# Patient Record
Sex: MALE | Race: ASIAN | NOT HISPANIC OR LATINO | Employment: UNEMPLOYED | URBAN - METROPOLITAN AREA
[De-identification: names, ages, dates, MRNs, and addresses within clinical notes are randomized per-mention and may not be internally consistent; named-entity substitution may affect disease eponyms.]

---

## 2017-10-10 ENCOUNTER — OFFICE VISIT (OUTPATIENT)
Dept: RETAIL CLINIC | Facility: CLINIC | Age: 27
End: 2017-10-10

## 2017-10-10 DIAGNOSIS — Z02.83 ENCOUNTER FOR DRUG SCREENING: Primary | ICD-10-CM

## 2017-10-10 NOTE — PROGRESS NOTES
Reason for Appointment  1. escreen    History of Present Illness  HPI:   See scanned document. See custody control form.    Assessments  1. Encounter for screening, unspecified - Z13.9    This document has been electronically signed by GINA Albert October 10, 2017 12:21 PM

## 2019-04-20 ENCOUNTER — OFFICE VISIT (OUTPATIENT)
Dept: FAMILY MEDICINE CLINIC | Facility: CLINIC | Age: 29
End: 2019-04-20

## 2019-04-20 VITALS
DIASTOLIC BLOOD PRESSURE: 86 MMHG | BODY MASS INDEX: 29.18 KG/M2 | RESPIRATION RATE: 18 BRPM | OXYGEN SATURATION: 96 % | WEIGHT: 197 LBS | HEART RATE: 102 BPM | SYSTOLIC BLOOD PRESSURE: 124 MMHG | HEIGHT: 69 IN

## 2019-04-20 DIAGNOSIS — F90.9 ATTENTION DEFICIT HYPERACTIVITY DISORDER (ADHD), UNSPECIFIED ADHD TYPE: Primary | ICD-10-CM

## 2019-04-20 PROCEDURE — 99203 OFFICE O/P NEW LOW 30 MIN: CPT | Performed by: FAMILY MEDICINE

## 2019-04-20 NOTE — PROGRESS NOTES
Subjective   Mayo Landers is a 28 y.o. male.     Chief Complaint   Patient presents with   • ADD       History of Present Illness     Previous primary care: None    Chronic health conditions:  None    Other physicians currently involved in patient's care:  None    Acute complaints:  Mayo Landers is a 28 y.o. male who presents today to establish care. 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 He states he has always had issues with concentration, but has never been diagnosed or medicated. He is trying to take his boards in a couple weeks.    The following portions of the patient's history were reviewed and updated as appropriate: allergies, current medications, past family history, past medical history, past social history, past surgical history and problem list.    Active Ambulatory Problems     Diagnosis Date Noted   • No Active Ambulatory Problems     Resolved Ambulatory Problems     Diagnosis Date Noted   • No Resolved Ambulatory Problems     Past Medical History:   Diagnosis Date   • Anxiety    • Chronic bronchitis (CMS/HCC)    • Depression    • Fatty liver    • Fracture      Past Surgical History:   Procedure Laterality Date   • EAR TUBES       Family History   Problem Relation Age of Onset   • Breast cancer Mother    • Hyperlipidemia Father    • Hypertension Father    • No Known Problems Sister    • Obesity Brother    • Klinefelter's syndrome Brother    • Breast cancer Maternal Grandmother      Social History     Socioeconomic History   • Marital status: Single     Spouse name: Not on file   • Number of children: Not on file   • Years of education: Not on file   • Highest education level: Not on file   Tobacco Use   • Smoking status: Current Every Day Smoker     Types: Electronic Cigarette   • Smokeless tobacco: Never Used   Substance and Sexual Activity   • Alcohol use: Yes     Comment:  "Ocassionally   • Drug use: No         Review of Systems   Constitutional: Negative.    Respiratory: Negative.    Cardiovascular: Negative.    Gastrointestinal: Negative.    Psychiatric/Behavioral: Positive for decreased concentration.       Objective   Blood pressure 124/86, pulse 102, resp. rate 18, height 175.3 cm (69\"), weight 89.4 kg (197 lb), SpO2 96 %.  Nursing note reviewed  Physical Exam  Const: NAD, A&Ox4, Pleasant, Cooperative  Eyes: EOMI, no conjunctivitis  ENT: No nasal discharge present, neck supple  Cardiac: Regular rate and rhythm, no cyanosis  Resp: Respiratory rate within normal limits, no increased work of breathing, no audible wheezing or retractions noted  GI: No distention or ascites  MSK: Motor and sensation grossly intact in bilateral upper extremities  Neurologic: CN II-XII grossly intact  Psych: Very fidgety, pressured speech  Skin: Pink, warm, dry  Procedures  Assessment/Plan   Mayo was seen today for add.    Diagnoses and all orders for this visit:    Attention deficit hyperactivity disorder (ADHD), unspecified ADHD type      Acute concerns:  #1 ADHD  Referral to neuropsych testing made  Follow up after this for medication    There are no Patient Instructions on file for this visit.    No Follow-up on file.    Ambulatory progress note signed and attested to by Francisco Javier Miller D.O. on 4/20/2019 at 11:33.           "

## 2019-04-22 ENCOUNTER — TELEPHONE (OUTPATIENT)
Dept: FAMILY MEDICINE CLINIC | Facility: CLINIC | Age: 29
End: 2019-04-22

## 2019-04-25 ENCOUNTER — OFFICE VISIT (OUTPATIENT)
Dept: FAMILY MEDICINE CLINIC | Facility: CLINIC | Age: 29
End: 2019-04-25

## 2019-04-25 VITALS
WEIGHT: 197.8 LBS | OXYGEN SATURATION: 97 % | BODY MASS INDEX: 29.3 KG/M2 | DIASTOLIC BLOOD PRESSURE: 72 MMHG | HEIGHT: 69 IN | HEART RATE: 70 BPM | SYSTOLIC BLOOD PRESSURE: 112 MMHG

## 2019-04-25 DIAGNOSIS — F90.1 ATTENTION DEFICIT HYPERACTIVITY DISORDER (ADHD), PREDOMINANTLY HYPERACTIVE TYPE: Primary | ICD-10-CM

## 2019-04-25 PROCEDURE — 99213 OFFICE O/P EST LOW 20 MIN: CPT | Performed by: FAMILY MEDICINE

## 2019-04-25 RX ORDER — DEXTROAMPHETAMINE SACCHARATE, AMPHETAMINE ASPARTATE, DEXTROAMPHETAMINE SULFATE AND AMPHETAMINE SULFATE 2.5; 2.5; 2.5; 2.5 MG/1; MG/1; MG/1; MG/1
10 TABLET ORAL DAILY
Qty: 60 TABLET | Refills: 0 | Status: SHIPPED | OUTPATIENT
Start: 2019-04-25 | End: 2019-05-28 | Stop reason: SDUPTHER

## 2019-04-26 ENCOUNTER — TELEPHONE (OUTPATIENT)
Dept: FAMILY MEDICINE CLINIC | Facility: CLINIC | Age: 29
End: 2019-04-26

## 2019-04-26 NOTE — TELEPHONE ENCOUNTER
PATIENT'S INSURANCE WILL NOT PAY FOR THE 60 TABS IN ONE RX. PATIENT WILL NEED THE OTHER 30 DAYS CALLED IN EITHER NOW OR IN MAY AROUND THE 25TH.

## 2019-05-01 NOTE — PROGRESS NOTES
Subjective   Mayo Landers is a 28 y.o. male.     Chief Complaint   Patient presents with   • ADD     Follow up, place that referral was made wouldn't accept his insurance, needs to know what to do now.       History of Present Illness     Mayo Landers presents today for follow-up.  He has a history of adult ADHD that has gone untreated.  We had attempted to get him into neuropsych testing, however he will be unable to get in for this for a few months.  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    The following portions of the patient's history were reviewed and updated as appropriate: allergies, current medications, past family history, past medical history, past social history, past surgical history and problem list.    Active Ambulatory Problems     Diagnosis Date Noted   • Attention deficit hyperactivity disorder (ADHD), predominantly hyperactive type 04/25/2019     Resolved Ambulatory Problems     Diagnosis Date Noted   • No Resolved Ambulatory Problems     Past Medical History:   Diagnosis Date   • Anxiety    • Chronic bronchitis (CMS/HCC)    • Depression    • Fatty liver    • Fracture      Past Surgical History:   Procedure Laterality Date   • EAR TUBES       Family History   Problem Relation Age of Onset   • Breast cancer Mother    • Hyperlipidemia Father    • Hypertension Father    • No Known Problems Sister    • Obesity Brother    • Klinefelter's syndrome Brother    • Breast cancer Maternal Grandmother      Social History     Socioeconomic History   • Marital status: Single     Spouse name: Not on file   • Number of children: Not on file   • Years of education: Not on file   • Highest education level: Not on file   Tobacco Use   • Smoking status: Current Every Day Smoker     Types: Electronic Cigarette   • Smokeless tobacco: Never Used   Substance and Sexual Activity   • Alcohol use: Yes     Comment: Ocassionally   • Drug use: No         Review of Systems   Constitutional: Negative.   "  Psychiatric/Behavioral: Positive for decreased concentration.       Objective   Blood pressure 112/72, pulse 70, height 175.3 cm (69\"), weight 89.7 kg (197 lb 12.8 oz), SpO2 97 %.  Nursing note reviewed  Physical Exam  Const: NAD, A&Ox4, restless and fidgety  Eyes: EOMI, no conjunctivitis  ENT: No nasal discharge present, neck supple  Cardiac: Regular rate and rhythm, no cyanosis  Resp: Respiratory rate within normal limits, no increased work of breathing, no audible wheezing or retractions noted  GI: No distention or ascites  MSK: Motor and sensation grossly intact in bilateral upper extremities  Neurologic: CN II-XII grossly intact  Psych: Appropriate mood and behavior.  Speech is rapid  Skin: Pink, warm, dry  Procedures  Assessment/Plan   Mayo was seen today for add.    Diagnoses and all orders for this visit:    Attention deficit hyperactivity disorder (ADHD), predominantly hyperactive type  -     amphetamine-dextroamphetamine (ADDERALL) 10 MG tablet; Take 1 tablet by mouth Daily.        #1  ADHD  Symptom score completed today by patient, basically threes across the board  UDS today  Short-term prescription of Adderall provided today while we await neuropsych testing  Myke reviewed and appropriate    There are no Patient Instructions on file for this visit.    Return in about 1 month (around 5/25/2019).    Ambulatory progress note signed and attested to by Francisco Javier Miller D.O.             "

## 2019-05-28 DIAGNOSIS — F90.1 ATTENTION DEFICIT HYPERACTIVITY DISORDER (ADHD), PREDOMINANTLY HYPERACTIVE TYPE: ICD-10-CM

## 2019-05-28 RX ORDER — DEXTROAMPHETAMINE SACCHARATE, AMPHETAMINE ASPARTATE, DEXTROAMPHETAMINE SULFATE AND AMPHETAMINE SULFATE 2.5; 2.5; 2.5; 2.5 MG/1; MG/1; MG/1; MG/1
10 TABLET ORAL DAILY
Qty: 30 TABLET | Refills: 0 | Status: SHIPPED | OUTPATIENT
Start: 2019-05-28 | End: 2019-07-02 | Stop reason: SDUPTHER

## 2019-06-10 ENCOUNTER — OFFICE VISIT (OUTPATIENT)
Dept: FAMILY MEDICINE CLINIC | Facility: CLINIC | Age: 29
End: 2019-06-10

## 2019-06-10 ENCOUNTER — LAB (OUTPATIENT)
Dept: LAB | Facility: HOSPITAL | Age: 29
End: 2019-06-10

## 2019-06-10 VITALS
HEIGHT: 69 IN | WEIGHT: 190 LBS | DIASTOLIC BLOOD PRESSURE: 60 MMHG | BODY MASS INDEX: 28.14 KG/M2 | HEART RATE: 80 BPM | TEMPERATURE: 98.1 F | SYSTOLIC BLOOD PRESSURE: 110 MMHG

## 2019-06-10 DIAGNOSIS — Z13.0 SCREENING FOR DEFICIENCY ANEMIA: ICD-10-CM

## 2019-06-10 DIAGNOSIS — F90.1 ATTENTION DEFICIT HYPERACTIVITY DISORDER (ADHD), PREDOMINANTLY HYPERACTIVE TYPE: Primary | ICD-10-CM

## 2019-06-10 DIAGNOSIS — Z13.29 SCREENING FOR THYROID DISORDER: ICD-10-CM

## 2019-06-10 DIAGNOSIS — Z13.220 SCREENING FOR LIPID DISORDERS: ICD-10-CM

## 2019-06-10 DIAGNOSIS — Z13.1 SCREENING FOR DIABETES MELLITUS: ICD-10-CM

## 2019-06-10 DIAGNOSIS — Z11.3 ROUTINE SCREENING FOR STI (SEXUALLY TRANSMITTED INFECTION): ICD-10-CM

## 2019-06-10 LAB
ALBUMIN SERPL-MCNC: 4.7 G/DL (ref 3.5–5.2)
ALBUMIN/GLOB SERPL: 1.3 G/DL
ALP SERPL-CCNC: 62 U/L (ref 39–117)
ALT SERPL W P-5'-P-CCNC: 37 U/L (ref 1–41)
ANION GAP SERPL CALCULATED.3IONS-SCNC: 14.8 MMOL/L
AST SERPL-CCNC: 44 U/L (ref 1–40)
BASOPHILS # BLD AUTO: 0.02 10*3/MM3 (ref 0–0.2)
BASOPHILS NFR BLD AUTO: 0.3 % (ref 0–1.5)
BILIRUB SERPL-MCNC: 0.6 MG/DL (ref 0.2–1.2)
BUN BLD-MCNC: 8 MG/DL (ref 6–20)
BUN/CREAT SERPL: 8.1 (ref 7–25)
CALCIUM SPEC-SCNC: 9.8 MG/DL (ref 8.6–10.5)
CHLORIDE SERPL-SCNC: 97 MMOL/L (ref 98–107)
CHOLEST SERPL-MCNC: 176 MG/DL (ref 0–200)
CO2 SERPL-SCNC: 26.2 MMOL/L (ref 22–29)
CREAT BLD-MCNC: 0.99 MG/DL (ref 0.76–1.27)
DEPRECATED RDW RBC AUTO: 40.2 FL (ref 37–54)
EOSINOPHIL # BLD AUTO: 0.09 10*3/MM3 (ref 0–0.4)
EOSINOPHIL NFR BLD AUTO: 1.5 % (ref 0.3–6.2)
ERYTHROCYTE [DISTWIDTH] IN BLOOD BY AUTOMATED COUNT: 13 % (ref 12.3–15.4)
GFR SERPL CREATININE-BSD FRML MDRD: 110 ML/MIN/1.73
GFR SERPL CREATININE-BSD FRML MDRD: 91 ML/MIN/1.73
GLOBULIN UR ELPH-MCNC: 3.5 GM/DL
GLUCOSE BLD-MCNC: 90 MG/DL (ref 65–99)
HCT VFR BLD AUTO: 47.6 % (ref 37.5–51)
HDLC SERPL-MCNC: 43 MG/DL (ref 40–60)
HGB BLD-MCNC: 15.8 G/DL (ref 13–17.7)
HIV1+2 AB SER QL: NORMAL
IMM GRANULOCYTES # BLD AUTO: 0.01 10*3/MM3 (ref 0–0.05)
IMM GRANULOCYTES NFR BLD AUTO: 0.2 % (ref 0–0.5)
LDLC SERPL CALC-MCNC: 115 MG/DL (ref 0–100)
LDLC/HDLC SERPL: 2.67 {RATIO}
LYMPHOCYTES # BLD AUTO: 2.27 10*3/MM3 (ref 0.7–3.1)
LYMPHOCYTES NFR BLD AUTO: 37.9 % (ref 19.6–45.3)
MCH RBC QN AUTO: 28.9 PG (ref 26.6–33)
MCHC RBC AUTO-ENTMCNC: 33.2 G/DL (ref 31.5–35.7)
MCV RBC AUTO: 87.2 FL (ref 79–97)
MONOCYTES # BLD AUTO: 0.49 10*3/MM3 (ref 0.1–0.9)
MONOCYTES NFR BLD AUTO: 8.2 % (ref 5–12)
NEUTROPHILS # BLD AUTO: 3.11 10*3/MM3 (ref 1.7–7)
NEUTROPHILS NFR BLD AUTO: 51.9 % (ref 42.7–76)
NRBC BLD AUTO-RTO: 0 /100 WBC (ref 0–0.2)
PLATELET # BLD AUTO: 373 10*3/MM3 (ref 140–450)
PMV BLD AUTO: 8.9 FL (ref 6–12)
POTASSIUM BLD-SCNC: 3.8 MMOL/L (ref 3.5–5.2)
PROT SERPL-MCNC: 8.2 G/DL (ref 6–8.5)
RBC # BLD AUTO: 5.46 10*6/MM3 (ref 4.14–5.8)
RPR SER QL: NORMAL
SODIUM BLD-SCNC: 138 MMOL/L (ref 136–145)
TRIGL SERPL-MCNC: 91 MG/DL (ref 0–150)
TSH SERPL DL<=0.05 MIU/L-ACNC: 1.31 MIU/ML (ref 0.27–4.2)
VLDLC SERPL-MCNC: 18.2 MG/DL (ref 5–40)
WBC NRBC COR # BLD: 5.99 10*3/MM3 (ref 3.4–10.8)

## 2019-06-10 PROCEDURE — 85025 COMPLETE CBC W/AUTO DIFF WBC: CPT | Performed by: NURSE PRACTITIONER

## 2019-06-10 PROCEDURE — G0432 EIA HIV-1/HIV-2 SCREEN: HCPCS | Performed by: NURSE PRACTITIONER

## 2019-06-10 PROCEDURE — 87591 N.GONORRHOEAE DNA AMP PROB: CPT | Performed by: NURSE PRACTITIONER

## 2019-06-10 PROCEDURE — 87491 CHLMYD TRACH DNA AMP PROBE: CPT | Performed by: NURSE PRACTITIONER

## 2019-06-10 PROCEDURE — 84443 ASSAY THYROID STIM HORMONE: CPT | Performed by: NURSE PRACTITIONER

## 2019-06-10 PROCEDURE — 80061 LIPID PANEL: CPT | Performed by: NURSE PRACTITIONER

## 2019-06-10 PROCEDURE — 80053 COMPREHEN METABOLIC PANEL: CPT | Performed by: NURSE PRACTITIONER

## 2019-06-10 PROCEDURE — 87661 TRICHOMONAS VAGINALIS AMPLIF: CPT | Performed by: NURSE PRACTITIONER

## 2019-06-10 PROCEDURE — 86592 SYPHILIS TEST NON-TREP QUAL: CPT | Performed by: NURSE PRACTITIONER

## 2019-06-10 PROCEDURE — 99213 OFFICE O/P EST LOW 20 MIN: CPT | Performed by: NURSE PRACTITIONER

## 2019-06-10 NOTE — PROGRESS NOTES
"Chief Complaint   Patient presents with   • Follow-up     He comes in today to be seen today to follow up onnew medication and std check        HPI   Mayo resents today for follow-up of Adderall for ADHD, he would also like lab work and STI testing.    ADHD  Chronic.  Improving.  States that he feels like he has been struggling with ADHD for quite some time, has never been on medication for this issue.  Is studying for [employer] at this time, recently graduated, feels that he needed something to help him focus.  He has been on Adderall for 2 months, feels that it is working very well for him.  He still gets distracted but it is easier for him to get back on track.  He does report that he has lost a little bit of weight \"which he needed to\".  He states that it makes him feel \"pepped up\" and focused.  Has had no side effects of this medication.  Would like to continue on medication for now and eventually only take it as needed.    Routine testing  Requesting routine lab work and STI testing.  He would like to be tested for HIV and syphilis as well, he is not sure if it is covered on insurance so he is going to call first.        Review of Systems   Constitutional: Positive for fatigue (student, bad sleep routine). Negative for activity change, unexpected weight gain and unexpected weight loss.   HENT: Negative for congestion.    Eyes: Negative for blurred vision and visual disturbance.   Respiratory: Negative for chest tightness, shortness of breath and wheezing.    Cardiovascular: Negative for chest pain, palpitations and leg swelling.   Gastrointestinal: Positive for abdominal pain (RUQ pain when he drinks hard liquor). Negative for constipation, diarrhea, nausea, vomiting and GERD.   Musculoskeletal: Negative for arthralgias.   Neurological: Negative for dizziness, light-headedness, headache and confusion.   Psychiatric/Behavioral: Positive for stress. Negative for depressed mood. The patient is nervous/anxious.     " "    Current Outpatient Medications on File Prior to Visit   Medication Sig Dispense Refill   • amphetamine-dextroamphetamine (ADDERALL) 10 MG tablet Take 1 tablet by mouth Daily. 30 tablet 0     No current facility-administered medications on file prior to visit.        No Known Allergies    Past Medical History:   Diagnosis Date   • Anxiety    • Chronic bronchitis (CMS/HCC)    • Depression    • Fatty liver    • Fracture        Social History     Tobacco Use   Smoking Status Current Every Day Smoker   • Types: Electronic Cigarette   Smokeless Tobacco Never Used        Visit Vitals  /60   Pulse 80   Temp 98.1 °F (36.7 °C)   Ht 175.3 cm (69\")   Wt 86.2 kg (190 lb)   BMI 28.06 kg/m²        Physical Exam   Constitutional: He is oriented to person, place, and time. Vital signs are normal. He appears well-developed and well-nourished.   HENT:   Head: Normocephalic.   Eyes: Lids are normal.   Neck: No thyromegaly present.   Cardiovascular: Normal rate, regular rhythm, normal heart sounds and intact distal pulses.   Pulmonary/Chest: Effort normal and breath sounds normal.   Abdominal: Soft. Bowel sounds are normal. There is no hepatosplenomegaly. There is no tenderness. There is no CVA tenderness, no tenderness at McBurney's point and negative Condon's sign.   Musculoskeletal: Normal range of motion.   Lymphadenopathy:        Head (right side): No submental, no submandibular, no tonsillar, no preauricular, no posterior auricular and no occipital adenopathy present.        Head (left side): No submental, no submandibular, no tonsillar, no preauricular, no posterior auricular and no occipital adenopathy present.     He has no cervical adenopathy.   Neurological: He is alert and oriented to person, place, and time. GCS eye subscore is 4. GCS verbal subscore is 5. GCS motor subscore is 6.   Skin: Skin is warm, dry and intact.   Psychiatric: He has a normal mood and affect. His speech is normal and behavior is normal. " Judgment and thought content normal.   Nursing note and vitals reviewed.      No results found for this or any previous visit.     Assessment/Plan  Mayo was seen today for follow-up.    Diagnoses and all orders for this visit:    ADHD   -Continue medication as directed.  Per the orders it appears that a refill will be due on 6/28.  I will notify Dr. Miller of the refill status and he is to follow-up with Dr. Miller for his next appointment.  -CSA, MAYURI, and UDS on file.    Routine screening for STI (sexually transmitted infection)  -     Chlamydia trachomatis, Neisseria gonorrhoeae, Trichomonas vaginalis, PCR - Urine, Urine, Clean Catch; Future  -     HIV-1 / O / 2 Ag / Antibody 4th Generation; Future  -     RPR; Future  -Educated on high risk sexual behavior and the importance of STI prevention with use of condoms.  Advised not to mix sex with alcohol.  Patient verbalized understanding.    Screening for deficiency anemia  -     CBC & Differential; Future  Screening for diabetes mellitus  -     Comprehensive Metabolic Panel; Future  Screening for lipid disorders  -     Lipid Panel; Future  Screening for thyroid disorder  -     TSH Rfx On Abnormal To Free T4; Future        Return in about 1 month (around 7/10/2019) for Follow-up.

## 2019-06-12 LAB
C TRACH RRNA SPEC DONR QL NAA+PROBE: NEGATIVE
GNRH SERPL-MCNC: NEGATIVE
T VAGINALIS RRNA GENITAL QL PROBE: NEGATIVE

## 2019-07-02 DIAGNOSIS — F90.1 ATTENTION DEFICIT HYPERACTIVITY DISORDER (ADHD), PREDOMINANTLY HYPERACTIVE TYPE: ICD-10-CM

## 2019-07-02 NOTE — TELEPHONE ENCOUNTER
Last fill: 5/28/19  Last office visit: 6/10/19  Next office visit: 7/11/19  Last Myke:   Controlled substance contract on file? 4/25/19  Last UDS:  4/25/19

## 2019-07-03 RX ORDER — DEXTROAMPHETAMINE SACCHARATE, AMPHETAMINE ASPARTATE, DEXTROAMPHETAMINE SULFATE AND AMPHETAMINE SULFATE 2.5; 2.5; 2.5; 2.5 MG/1; MG/1; MG/1; MG/1
10 TABLET ORAL DAILY
Qty: 30 TABLET | Refills: 0 | Status: SHIPPED | OUTPATIENT
Start: 2019-07-03 | End: 2019-08-14 | Stop reason: SDUPTHER

## 2019-07-11 ENCOUNTER — OFFICE VISIT (OUTPATIENT)
Dept: FAMILY MEDICINE CLINIC | Facility: CLINIC | Age: 29
End: 2019-07-11

## 2019-07-11 VITALS
HEIGHT: 69 IN | BODY MASS INDEX: 28.29 KG/M2 | SYSTOLIC BLOOD PRESSURE: 118 MMHG | DIASTOLIC BLOOD PRESSURE: 80 MMHG | WEIGHT: 191 LBS | HEART RATE: 73 BPM | OXYGEN SATURATION: 97 %

## 2019-07-11 DIAGNOSIS — F90.1 ATTENTION DEFICIT HYPERACTIVITY DISORDER (ADHD), PREDOMINANTLY HYPERACTIVE TYPE: Primary | ICD-10-CM

## 2019-07-11 PROCEDURE — 99213 OFFICE O/P EST LOW 20 MIN: CPT | Performed by: FAMILY MEDICINE

## 2019-07-11 RX ORDER — PHENOL 1.4 %
2 AEROSOL, SPRAY (ML) MUCOUS MEMBRANE NIGHTLY
COMMUNITY
End: 2021-03-25

## 2019-07-31 NOTE — PROGRESS NOTES
Subjective   Mayo Landers is a 29 y.o. male.     Chief Complaint   Patient presents with   • Follow-up       History of Present Illness     Mayo Landers presents today for follow-up.  He has a history of adult ADHD that has gone untreated.  We had attempted to get him into neuropsych testing, however he will be unable to get in for this for a few months.  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  He has been well maintained on Adderall instant release 10 mg once daily.  He occasionally does break it in half and take in morning and afternoon.    The following portions of the patient's history were reviewed and updated as appropriate: allergies, current medications, past family history, past medical history, past social history, past surgical history and problem list.    Active Ambulatory Problems     Diagnosis Date Noted   • Attention deficit hyperactivity disorder (ADHD), predominantly hyperactive type 04/25/2019     Resolved Ambulatory Problems     Diagnosis Date Noted   • No Resolved Ambulatory Problems     Past Medical History:   Diagnosis Date   • Anxiety    • Chronic bronchitis (CMS/HCC)    • Depression    • Fatty liver    • Fracture      Past Surgical History:   Procedure Laterality Date   • EAR TUBES       Family History   Problem Relation Age of Onset   • Breast cancer Mother    • Hyperlipidemia Father    • Hypertension Father    • No Known Problems Sister    • Obesity Brother    • Klinefelter's syndrome Brother    • Breast cancer Maternal Grandmother      Social History     Socioeconomic History   • Marital status: Single     Spouse name: Not on file   • Number of children: Not on file   • Years of education: Not on file   • Highest education level: Not on file   Tobacco Use   • Smoking status: Current Every Day Smoker     Types: Electronic Cigarette   • Smokeless tobacco: Never Used   Substance and Sexual Activity   • Alcohol use: Yes     Comment: Ocassionally   • Drug use: No  "        Review of Systems   Constitutional: Negative.    Psychiatric/Behavioral: Positive for decreased concentration.       Objective   Blood pressure 118/80, pulse 73, height 175.3 cm (69.02\"), weight 86.6 kg (191 lb), SpO2 97 %.  Nursing note reviewed  Physical Exam  Const: NAD, A&Ox4, much less restless and fidgety today  Eyes: EOMI, no conjunctivitis  ENT: No nasal discharge present, neck supple  Cardiac: Regular rate and rhythm, no cyanosis  Resp: Respiratory rate within normal limits, no increased work of breathing, no audible wheezing or retractions noted  GI: No distention or ascites  MSK: Motor and sensation grossly intact in bilateral upper extremities  Neurologic: CN II-XII grossly intact  Psych: Appropriate mood and behavior.  Speech is rapid  Skin: Pink, warm, dry  Procedures  Assessment/Plan   Mayo was seen today for follow-up.    Diagnoses and all orders for this visit:    Attention deficit hyperactivity disorder (ADHD), predominantly hyperactive type  Comments:  Continue Adderall IR 10mg daily        #1  ADHD  Myke reviewed and appropriate  He has some mild dyssomnia associated with the Adderall, we discussed taking it earlier in the day, using melatonin or Unisom tablets at night    Patient Instructions   1.  Use Unisom tablets at night for sleep.      Return in about 3 months (around 10/11/2019).    Ambulatory progress note signed and attested to by Francisco Javier Miller D.O.             "

## 2019-08-14 DIAGNOSIS — F90.1 ATTENTION DEFICIT HYPERACTIVITY DISORDER (ADHD), PREDOMINANTLY HYPERACTIVE TYPE: ICD-10-CM

## 2019-08-14 RX ORDER — DEXTROAMPHETAMINE SACCHARATE, AMPHETAMINE ASPARTATE, DEXTROAMPHETAMINE SULFATE AND AMPHETAMINE SULFATE 2.5; 2.5; 2.5; 2.5 MG/1; MG/1; MG/1; MG/1
10 TABLET ORAL DAILY
Qty: 30 TABLET | Refills: 0 | Status: SHIPPED | OUTPATIENT
Start: 2019-08-14 | End: 2019-09-16 | Stop reason: SDUPTHER

## 2019-08-14 NOTE — TELEPHONE ENCOUNTER
Last fill: 07/03/2019  Last office visit: 07/11/2019  Next office visit: 10/11/2019  Last Myke: Will print  Controlled substance contract on file? Yes  Last UDS: 04/25/2019-Consistent

## 2019-09-16 DIAGNOSIS — F90.1 ATTENTION DEFICIT HYPERACTIVITY DISORDER (ADHD), PREDOMINANTLY HYPERACTIVE TYPE: ICD-10-CM

## 2019-09-16 RX ORDER — DEXTROAMPHETAMINE SACCHARATE, AMPHETAMINE ASPARTATE, DEXTROAMPHETAMINE SULFATE AND AMPHETAMINE SULFATE 2.5; 2.5; 2.5; 2.5 MG/1; MG/1; MG/1; MG/1
10 TABLET ORAL DAILY
Qty: 30 TABLET | Refills: 0 | Status: SHIPPED | OUTPATIENT
Start: 2019-09-16 | End: 2019-10-14 | Stop reason: SDUPTHER

## 2019-09-16 NOTE — TELEPHONE ENCOUNTER
PATIENT CALLED FOR MED REFILL  amphetamine-dextroamphetamine (ADDERALL) 10 MG tablet   Medication   Date: 8/14/2019 Department: [facility] PRIMARY CARE Ordering/Authorizing: Francisco Javier Miller DO [facility] [address]    CALL BACK NUMBER [phone]

## 2019-09-16 NOTE — TELEPHONE ENCOUNTER
Last fill: 08/14/2019  Last office visit: 07/11/2019  Next office visit: 10/11/2019  Last Myke: Will print  Controlled substance contract on file? Yes   Last UDS: 04/25/2019-Consistent

## 2019-10-14 ENCOUNTER — OFFICE VISIT (OUTPATIENT)
Dept: FAMILY MEDICINE CLINIC | Facility: CLINIC | Age: 29
End: 2019-10-14

## 2019-10-14 VITALS
BODY MASS INDEX: 27.99 KG/M2 | DIASTOLIC BLOOD PRESSURE: 72 MMHG | WEIGHT: 189 LBS | HEIGHT: 69 IN | OXYGEN SATURATION: 99 % | HEART RATE: 70 BPM | SYSTOLIC BLOOD PRESSURE: 110 MMHG

## 2019-10-14 DIAGNOSIS — Z23 NEED FOR INFLUENZA VACCINATION: ICD-10-CM

## 2019-10-14 DIAGNOSIS — F90.1 ATTENTION DEFICIT HYPERACTIVITY DISORDER (ADHD), PREDOMINANTLY HYPERACTIVE TYPE: Primary | ICD-10-CM

## 2019-10-14 PROCEDURE — 99213 OFFICE O/P EST LOW 20 MIN: CPT | Performed by: FAMILY MEDICINE

## 2019-10-14 PROCEDURE — 90471 IMMUNIZATION ADMIN: CPT | Performed by: FAMILY MEDICINE

## 2019-10-14 PROCEDURE — 90674 CCIIV4 VAC NO PRSV 0.5 ML IM: CPT | Performed by: FAMILY MEDICINE

## 2019-10-14 RX ORDER — DEXTROAMPHETAMINE SACCHARATE, AMPHETAMINE ASPARTATE, DEXTROAMPHETAMINE SULFATE AND AMPHETAMINE SULFATE 2.5; 2.5; 2.5; 2.5 MG/1; MG/1; MG/1; MG/1
10 TABLET ORAL DAILY
Qty: 30 TABLET | Refills: 0 | Status: SHIPPED | OUTPATIENT
Start: 2019-10-14 | End: 2019-12-02 | Stop reason: SDUPTHER

## 2019-10-14 NOTE — PROGRESS NOTES
Subjective   Mayo Landers is a 29 y.o. male.     Chief Complaint   Patient presents with   • Follow-up       History of Present Illness     Mayo Landers presents today for follow-up.  He has a history of adult ADHD that has gone untreated.  We had attempted to get him into neuropsych testing, however he will be unable to get in for this for a few months.  He took his step 1 over the past couple months and did well, is now preparing for step 2.  He has been well maintained on Adderall instant release 10 mg once daily.  He occasionally does break it in half and take in morning and afternoon.  He takes melatonin intermittently for sleep.    The following portions of the patient's history were reviewed and updated as appropriate: allergies, current medications, past family history, past medical history, past social history, past surgical history and problem list.    Active Ambulatory Problems     Diagnosis Date Noted   • Attention deficit hyperactivity disorder (ADHD), predominantly hyperactive type 04/25/2019     Resolved Ambulatory Problems     Diagnosis Date Noted   • No Resolved Ambulatory Problems     Past Medical History:   Diagnosis Date   • Anxiety    • Chronic bronchitis (CMS/HCC)    • Depression    • Fatty liver    • Fracture      Past Surgical History:   Procedure Laterality Date   • EAR TUBES       Family History   Problem Relation Age of Onset   • Breast cancer Mother    • Hyperlipidemia Father    • Hypertension Father    • No Known Problems Sister    • Obesity Brother    • Klinefelter's syndrome Brother    • Breast cancer Maternal Grandmother      Social History     Socioeconomic History   • Marital status: Single     Spouse name: Not on file   • Number of children: Not on file   • Years of education: Not on file   • Highest education level: Not on file   Tobacco Use   • Smoking status: Current Every Day Smoker     Types: Electronic Cigarette   • Smokeless tobacco: Never Used   Substance and Sexual  "Activity   • Alcohol use: Yes     Comment: Ocassionally   • Drug use: No         Review of Systems   Constitutional: Negative.    Psychiatric/Behavioral: Positive for decreased concentration.       Objective   Blood pressure 110/72, pulse 70, height 175.3 cm (69.02\"), weight 85.7 kg (189 lb), SpO2 99 %.  Nursing note reviewed  Physical Exam  Const: NAD, A&Ox4, much less restless and fidgety today  Eyes: EOMI, no conjunctivitis  ENT: No nasal discharge present, neck supple  Cardiac: Regular rate and rhythm, no cyanosis  Resp: Respiratory rate within normal limits, no increased work of breathing, no audible wheezing or retractions noted  GI: No distention or ascites  MSK: Motor and sensation grossly intact in bilateral upper extremities  Neurologic: CN II-XII grossly intact  Psych: Appropriate mood and behavior.  Speech is rapid  Skin: Pink, warm, dry  Procedures  Assessment/Plan   Mayo was seen today for follow-up.    Diagnoses and all orders for this visit:    Attention deficit hyperactivity disorder (ADHD), predominantly hyperactive type  -     amphetamine-dextroamphetamine (ADDERALL) 10 MG tablet; Take 1 tablet by mouth Daily.    Need for influenza vaccination  -     Fluarix/Fluzone/Afluria Quad>6 Months        #1  ADHD  Myke reviewed and appropriate  Continue Adderall instant release 10 mg daily  He has some mild dyssomnia associated with the Adderall, we discussed taking it earlier in the day, using melatonin or Unisom tablets at night    There are no Patient Instructions on file for this visit.    No Follow-up on file.    Ambulatory progress note signed and attested to by Francisco Javier Miller D.O.             "

## 2019-10-28 ENCOUNTER — TELEPHONE (OUTPATIENT)
Dept: FAMILY MEDICINE CLINIC | Facility: CLINIC | Age: 29
End: 2019-10-28

## 2019-10-28 DIAGNOSIS — F90.1 ATTENTION DEFICIT HYPERACTIVITY DISORDER (ADHD), PREDOMINANTLY HYPERACTIVE TYPE: ICD-10-CM

## 2019-10-28 NOTE — TELEPHONE ENCOUNTER
Last fill:10/14/2019  Last office visit:10/14/2019  Next office visit:01/14/2020  Date of last Myke: none on file  CSA up-to-date? Yes   Date of last UDS:04/25/2019   UDS consistent: yes

## 2019-10-31 RX ORDER — DEXTROAMPHETAMINE SACCHARATE, AMPHETAMINE ASPARTATE, DEXTROAMPHETAMINE SULFATE AND AMPHETAMINE SULFATE 2.5; 2.5; 2.5; 2.5 MG/1; MG/1; MG/1; MG/1
10 TABLET ORAL DAILY
Qty: 30 TABLET | Refills: 0 | OUTPATIENT
Start: 2019-10-31

## 2019-11-27 ENCOUNTER — TELEPHONE (OUTPATIENT)
Dept: FAMILY MEDICINE CLINIC | Facility: CLINIC | Age: 29
End: 2019-11-27

## 2019-11-27 DIAGNOSIS — F90.1 ATTENTION DEFICIT HYPERACTIVITY DISORDER (ADHD), PREDOMINANTLY HYPERACTIVE TYPE: ICD-10-CM

## 2019-11-27 RX ORDER — DEXTROAMPHETAMINE SACCHARATE, AMPHETAMINE ASPARTATE, DEXTROAMPHETAMINE SULFATE AND AMPHETAMINE SULFATE 2.5; 2.5; 2.5; 2.5 MG/1; MG/1; MG/1; MG/1
10 TABLET ORAL DAILY
Qty: 30 TABLET | Refills: 0 | Status: CANCELLED | OUTPATIENT
Start: 2019-11-27

## 2019-12-02 ENCOUNTER — TELEPHONE (OUTPATIENT)
Dept: FAMILY MEDICINE CLINIC | Facility: CLINIC | Age: 29
End: 2019-12-02

## 2019-12-02 DIAGNOSIS — F90.1 ATTENTION DEFICIT HYPERACTIVITY DISORDER (ADHD), PREDOMINANTLY HYPERACTIVE TYPE: ICD-10-CM

## 2019-12-02 NOTE — TELEPHONE ENCOUNTER
PT CALLED IN TODAY CHECKING ON THE STATUS OF HIS RX FOR amphetamine-dextroamphetamine (ADDERALL) 10 MG tablet PT STATED HE IS COMPLETELY OUT OF RX    PT CB NUMBER:  [phone]   PH[facility]ACY:  [facility] [address] [address]  FAX# [phone]

## 2019-12-02 NOTE — TELEPHONE ENCOUNTER
Spoke with pt, informed him Dr. Miller was out of the office until tomorrow. He verbalized understanding and had no further questions.     Last fill: 10/14/2019  Last office visit: 10/14/2019  Next office visit: 1/14/2019  Date of last Myke: ran today   CSA up-to-date? Yes.   Date of last UDS: 4/25/2019  UDS consistent: Yes.

## 2019-12-03 RX ORDER — DEXTROAMPHETAMINE SACCHARATE, AMPHETAMINE ASPARTATE, DEXTROAMPHETAMINE SULFATE AND AMPHETAMINE SULFATE 2.5; 2.5; 2.5; 2.5 MG/1; MG/1; MG/1; MG/1
10 TABLET ORAL DAILY
Qty: 30 TABLET | Refills: 0 | Status: SHIPPED | OUTPATIENT
Start: 2019-12-03 | End: 2020-01-02 | Stop reason: SDUPTHER

## 2019-12-05 ENCOUNTER — OFFICE VISIT (OUTPATIENT)
Dept: FAMILY MEDICINE CLINIC | Facility: CLINIC | Age: 29
End: 2019-12-05

## 2019-12-05 VITALS
WEIGHT: 189 LBS | SYSTOLIC BLOOD PRESSURE: 120 MMHG | BODY MASS INDEX: 27.99 KG/M2 | HEART RATE: 86 BPM | HEIGHT: 69 IN | DIASTOLIC BLOOD PRESSURE: 84 MMHG | OXYGEN SATURATION: 98 %

## 2019-12-05 DIAGNOSIS — H60.391 INFECTION OF SKIN OF EAR LOBE, RIGHT: Primary | ICD-10-CM

## 2019-12-05 PROCEDURE — 99213 OFFICE O/P EST LOW 20 MIN: CPT | Performed by: NURSE PRACTITIONER

## 2019-12-05 NOTE — PROGRESS NOTES
Subjective   Mayo Landers is a 29 y.o. male.   Chief Complaint   Patient presents with   • Wound Infection     Painful this morning        History of Present Illness   Patient is here with complaint of possible right ear piercing site infection, has had piercing since Sept, no problems until this morning, woke up and was painful, removed earring, cleaned and put back in, is feeling better. The earrings he has in now are not the ones used for piercing. The enamel is flaking off. Unsure of type of metal.  The following portions of the patient's history were reviewed and updated as appropriate: allergies, current medications, past family history, past medical history, past social history, past surgical history and problem list.    Review of Systems   Constitutional: Negative for chills and fever.   Skin: Positive for wound.       Objective   Physical Exam   Constitutional: He is oriented to person, place, and time. He appears well-developed and well-nourished. No distress.   HENT:   Head: Normocephalic and atraumatic.   Eyes: No scleral icterus.   Cardiovascular: Normal rate, regular rhythm and normal heart sounds.   Pulmonary/Chest: Effort normal and breath sounds normal. No stridor. No respiratory distress.   Musculoskeletal: He exhibits no edema.   Lymphadenopathy:     He has no cervical adenopathy.   Neurological: He is alert and oriented to person, place, and time.   Skin: Skin is warm and dry. He is not diaphoretic. There is erythema (rt ear lobe).   Posterior ear lobe erythema and inflammation, no drainage noted   Psychiatric: He has a normal mood and affect. His behavior is normal. Thought content normal.   Vitals reviewed.      Assessment/Plan   Mayo was seen today for wound infection.    Diagnoses and all orders for this visit:    Infection of skin of ear lobe, right  -     mupirocin (BACTROBAN) 2 % ointment; Apply  topically to the appropriate area as directed 3 (Three) Times a Day for 10 days.      Advised  patient to go back to earrings used for piercing, clean them with alcohol first, clean ear lobes front and back, apply mupirocin or neosporin front and back before inserting earrings. Clean daily.  Monitor for worsening infections, follow up as needed.  Patient was encouraged to keep me informed of any acute changes, lack of improvement, or any new concerning symptoms.

## 2020-01-02 ENCOUNTER — TELEPHONE (OUTPATIENT)
Dept: FAMILY MEDICINE CLINIC | Facility: CLINIC | Age: 30
End: 2020-01-02

## 2020-01-02 DIAGNOSIS — F90.1 ATTENTION DEFICIT HYPERACTIVITY DISORDER (ADHD), PREDOMINANTLY HYPERACTIVE TYPE: ICD-10-CM

## 2020-01-02 NOTE — TELEPHONE ENCOUNTER
Last fill: 12/3/19  Last office visit: 12/5/19  Next office visit: 1/14/19  Date of last Myke: 12/5/19  CSA up-to-date? Yes  Date of last UDS: 4/25/19  UDS consistent: Yes

## 2020-01-03 RX ORDER — DEXTROAMPHETAMINE SACCHARATE, AMPHETAMINE ASPARTATE, DEXTROAMPHETAMINE SULFATE AND AMPHETAMINE SULFATE 2.5; 2.5; 2.5; 2.5 MG/1; MG/1; MG/1; MG/1
10 TABLET ORAL DAILY
Qty: 30 TABLET | Refills: 0 | Status: SHIPPED | OUTPATIENT
Start: 2020-01-03 | End: 2020-01-31 | Stop reason: SDUPTHER

## 2020-01-14 ENCOUNTER — OFFICE VISIT (OUTPATIENT)
Dept: FAMILY MEDICINE CLINIC | Facility: CLINIC | Age: 30
End: 2020-01-14

## 2020-01-14 VITALS
HEART RATE: 88 BPM | WEIGHT: 182 LBS | BODY MASS INDEX: 26.96 KG/M2 | HEIGHT: 69 IN | DIASTOLIC BLOOD PRESSURE: 80 MMHG | OXYGEN SATURATION: 98 % | SYSTOLIC BLOOD PRESSURE: 120 MMHG

## 2020-01-14 DIAGNOSIS — R07.1 CHEST PAIN ON BREATHING: ICD-10-CM

## 2020-01-14 DIAGNOSIS — F90.1 ATTENTION DEFICIT HYPERACTIVITY DISORDER (ADHD), PREDOMINANTLY HYPERACTIVE TYPE: Primary | ICD-10-CM

## 2020-01-14 PROCEDURE — 99214 OFFICE O/P EST MOD 30 MIN: CPT | Performed by: FAMILY MEDICINE

## 2020-01-14 NOTE — PROGRESS NOTES
Subjective   Mayo Landers is a 29 y.o. male.     Chief Complaint   Patient presents with   • Follow-up     ADHD       History of Present Illness     Mayo Landers presents today for   Chief Complaint   Patient presents with   • Follow-up     ADHD     Quit smoking about a year ago, used Juul regularly since that time. Vaped about a pod per day. Had done a refillable at one point long ago but none recently. He has had mild chest discomfort and heaviness over the past year and a half or so. Has not significantly changed. Happens on deep inspiration.    Otherwise his ADHD is well-controlled.    This patient is accompanied by their self who contributes to the history of their care.    The following portions of the patient's history were reviewed and updated as appropriate: allergies, current medications, past family history, past medical history, past social history, past surgical history and problem list.    Active Ambulatory Problems     Diagnosis Date Noted   • Attention deficit hyperactivity disorder (ADHD), predominantly hyperactive type 04/25/2019     Resolved Ambulatory Problems     Diagnosis Date Noted   • No Resolved Ambulatory Problems     Past Medical History:   Diagnosis Date   • Anxiety    • Chronic bronchitis (CMS/HCC)    • Depression    • Fatty liver    • Fracture      Past Surgical History:   Procedure Laterality Date   • EAR TUBES       Family History   Problem Relation Age of Onset   • Breast cancer Mother    • Hyperlipidemia Father    • Hypertension Father    • No Known Problems Sister    • Obesity Brother    • Klinefelter's syndrome Brother    • Breast cancer Maternal Grandmother      Social History     Socioeconomic History   • Marital status: Single     Spouse name: Not on file   • Number of children: Not on file   • Years of education: Not on file   • Highest education level: Not on file   Tobacco Use   • Smoking status: Current Every Day Smoker     Types: Electronic Cigarette   • Smokeless tobacco:  "Never Used   Substance and Sexual Activity   • Alcohol use: Yes     Comment: Ocassionally   • Drug use: No       Review of Systems  Review of Systems -  General ROS: negative for - chills, fever or night sweats  Cardiovascular ROS: no chest pain or dyspnea on exertion  Gastrointestinal ROS: no abdominal pain, change in bowel habits, or black or bloody stools  Genito-Urinary ROS: no dysuria, trouble voiding, or hematuria    Objective   Blood pressure 120/80, pulse 88, height 175.3 cm (69.02\"), weight 82.6 kg (182 lb), SpO2 98 %.  Nursing note reviewed  Physical Exam  Const: NAD, A&Ox4, Pleasant, Cooperative  Eyes: EOMI, no conjunctivitis  ENT: No nasal discharge present, neck supple  Cardiac: Regular rate and rhythm, no cyanosis  Resp: Respiratory rate within normal limits, no increased work of breathing, no audible wheezing or retractions noted  Procedures  Assessment/Plan   Problem List Items Addressed This Visit        Other    Attention deficit hyperactivity disorder (ADHD), predominantly hyperactive type - Primary      Other Visit Diagnoses     Chest pain on breathing        Relevant Orders    XR Chest 2 View        #1  ADHD  Myke reviewed and appropriate  Continue Adderall instant release 10 mg daily  He has some mild dyssomnia associated with the Adderall, we discussed taking it earlier in the day, using melatonin or Unisom tablets at night    #2 chest pain on breathing  This seems to have resolved for now, with his history of smoking and vaping, if his chest pain persists I did recommend a chest x-ray which has been ordered    See patient diagnoses and orders along with patient instructions for assessment, plan, and changes to care for patient.    There are no Patient Instructions on file for this visit.    Return in about 3 months (around 4/14/2020).    Ambulatory progress note signed and attested to by Francisco Javier Miller D.O.             "

## 2020-01-31 ENCOUNTER — TELEPHONE (OUTPATIENT)
Dept: FAMILY MEDICINE CLINIC | Facility: CLINIC | Age: 30
End: 2020-01-31

## 2020-01-31 DIAGNOSIS — F90.1 ATTENTION DEFICIT HYPERACTIVITY DISORDER (ADHD), PREDOMINANTLY HYPERACTIVE TYPE: ICD-10-CM

## 2020-01-31 RX ORDER — DEXTROAMPHETAMINE SACCHARATE, AMPHETAMINE ASPARTATE, DEXTROAMPHETAMINE SULFATE AND AMPHETAMINE SULFATE 2.5; 2.5; 2.5; 2.5 MG/1; MG/1; MG/1; MG/1
10 TABLET ORAL DAILY
Qty: 30 TABLET | Refills: 0 | Status: SHIPPED | OUTPATIENT
Start: 2020-01-31 | End: 2020-03-05 | Stop reason: SDUPTHER

## 2020-01-31 NOTE — TELEPHONE ENCOUNTER
Last fill:1/3/20  Last office visit:1/14/20  Next office visit:4/14/20  Date of last Myke:12/5/19  CSA up-to-date? 4/25/19  Date of last UDS: 4/25/19  UDS consistent:

## 2020-03-05 ENCOUNTER — TELEPHONE (OUTPATIENT)
Dept: FAMILY MEDICINE CLINIC | Facility: CLINIC | Age: 30
End: 2020-03-05

## 2020-03-05 DIAGNOSIS — F90.1 ATTENTION DEFICIT HYPERACTIVITY DISORDER (ADHD), PREDOMINANTLY HYPERACTIVE TYPE: ICD-10-CM

## 2020-03-05 RX ORDER — DEXTROAMPHETAMINE SACCHARATE, AMPHETAMINE ASPARTATE, DEXTROAMPHETAMINE SULFATE AND AMPHETAMINE SULFATE 2.5; 2.5; 2.5; 2.5 MG/1; MG/1; MG/1; MG/1
10 TABLET ORAL DAILY
Qty: 30 TABLET | Refills: 0 | Status: SHIPPED | OUTPATIENT
Start: 2020-03-05 | End: 2020-03-07 | Stop reason: SDUPTHER

## 2020-03-05 NOTE — TELEPHONE ENCOUNTER
Patient called and stated that refills are needed for the following prescription(s):    amphetamine-dextroamphetamine (ADDERALL) 10 MG tablet    Pharmacy: [facility] [address] [place]  PH: [phone]  FX: [phone]    Patient callback: [phone]    Please advise.

## 2020-03-05 NOTE — TELEPHONE ENCOUNTER
Last fill: 1/31/20  Last office visit: 1/14/20  Next office visit: 4/14/20  Last Myke:12/5/19  Controlled substance contract on file? yes  Last UDS: 4/25/19

## 2020-03-06 ENCOUNTER — TELEPHONE (OUTPATIENT)
Dept: FAMILY MEDICINE CLINIC | Facility: CLINIC | Age: 30
End: 2020-03-06

## 2020-03-06 NOTE — TELEPHONE ENCOUNTER
PATIENT CALLED IN REGARD TO HIS REFILL ON ADDERRALL. HE ADVISED THAT HIS PHARMACY HAS IT ON BACK ORDER, AND NONE OF THE OTHER [facility] PHARMACY'S HAVE THE MEDICATION. PT WOULD LIKE TO SEE WHAT HE CAN DO. HE IS CURRENTLY OUT OF THE MEDICATION.     PLEASE ADVISE PT [phone].

## 2020-03-07 ENCOUNTER — TELEPHONE (OUTPATIENT)
Dept: FAMILY MEDICINE CLINIC | Facility: CLINIC | Age: 30
End: 2020-03-07

## 2020-03-07 DIAGNOSIS — F90.1 ATTENTION DEFICIT HYPERACTIVITY DISORDER (ADHD), PREDOMINANTLY HYPERACTIVE TYPE: ICD-10-CM

## 2020-03-07 RX ORDER — DEXTROAMPHETAMINE SACCHARATE, AMPHETAMINE ASPARTATE, DEXTROAMPHETAMINE SULFATE AND AMPHETAMINE SULFATE 2.5; 2.5; 2.5; 2.5 MG/1; MG/1; MG/1; MG/1
10 TABLET ORAL DAILY
Qty: 30 TABLET | Refills: 0 | Status: SHIPPED | OUTPATIENT
Start: 2020-03-07 | End: 2020-04-01 | Stop reason: SDUPTHER

## 2020-03-07 NOTE — TELEPHONE ENCOUNTER
Pt would like his prescription for ADDERALL sent to a different pharmacy because it is on back order at his pharmacy.     He thinks that the [facility] at [facility] has it and would like it sent there.     He has been out for two days and does not want to wait for it to be at his normal pharmacy.

## 2020-03-07 NOTE — TELEPHONE ENCOUNTER
Duplicate message. Pt called today requesting [facility], according to this message no [facility] has it. Please advise.

## 2020-04-01 ENCOUNTER — TELEPHONE (OUTPATIENT)
Dept: FAMILY MEDICINE CLINIC | Facility: CLINIC | Age: 30
End: 2020-04-01

## 2020-04-01 DIAGNOSIS — F90.1 ATTENTION DEFICIT HYPERACTIVITY DISORDER (ADHD), PREDOMINANTLY HYPERACTIVE TYPE: ICD-10-CM

## 2020-04-01 NOTE — TELEPHONE ENCOUNTER
PT CALLED FOR A REFILL ON    amphetamine-dextroamphetamine (ADDERALL) 10 MG tablet    PT CALL BACK:  [phone]    PHARMACY  [place] [address] [address] - [phone]  - [phone]   [phone]

## 2020-04-01 NOTE — TELEPHONE ENCOUNTER
Last fill:3/7/20  Last office visit:1/14/20  Next office visit:4/14/20  Date of last Myke:today  CSA up-to-date? 4/25/19  Date of last UDS: 4/25/19  UDS consistent:

## 2020-04-02 RX ORDER — DEXTROAMPHETAMINE SACCHARATE, AMPHETAMINE ASPARTATE, DEXTROAMPHETAMINE SULFATE AND AMPHETAMINE SULFATE 2.5; 2.5; 2.5; 2.5 MG/1; MG/1; MG/1; MG/1
10 TABLET ORAL DAILY
Qty: 30 TABLET | Refills: 0 | Status: SHIPPED | OUTPATIENT
Start: 2020-04-02 | End: 2020-05-04 | Stop reason: SDUPTHER

## 2020-04-14 ENCOUNTER — TELEMEDICINE (OUTPATIENT)
Dept: FAMILY MEDICINE CLINIC | Facility: CLINIC | Age: 30
End: 2020-04-14

## 2020-04-14 DIAGNOSIS — F90.1 ATTENTION DEFICIT HYPERACTIVITY DISORDER (ADHD), PREDOMINANTLY HYPERACTIVE TYPE: Primary | ICD-10-CM

## 2020-04-14 PROCEDURE — 99213 OFFICE O/P EST LOW 20 MIN: CPT | Performed by: FAMILY MEDICINE

## 2020-04-14 NOTE — PATIENT INSTRUCTIONS
Attention Deficit Hyperactivity Disorder, Adult  Attention deficit hyperactivity disorder (ADHD) is a mental health disorder that starts during childhood. For many people with ADHD, the disorder continues into adult years. There are many things that you and your health care provider or therapist (mental health professional) can do to manage your symptoms.  What are the causes?  The exact cause of ADHD is not known.  What increases the risk?  You are more likely to develop this condition if:  · You have a family history of ADHD.  · You are male.  · You were born to a mother who smoked or drank alcohol during pregnancy.  · You were exposed to lead poisoning or other toxins in the womb or in early life.  · You were born before 37 weeks of pregnancy (prematurely) or you had a low birth weight.  · You have experienced a brain injury.  What are the signs or symptoms?  Symptoms of this condition depend on the type of ADHD. The two main types are inattentive and hyperactive-impulsive. Some people may have symptoms of both types.  Symptoms of the inattentive type include:  · Difficulty watching, listening, or thinking with focused effort (paying attention).  · Making careless mistakes.  · Not listening.  · Not following instructions.  · Being disorganized.  · Avoiding tasks that require time and attention.  · Losing things.  · Forgetting things.  · Being easily distracted.  Symptoms of the hyperactive-impulsive type include:  · Restlessness.  · Talking too much.  · Interrupting.  · Difficulty with:  ? Sitting still.  ? Staying quiet.  ? Feeling motivated.  ? Relaxing.  ? Waiting in line or waiting for a turn.  How is this diagnosed?  This condition is diagnosed based on your current symptoms and your history of symptoms. The diagnosis can be made by a provider such as a primary care provider, psychiatrist, psychologist, or clinical . The provider may use a symptom checklist or a standardized behavior rating  scale to evaluate your symptoms. He or she may want to talk with family members who have known you for a long time and have observed your behaviors.  There are no lab tests or brain imaging tests that can diagnose ADHD.  How is this treated?  This condition can be treated with medicines and behavior therapy. Medicines may be the best option to reduce impulsive behaviors and improve attention. Your health care provider may recommend:  · Stimulant medicines. These are the most common medicines used for adult ADHD. They affect certain chemicals in the brain (neurotransmitters). These medicines may be long-acting or short-acting. This will determine how often you need to take the medicine.  · A non-stimulant medicine for adult ADHD (atomoxetine). This medicine increases a neurotransmitter called norepinephrine. It may take weeks to months to see effects from this medicine.  Psychotherapy and behavioral management are also important for treating ADHD. Psychotherapy is often used along with medicine. Your health care provider may suggest:  · Cognitive behavioral therapy (CBT). This type of therapy teaches you to replace negative thoughts and actions with positive thoughts and actions. When used as part of ADHD treatment, this therapy may also include:  ? Coping strategies for organization, time management, impulse control, and stress reduction.  ? Mindfulness and meditation training.  · Behavioral management. This may include strategies for organization and time management. You may work with an ADHD  who is specially trained to help people with ADHD to manage and organize activities and to function more effectively.  Follow these instructions at home:  Medicines    · Take over-the-counter and prescription medicines only as told by your health care provider.  · Talk with your health care provider about the possible side effects of your medicine to watch for.  General instructions    · Learn as much as you can about  adult ADHD, and work closely with your health care providers to find the treatments that work best for you.  · Do not use drugs or abuse alcohol. Limit alcohol intake to no more than 1 drink a day for nonpregnant women and 2 drinks a day for men. One drink equals 12 oz of beer, 5 oz of wine, or 1½ oz of hard liquor.  · Follow the same schedule each day. Make sure your schedule includes enough time for you to get plenty of sleep.  · Use reminder devices like notes, calendars, and phone apps to stay on-time and organized.  · Eat a healthy diet. Do not skip meals.  · Exercise regularly. Exercise can help to reduce stress and anxiety.  · Keep all follow-up visits as told by your health care provider and therapist. This is important.  Where to find more information  · A health care provider may be able to recommend resources that are available online or over the phone. You could start with:  ? Attention Deficit Disorder Association (ADDA): www.add.org  ? National [place] of Mental Health (NIMH): www.nimh.nih.gov  Contact a health care provider if:  · Your symptoms are changing, getting worse, or not improving.  · You have side effects from your medicine, such as:  ? Repeated muscle twitches, coughing, or speech outbursts.  ? Sleep problems.  ? Loss of appetite.  ? Depression.  ? New or worsening behavior problems.  ? Dizziness.  ? Unusually fast heartbeat.  ? Stomach pains.  ? Headaches.  · You are struggling with anxiety, depression, or substance abuse.  Get help right away if:  · You have a severe reaction to a medicine.  · You have thoughts of hurting yourself or others.  If you ever feel like you may hurt yourself or others, or have thoughts about taking your own life, get help right away. You can go to the nearest emergency department or call:  · Your local emergency services (911 in the [place]  · A suicide crisis helpline, such as the National Suicide Prevention Lifeline at 1-513.698.5666. This is open 24 hours a  day.  Summary  · ADHD is a mental health disorder that starts during childhood and often continues into adult years.  · The exact cause of ADHD is not known.  · There is no cure for ADHD, but treatment with medicine, therapy, or behavioral training can help you manage your condition.  This information is not intended to replace advice given to you by your health care provider. Make sure you discuss any questions you have with your health care provider.  Document Released: 08/09/2018 Document Revised: 08/09/2018 Document Reviewed: 08/09/2018  [employer] Interactive Patient Education © 2020 [employer]

## 2020-04-14 NOTE — PROGRESS NOTES
Subjective   Mayo Landers is a 29 y.o. male.     Chief Complaint   Patient presents with   • ADHD       History of Present Illness     Mayo Landers presents today for   Chief Complaint   Patient presents with   • ADHD     His chest pain on inspiration has pretty much resolved entirely since he quit smoking and vaping.     His ADHD is well-controlled. He reports that his sleep has actully improved some on the Adderall.    This patient is accompanied by their self who contributes to the history of their care.    The following portions of the patient's history were reviewed and updated as appropriate: allergies, current medications, past family history, past medical history, past social history, past surgical history and problem list.    Active Ambulatory Problems     Diagnosis Date Noted   • Attention deficit hyperactivity disorder (ADHD), predominantly hyperactive type 04/25/2019     Resolved Ambulatory Problems     Diagnosis Date Noted   • No Resolved Ambulatory Problems     Past Medical History:   Diagnosis Date   • Anxiety    • Chronic bronchitis (CMS/HCC)    • Depression    • Fatty liver    • Fracture      Past Surgical History:   Procedure Laterality Date   • EAR TUBES       Family History   Problem Relation Age of Onset   • Breast cancer Mother    • Hyperlipidemia Father    • Hypertension Father    • No Known Problems Sister    • Obesity Brother    • Klinefelter's syndrome Brother    • Breast cancer Maternal Grandmother      Social History     Socioeconomic History   • Marital status: Single     Spouse name: Not on file   • Number of children: Not on file   • Years of education: Not on file   • Highest education level: Not on file   Tobacco Use   • Smoking status: Current Every Day Smoker     Types: Electronic Cigarette   • Smokeless tobacco: Never Used   Substance and Sexual Activity   • Alcohol use: Yes     Comment: Ocassionally   • Drug use: No       Review of Systems  Review of Systems -  General ROS:  negative for - chills, fever or night sweats  Cardiovascular ROS: no chest pain or dyspnea on exertion  Gastrointestinal ROS: no abdominal pain, change in bowel habits, or black or bloody stools  Genito-Urinary ROS: no dysuria, trouble voiding, or hematuria    Objective   There were no vitals taken for this visit.  Nursing note reviewed  Physical Exam  Const: NAD, A&Ox4, Pleasant, Cooperative  Eyes: EOMI, no conjunctivitis  ENT: No nasal discharge present, neck supple  Cardiac: Regular rate and rhythm, no cyanosis  Resp: Respiratory rate within normal limits, no increased work of breathing, no audible wheezing or retractions noted  Procedures  Assessment/Plan   Problem List Items Addressed This Visit        Other    Attention deficit hyperactivity disorder (ADHD), predominantly hyperactive type - Primary        #1  ADHD  Kaspers have been appropriate  UDS, CSA up to date  Continue Adderall instant release 10 mg daily, refill sent previously  He had some mild dyssomnia associated with the Adderall, we discussed taking it earlier in the day, using melatonin or Unisom tablets at night, this appears to have improved.    #2 chest pain on breathing  This seems to have resolved for now, with his history of smoking and vaping, if his chest pain persists I did recommend a chest x-ray which has been ordered previously    See patient diagnoses and orders along with patient instructions for assessment, plan, and changes to care for patient.    This visit was conducted via telemedicine with live audio and video provided through Zoom at the point of care.    Patient Instructions   Attention Deficit Hyperactivity Disorder, Adult  Attention deficit hyperactivity disorder (ADHD) is a mental health disorder that starts during childhood. For many people with ADHD, the disorder continues into adult years. There are many things that you and your health care provider or therapist (mental health professional) can do to manage your  symptoms.  What are the causes?  The exact cause of ADHD is not known.  What increases the risk?  You are more likely to develop this condition if:  · You have a family history of ADHD.  · You are male.  · You were born to a mother who smoked or drank alcohol during pregnancy.  · You were exposed to lead poisoning or other toxins in the womb or in early life.  · You were born before 37 weeks of pregnancy (prematurely) or you had a low birth weight.  · You have experienced a brain injury.  What are the signs or symptoms?  Symptoms of this condition depend on the type of ADHD. The two main types are inattentive and hyperactive-impulsive. Some people may have symptoms of both types.  Symptoms of the inattentive type include:  · Difficulty watching, listening, or thinking with focused effort (paying attention).  · Making careless mistakes.  · Not listening.  · Not following instructions.  · Being disorganized.  · Avoiding tasks that require time and attention.  · Losing things.  · Forgetting things.  · Being easily distracted.  Symptoms of the hyperactive-impulsive type include:  · Restlessness.  · Talking too much.  · Interrupting.  · Difficulty with:  ? Sitting still.  ? Staying quiet.  ? Feeling motivated.  ? Relaxing.  ? Waiting in line or waiting for a turn.  How is this diagnosed?  This condition is diagnosed based on your current symptoms and your history of symptoms. The diagnosis can be made by a provider such as a primary care provider, psychiatrist, psychologist, or clinical . The provider may use a symptom checklist or a standardized behavior rating scale to evaluate your symptoms. He or she may want to talk with family members who have known you for a long time and have observed your behaviors.  There are no lab tests or brain imaging tests that can diagnose ADHD.  How is this treated?  This condition can be treated with medicines and behavior therapy. Medicines may be the best option to reduce  impulsive behaviors and improve attention. Your health care provider may recommend:  · Stimulant medicines. These are the most common medicines used for adult ADHD. They affect certain chemicals in the brain (neurotransmitters). These medicines may be long-acting or short-acting. This will determine how often you need to take the medicine.  · A non-stimulant medicine for adult ADHD (atomoxetine). This medicine increases a neurotransmitter called norepinephrine. It may take weeks to months to see effects from this medicine.  Psychotherapy and behavioral management are also important for treating ADHD. Psychotherapy is often used along with medicine. Your health care provider may suggest:  · Cognitive behavioral therapy (CBT). This type of therapy teaches you to replace negative thoughts and actions with positive thoughts and actions. When used as part of ADHD treatment, this therapy may also include:  ? Coping strategies for organization, time management, impulse control, and stress reduction.  ? Mindfulness and meditation training.  · Behavioral management. This may include strategies for organization and time management. You may work with an ADHD  who is specially trained to help people with ADHD to manage and organize activities and to function more effectively.  Follow these instructions at home:  Medicines    · Take over-the-counter and prescription medicines only as told by your health care provider.  · Talk with your health care provider about the possible side effects of your medicine to watch for.  General instructions    · Learn as much as you can about adult ADHD, and work closely with your health care providers to find the treatments that work best for you.  · Do not use drugs or abuse alcohol. Limit alcohol intake to no more than 1 drink a day for nonpregnant women and 2 drinks a day for men. One drink equals 12 oz of beer, 5 oz of wine, or 1½ oz of hard liquor.  · Follow the same schedule each day.  Make sure your schedule includes enough time for you to get plenty of sleep.  · Use reminder devices like notes, calendars, and phone apps to stay on-time and organized.  · Eat a healthy diet. Do not skip meals.  · Exercise regularly. Exercise can help to reduce stress and anxiety.  · Keep all follow-up visits as told by your health care provider and therapist. This is important.  Where to find more information  · A health care provider may be able to recommend resources that are available online or over the phone. You could start with:  ? Attention Deficit Disorder Association (ADDA): www.add.org  ? National [place] of Mental Health (NIM): www.nimh.nih.gov  Contact a health care provider if:  · Your symptoms are changing, getting worse, or not improving.  · You have side effects from your medicine, such as:  ? Repeated muscle twitches, coughing, or speech outbursts.  ? Sleep problems.  ? Loss of appetite.  ? Depression.  ? New or worsening behavior problems.  ? Dizziness.  ? Unusually fast heartbeat.  ? Stomach pains.  ? Headaches.  · You are struggling with anxiety, depression, or substance abuse.  Get help right away if:  · You have a severe reaction to a medicine.  · You have thoughts of hurting yourself or others.  If you ever feel like you may hurt yourself or others, or have thoughts about taking your own life, get help right away. You can go to the nearest emergency department or call:  · Your local emergency services (911 in the [place]  · A suicide crisis helpline, such as the National Suicide Prevention Lifeline at 1-257.830.9098. This is open 24 hours a day.  Summary  · ADHD is a mental health disorder that starts during childhood and often continues into adult years.  · The exact cause of ADHD is not known.  · There is no cure for ADHD, but treatment with medicine, therapy, or behavioral training can help you manage your condition.  This information is not intended to replace advice given to you by  your health care provider. Make sure you discuss any questions you have with your health care provider.  Document Released: 08/09/2018 Document Revised: 08/09/2018 Document Reviewed: 08/09/2018  [employer] Interactive Patient Education © 2020 [employer]        Return in about 3 months (around 7/14/2020) for Controlled substance 3-month.    Ambulatory progress note signed and attested to by Francisco Javier Miller D.O.

## 2020-05-04 DIAGNOSIS — F90.1 ATTENTION DEFICIT HYPERACTIVITY DISORDER (ADHD), PREDOMINANTLY HYPERACTIVE TYPE: ICD-10-CM

## 2020-05-04 RX ORDER — DEXTROAMPHETAMINE SACCHARATE, AMPHETAMINE ASPARTATE, DEXTROAMPHETAMINE SULFATE AND AMPHETAMINE SULFATE 2.5; 2.5; 2.5; 2.5 MG/1; MG/1; MG/1; MG/1
10 TABLET ORAL DAILY
Qty: 30 TABLET | Refills: 0 | Status: SHIPPED | OUTPATIENT
Start: 2020-05-04 | End: 2020-06-03 | Stop reason: SDUPTHER

## 2020-05-04 NOTE — TELEPHONE ENCOUNTER
Last fill: 4/2/20  Last office visit: 4/14/20  Next office visit: 7/14/20  Date of last Myke: 12/5/19  CSA up-to-date? no  Date of last UDS: 4/25/29  UDS consistent: consistent

## 2020-05-04 NOTE — TELEPHONE ENCOUNTER
PT CALLED REQUESTING A REFILL FOR:  amphetamine-dextroamphetamine (Adderall) 10 MG tablet    [facility] [place] [address] - [place], [place] - [address] - [phone]  - [phone] FX

## 2020-05-13 ENCOUNTER — TELEPHONE (OUTPATIENT)
Dept: FAMILY MEDICINE CLINIC | Facility: CLINIC | Age: 30
End: 2020-05-13

## 2020-05-13 NOTE — TELEPHONE ENCOUNTER
PT CALLED ON 5/8  AND REQUESTED AN ORDER FOR BLOOD WORK AND PT HAS COME INTO CONTACT WITH SOMEONE WITH [facility] AND WOULD LIKED TO BE TESTED AS SOON AS POSSIBLE     PLEASE ADVISE PT [phone]

## 2020-05-14 NOTE — ADDENDUM NOTE
Addended by: ISAIAH WHITEHEAD on: 5/14/2020 09:09 AM     Modules accepted: Level of Service, SmartSet

## 2020-05-26 ENCOUNTER — LAB (OUTPATIENT)
Dept: LAB | Facility: HOSPITAL | Age: 30
End: 2020-05-26

## 2020-05-26 ENCOUNTER — OFFICE VISIT (OUTPATIENT)
Dept: FAMILY MEDICINE CLINIC | Facility: CLINIC | Age: 30
End: 2020-05-26

## 2020-05-26 VITALS
HEIGHT: 69 IN | OXYGEN SATURATION: 97 % | SYSTOLIC BLOOD PRESSURE: 128 MMHG | DIASTOLIC BLOOD PRESSURE: 82 MMHG | HEART RATE: 81 BPM | WEIGHT: 193 LBS | BODY MASS INDEX: 28.58 KG/M2

## 2020-05-26 DIAGNOSIS — R74.01 ELEVATED SGOT (AST): ICD-10-CM

## 2020-05-26 DIAGNOSIS — F90.1 ATTENTION DEFICIT HYPERACTIVITY DISORDER (ADHD), PREDOMINANTLY HYPERACTIVE TYPE: ICD-10-CM

## 2020-05-26 DIAGNOSIS — Z20.828 EXPOSURE TO EPSTEIN-BARR VIRUS: ICD-10-CM

## 2020-05-26 DIAGNOSIS — E78.5 MILD HYPERLIPIDEMIA: ICD-10-CM

## 2020-05-26 DIAGNOSIS — Z00.00 PREVENTATIVE HEALTH CARE: ICD-10-CM

## 2020-05-26 DIAGNOSIS — Z00.00 PREVENTATIVE HEALTH CARE: Primary | ICD-10-CM

## 2020-05-26 LAB
ALBUMIN SERPL-MCNC: 4.3 G/DL (ref 3.5–5.2)
ALBUMIN/GLOB SERPL: 1.5 G/DL
ALP SERPL-CCNC: 54 U/L (ref 39–117)
ALT SERPL W P-5'-P-CCNC: 24 U/L (ref 1–41)
ANION GAP SERPL CALCULATED.3IONS-SCNC: 9.7 MMOL/L (ref 5–15)
AST SERPL-CCNC: 16 U/L (ref 1–40)
BILIRUB SERPL-MCNC: 0.3 MG/DL (ref 0.2–1.2)
BILIRUB UR QL STRIP: NEGATIVE
BUN BLD-MCNC: 16 MG/DL (ref 6–20)
BUN/CREAT SERPL: 16.8 (ref 7–25)
CALCIUM SPEC-SCNC: 9.1 MG/DL (ref 8.6–10.5)
CHLORIDE SERPL-SCNC: 106 MMOL/L (ref 98–107)
CHOLEST SERPL-MCNC: 135 MG/DL (ref 0–200)
CLARITY UR: CLEAR
CO2 SERPL-SCNC: 24.3 MMOL/L (ref 22–29)
COLOR UR: YELLOW
CREAT BLD-MCNC: 0.95 MG/DL (ref 0.76–1.27)
GFR SERPL CREATININE-BSD FRML MDRD: 115 ML/MIN/1.73
GFR SERPL CREATININE-BSD FRML MDRD: 95 ML/MIN/1.73
GLOBULIN UR ELPH-MCNC: 2.8 GM/DL
GLUCOSE BLD-MCNC: 99 MG/DL (ref 65–99)
GLUCOSE UR STRIP-MCNC: NEGATIVE MG/DL
HDLC SERPL-MCNC: 44 MG/DL (ref 40–60)
HETEROPH AB SER QL LA: NEGATIVE
HGB UR QL STRIP.AUTO: NEGATIVE
HIV1+2 AB SER QL: NORMAL
KETONES UR QL STRIP: ABNORMAL
LDLC SERPL CALC-MCNC: 76 MG/DL (ref 0–100)
LDLC/HDLC SERPL: 1.74 {RATIO}
LEUKOCYTE ESTERASE UR QL STRIP.AUTO: NEGATIVE
NITRITE UR QL STRIP: NEGATIVE
PH UR STRIP.AUTO: 5.5 [PH] (ref 5–8)
POTASSIUM BLD-SCNC: 4.2 MMOL/L (ref 3.5–5.2)
PROT SERPL-MCNC: 7.1 G/DL (ref 6–8.5)
PROT UR QL STRIP: NEGATIVE
SODIUM BLD-SCNC: 140 MMOL/L (ref 136–145)
SP GR UR STRIP: >=1.03 (ref 1–1.03)
TRIGL SERPL-MCNC: 73 MG/DL (ref 0–150)
UROBILINOGEN UR QL STRIP: ABNORMAL
VLDLC SERPL-MCNC: 14.6 MG/DL (ref 5–40)

## 2020-05-26 PROCEDURE — 87491 CHLMYD TRACH DNA AMP PROBE: CPT

## 2020-05-26 PROCEDURE — G0432 EIA HIV-1/HIV-2 SCREEN: HCPCS

## 2020-05-26 PROCEDURE — 81003 URINALYSIS AUTO W/O SCOPE: CPT

## 2020-05-26 PROCEDURE — 80061 LIPID PANEL: CPT

## 2020-05-26 PROCEDURE — 80053 COMPREHEN METABOLIC PANEL: CPT

## 2020-05-26 PROCEDURE — 87661 TRICHOMONAS VAGINALIS AMPLIF: CPT

## 2020-05-26 PROCEDURE — 99395 PREV VISIT EST AGE 18-39: CPT | Performed by: FAMILY MEDICINE

## 2020-05-26 PROCEDURE — 87591 N.GONORRHOEAE DNA AMP PROB: CPT

## 2020-05-26 PROCEDURE — 86308 HETEROPHILE ANTIBODY SCREEN: CPT

## 2020-05-26 NOTE — PROGRESS NOTES
Subjective   Mayo Landers is a 29 y.o. male.     Chief Complaint   Patient presents with   • Labs Only     would like a test for mono, as well as std testing       History of Present Illness     Mayo Landers presents today for annual physical exam and preventative care.  He has ADHD which has been stable on Adderall 10 mg 1/2 to 1 tablet daily for the last year and a half or so.  He is still waiting to schedule his port exams, these have been put on hold due to the coronavirus.  He had some recent exposure to mono, a girl he was with tested positive about 3 weeks ago.  They had unprotected sexual intercourse about a month ago and he would also like to be tested for STDs.  He takes melatonin occasionally for sleep difficulty.  His exercise regimen has been somewhat limited over the last few months but prior to that was doing okay.  His diet is varied but high in calories.    This patient is accompanied by their self who contributes to the history of their care.    The following portions of the patient's history were reviewed and updated as appropriate: allergies, current medications, past family history, past medical history, past social history, past surgical history and problem list.    Active Ambulatory Problems     Diagnosis Date Noted   • Attention deficit hyperactivity disorder (ADHD), predominantly hyperactive type 04/25/2019     Resolved Ambulatory Problems     Diagnosis Date Noted   • No Resolved Ambulatory Problems     Past Medical History:   Diagnosis Date   • Anxiety    • Chronic bronchitis (CMS/HCC)    • Depression    • Fatty liver    • Fracture      Past Surgical History:   Procedure Laterality Date   • EAR TUBES       Family History   Problem Relation Age of Onset   • Breast cancer Mother    • Hyperlipidemia Father    • Hypertension Father    • No Known Problems Sister    • Obesity Brother    • Klinefelter's syndrome Brother    • Breast cancer Maternal Grandmother      Social History     Socioeconomic  "History   • Marital status: Single     Spouse name: Not on file   • Number of children: Not on file   • Years of education: Not on file   • Highest education level: Not on file   Tobacco Use   • Smoking status: Current Every Day Smoker     Types: Electronic Cigarette   • Smokeless tobacco: Never Used   Substance and Sexual Activity   • Alcohol use: Yes     Comment: Ocassionally   • Drug use: No       Review of Systems  Review of Systems -  General ROS: negative for - chills, fever or night sweats  Cardiovascular ROS: No dyspnea on exertion, he still has very rare sporadic chest discomfort which is self-limited  Gastrointestinal ROS: no abdominal pain, change in bowel habits, or black or bloody stools  Genito-Urinary ROS: no dysuria, trouble voiding, or hematuria    Objective   Blood pressure 128/82, pulse 81, height 175.3 cm (69.02\"), weight 87.5 kg (193 lb), SpO2 97 %.  Nursing note reviewed  Physical Exam  General: Patient is well-nourished, well-developed, and in no acute distress.  HEENT: Normocephalic with no contusions noted, no ptosis or palsy.  He is wearing a mask appropriately today.  Pupils equally round and reactive to light, extraocular movements intact. Patient holds steady gaze, can follow to midline. Hearing grossly normal without deficit, exterior auricles normal, tympanic membranes normal without erythema or bulging.  Lymphatic: Posterior auricular, cervical, submandibular, supraclavicular, axillary lymphatic sites palpated without abnormality  Cardiovascular: Normal study rate without ectopy. PMI palpated, normal. Normal S1, S2. No murmurs rubs or gallops.  Respiratory: No tenderness to palpation on the chest wall, lungs clear to auscultation bilaterally, no wheezes, rales, or rhonchi. No pleural friction rubs.  Extremities: No cyanosis or edema, 2+ pulses bilaterally, reflexes normal. Capillary refill time normal.  MSK: Normal gait and station. 5/5 strength globally.  Neuro: Cranial nerves II-XII " grossly intact. Patient alert and oriented x3.  PHQ-9 Depression Screening  Little interest or pleasure in doing things?     Feeling down, depressed, or hopeless?     Trouble falling or staying asleep, or sleeping too much?     Feeling tired or having little energy?     Poor appetite or overeating?     Feeling bad about yourself - or that you are a failure or have let yourself or your family down?     Trouble concentrating on things, such as reading the newspaper or watching television?     Moving or speaking so slowly that other people could have noticed? Or the opposite - being so fidgety or restless that you have been moving around a lot more than usual?     Thoughts that you would be better off dead, or of hurting yourself in some way?     PHQ-9 Total Score     If you checked off any problems, how difficult have these problems made it for you to do your work, take care of things at home, or get along with other people?       Procedures  Assessment/Plan   Problem List Items Addressed This Visit        Other    Attention deficit hyperactivity disorder (ADHD), predominantly hyperactive type      Other Visit Diagnoses     Preventative health care    -  Primary    Relevant Orders    Mononucleosis Screen    Chlamydia trachomatis, Neisseria gonorrhoeae, PCR - Urine, Urine, Clean Catch    Trichomonas vaginalis, PCR - Urine, Urine, Clean Catch    HIV-1 / O / 2 Ag / Antibody 4th Generation    Urinalysis With Microscopic If Indicated (No Culture) - Urine, Clean Catch    Exposure to Tammie-Barr virus        Relevant Orders    Mononucleosis Screen    Mild hyperlipidemia        Very mild LDL under 130, recheck yearly    Relevant Orders    Lipid Panel    Comprehensive Metabolic Panel    Elevated SGOT (AST)        Very mild AST 44 asymptomatic, recheck yearly    Relevant Orders    Comprehensive Metabolic Panel          See patient diagnoses and orders along with patient instructions for assessment, plan, and changes to care  "for patient.    The preventative exam has been reviewed in detail.  The patient has been fully counseled on preventative guidelines for vaccines, cancer screenings, and other health maintenance needs. The patient was counseled on maintaining a lifestyle to promote good health and to minimize chronic diseases.  The patient has been assisted with scheduling healthcare procedures for the coming year and given a written document outlining these recommendations. Age-appropriate screening measures have been ordered for the patient today as indicated above.    Patient Instructions   General Health Maintenance:  -Yearly dermatology exam  -Yearly eye exam if you are diabetic, otherwise every 2 years for patients without diabetes  -Dental exams/cleanings at least twice a year  -Staying current on your required colonoscopy, starts at age 50 unless there are other indications prior  -Prostate exam either manually in office or prostate specific antigen lab testing yearly starting at 50--this should be discussed before proceeding    Vaccines:  -Talk to pharmacist about shingrix shot  -Obtain flu shot when available  -Health department is recommending Hepatitis A vaccine    The largest impact you can have on your own health is getting the proper nutrition, exercise and maintaining an overall healthy body weight.  Proper nutrition involves eating lots of vegetables, fruit, minimal lean meat and avoiding processed foods and limiting refined sugars, carbohydrates and starches (\"the white stuff\").  Drinking at least 8 cups of water daily.  Walking at least 30 minutes a day and if possible, increasing this to a more cardiovascular aerobic exercise.  Maintaining a healthy body weight.      If you smoke or use tobacco products, quitting is extremely important and I am happy to discuss options with you regarding how to do this and what's available to assist you.    If you consume alcohol, limit your alcohol intake to 2 drinks a day for " men and 1 drink a day for women.    It is also important to make sure to keep regular appointments and have all general health maintenance items completed in a timely manner.      Thank you for entrusting me with your care.  It is a pleasure and honor to participate in your health.              No follow-ups on file.    Ambulatory progress note signed and attested to by Francisco Javier Miller D.O.           Current Outpatient Medications:   •  amphetamine-dextroamphetamine (Adderall) 10 MG tablet, Take 1 tablet by mouth Daily. Indications: Attention Deficit Hyperactivity Disorder, Disp: 30 tablet, Rfl: 0  •  Melatonin 10 MG tablet, Take 2 tablets by mouth Every Night., Disp: , Rfl:

## 2020-05-26 NOTE — PATIENT INSTRUCTIONS
"General Health Maintenance:  -Yearly dermatology exam  -Yearly eye exam if you are diabetic, otherwise every 2 years for patients without diabetes  -Dental exams/cleanings at least twice a year  -Staying current on your required colonoscopy, starts at age 50 unless there are other indications prior  -Prostate exam either manually in office or prostate specific antigen lab testing yearly starting at 50--this should be discussed before proceeding    Vaccines:  -Talk to pharmacist about shingrix shot  -Obtain flu shot when available  -Health department is recommending Hepatitis A vaccine    The largest impact you can have on your own health is getting the proper nutrition, exercise and maintaining an overall healthy body weight.  Proper nutrition involves eating lots of vegetables, fruit, minimal lean meat and avoiding processed foods and limiting refined sugars, carbohydrates and starches (\"the white stuff\").  Drinking at least 8 cups of water daily.  Walking at least 30 minutes a day and if possible, increasing this to a more cardiovascular aerobic exercise.  Maintaining a healthy body weight.      If you smoke or use tobacco products, quitting is extremely important and I am happy to discuss options with you regarding how to do this and what's available to assist you.    If you consume alcohol, limit your alcohol intake to 2 drinks a day for men and 1 drink a day for women.    It is also important to make sure to keep regular appointments and have all general health maintenance items completed in a timely manner.      Thank you for entrusting me with your care.  It is a pleasure and honor to participate in your health.          "

## 2020-05-27 LAB
C TRACH RRNA SPEC DONR QL NAA+PROBE: NEGATIVE
N GONORRHOEA DNA SPEC QL NAA+PROBE: NEGATIVE
SPECIMEN STATUS: NORMAL

## 2020-05-28 LAB — T VAGINALIS RRNA GENITAL QL PROBE: NEGATIVE

## 2020-06-03 DIAGNOSIS — F90.1 ATTENTION DEFICIT HYPERACTIVITY DISORDER (ADHD), PREDOMINANTLY HYPERACTIVE TYPE: ICD-10-CM

## 2020-06-03 NOTE — TELEPHONE ENCOUNTER
REFILL ON  amphetamine-dextroamphetamine (Adderall) 10 MG tablet    PHARMACY CONFIRMED      PLEASE ADVISE

## 2020-06-03 NOTE — TELEPHONE ENCOUNTER
"Last fill: 5/4/20  Last office visit: 5/26/20  Next office visit: 8/19/20  Last Myke: 12/5/19  Controlled substance contract on file? Will need updated   Last UDS: \"\"  "

## 2020-06-07 RX ORDER — DEXTROAMPHETAMINE SACCHARATE, AMPHETAMINE ASPARTATE, DEXTROAMPHETAMINE SULFATE AND AMPHETAMINE SULFATE 2.5; 2.5; 2.5; 2.5 MG/1; MG/1; MG/1; MG/1
10 TABLET ORAL DAILY
Qty: 30 TABLET | Refills: 0 | Status: SHIPPED | OUTPATIENT
Start: 2020-06-07 | End: 2020-06-30 | Stop reason: SDUPTHER

## 2020-06-30 DIAGNOSIS — F90.1 ATTENTION DEFICIT HYPERACTIVITY DISORDER (ADHD), PREDOMINANTLY HYPERACTIVE TYPE: ICD-10-CM

## 2020-06-30 RX ORDER — DEXTROAMPHETAMINE SACCHARATE, AMPHETAMINE ASPARTATE, DEXTROAMPHETAMINE SULFATE AND AMPHETAMINE SULFATE 2.5; 2.5; 2.5; 2.5 MG/1; MG/1; MG/1; MG/1
10 TABLET ORAL DAILY
Qty: 30 TABLET | Refills: 0 | Status: SHIPPED | OUTPATIENT
Start: 2020-06-30 | End: 2020-08-03

## 2020-06-30 NOTE — TELEPHONE ENCOUNTER
Last fill: 6/7/20  Last office visit: 5/26/20  Next office visit: 8/19/20  Date of last Myke: Today  CSA up-to-date? 4/25/19  Date of last UDS: 4/25/19  UDS consistent: consistent

## 2020-06-30 NOTE — TELEPHONE ENCOUNTER
Caller: Mayo Landers    Relationship: Self    Best call back number: [phone]    Medication needed:   Requested Prescriptions     Pending Prescriptions Disp Refills   • amphetamine-dextroamphetamine (Adderall) 10 MG tablet 30 tablet 0     Sig: Take 1 tablet by mouth Daily. Indications: Attention Deficit Hyperactivity Disorder       When do you need the refill by: IN 5 DAYS    What details did the patient provide when requesting the medication: N/A    Does the patient have less than a 3 day supply:  [] Yes  [x] No    What is the patient's preferred pharmacy:  [facility]/[address]/[place] [phone]

## 2020-07-13 ENCOUNTER — OFFICE VISIT (OUTPATIENT)
Dept: FAMILY MEDICINE CLINIC | Facility: CLINIC | Age: 30
End: 2020-07-13

## 2020-07-13 VITALS
BODY MASS INDEX: 29.03 KG/M2 | DIASTOLIC BLOOD PRESSURE: 80 MMHG | HEIGHT: 69 IN | HEART RATE: 94 BPM | WEIGHT: 196 LBS | OXYGEN SATURATION: 99 % | SYSTOLIC BLOOD PRESSURE: 120 MMHG

## 2020-07-13 DIAGNOSIS — S51.852A HUMAN BITE OF FOREARM, LEFT, INITIAL ENCOUNTER: ICD-10-CM

## 2020-07-13 DIAGNOSIS — F90.1 ATTENTION DEFICIT HYPERACTIVITY DISORDER (ADHD), PREDOMINANTLY HYPERACTIVE TYPE: Primary | ICD-10-CM
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
## 2020-07-13 DIAGNOSIS — F41.9 ANXIETY: ICD-10-CM

## 2020-07-13 PROCEDURE — 99214 OFFICE O/P EST MOD 30 MIN: CPT | Performed by: FAMILY MEDICINE
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
## 2020-07-13 NOTE — PATIENT INSTRUCTIONS
1.  Start extended release with next round    2.  Antibiotics for forearm wound    3.  If hand swelling/stiffness is not improved over the next week, call

## 2020-07-30 ENCOUNTER — TELEPHONE (OUTPATIENT)
Dept: FAMILY MEDICINE CLINIC | Facility: CLINIC | Age: 30
End: 2020-07-30
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
## 2020-07-30 DIAGNOSIS — S51.852A HUMAN BITE OF FOREARM, LEFT, INITIAL ENCOUNTER: Primary | ICD-10-CM

## 2020-07-30 RX ORDER — AMOXICILLIN AND CLAVULANATE POTASSIUM 875; 125 MG/1; MG/1
1 TABLET, FILM COATED ORAL 2 TIMES DAILY
Qty: 14 TABLET | Refills: 0 | Status: SHIPPED | OUTPATIENT
Start: 2020-07-30 | End: 2020-08-06

## 2020-07-30 NOTE — TELEPHONE ENCOUNTER
Patient is calling stating that his hand is still swollen and was suppose to have an antibiotic called into his pharmacy and they never received that. Patient stated that this hand is still pretty swollen can still move the tendons but it is sore. Please advise and call back    [phone]

## 2020-08-03 DIAGNOSIS — F90.1 ATTENTION DEFICIT HYPERACTIVITY DISORDER (ADHD), PREDOMINANTLY HYPERACTIVE TYPE: ICD-10-CM

## 2020-08-03 RX ORDER — DEXTROAMPHETAMINE SACCHARATE, AMPHETAMINE ASPARTATE, DEXTROAMPHETAMINE SULFATE AND AMPHETAMINE SULFATE 2.5; 2.5; 2.5; 2.5 MG/1; MG/1; MG/1; MG/1
10 TABLET ORAL DAILY
Qty: 30 TABLET | Refills: 0 | Status: CANCELLED | OUTPATIENT
Start: 2020-08-03

## 2020-08-03 RX ORDER — DEXTROAMPHETAMINE SACCHARATE, AMPHETAMINE ASPARTATE MONOHYDRATE, DEXTROAMPHETAMINE SULFATE AND AMPHETAMINE SULFATE 2.5; 2.5; 2.5; 2.5 MG/1; MG/1; MG/1; MG/1
10 CAPSULE, EXTENDED RELEASE ORAL EVERY MORNING
Qty: 30 CAPSULE | Refills: 0 | Status: SHIPPED | OUTPATIENT
Start: 2020-08-03 | End: 2020-08-28 | Stop reason: SDUPTHER

## 2020-08-03 NOTE — TELEPHONE ENCOUNTER
Caller: Mayo Landers    Relationship: Self    Best call back number: [phone]    Medication needed:   Requested Prescriptions     Pending Prescriptions Disp Refills   • amphetamine-dextroamphetamine (Adderall) 10 MG tablet 30 tablet 0     Sig: Take 1 tablet by mouth Daily. Indications: Attention Deficit Hyperactivity Disorder       When do you need the refill by: 08/06/2020    What details did the patient provide when requesting the medication: Patient has enough medication to last him until 08/07/2020. Patient states that the medication has been changed to an extended release.     Does the patient have less than a 3 day supply:  [] Yes  [x] No    What is the patient's preferred pharmacy: [address] [facility] [phone] [facility] [phone] FX

## 2020-08-03 NOTE — TELEPHONE ENCOUNTER
Patient called and stated that he was seen in office roughly 2 to 3 weeks ago for a swollen left hand. The patient states that the swelling has gone down some but theres still pain in the hand persistently but more so when he moves his hand. The patient states that there is more pain at night and in the middle of the day but in the morning and evening times the pain is manageable. The patient would like to know what the best plan of action would be for this issue. Please advise.     Patient call back [phone]

## 2020-08-19 ENCOUNTER — LAB (OUTPATIENT)
Dept: LAB | Facility: HOSPITAL | Age: 30
End: 2020-08-19

## 2020-08-19 ENCOUNTER — HOSPITAL ENCOUNTER (OUTPATIENT)
Dept: GENERAL RADIOLOGY | Facility: HOSPITAL | Age: 30
Discharge: HOME | End: 2020-08-19
Admitting: FAMILY MEDICINE

## 2020-08-19 ENCOUNTER — OFFICE VISIT (OUTPATIENT)
Dept: FAMILY MEDICINE CLINIC | Facility: CLINIC | Age: 30
End: 2020-08-19

## 2020-08-19 VITALS
OXYGEN SATURATION: 99 % | SYSTOLIC BLOOD PRESSURE: 122 MMHG | BODY MASS INDEX: 28.81 KG/M2 | HEART RATE: 96 BPM | DIASTOLIC BLOOD PRESSURE: 84 MMHG | HEIGHT: 69 IN | WEIGHT: 194.5 LBS

## 2020-08-19 DIAGNOSIS — Z00.00 PREVENTATIVE HEALTH CARE: ICD-10-CM

## 2020-08-19 DIAGNOSIS — Z13.29 SCREENING FOR THYROID DISORDER: ICD-10-CM

## 2020-08-19 DIAGNOSIS — S51.852A HUMAN BITE OF FOREARM, LEFT, INITIAL ENCOUNTER: ICD-10-CM
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
## 2020-08-19 DIAGNOSIS — F90.1 ATTENTION DEFICIT HYPERACTIVITY DISORDER (ADHD), PREDOMINANTLY HYPERACTIVE TYPE: Primary | ICD-10-CM

## 2020-08-19 DIAGNOSIS — Z11.3 ROUTINE SCREENING FOR STI (SEXUALLY TRANSMITTED INFECTION): ICD-10-CM

## 2020-08-19 DIAGNOSIS — R74.01 ELEVATED SGOT (AST): ICD-10-CM

## 2020-08-19 DIAGNOSIS — E78.5 MILD HYPERLIPIDEMIA: ICD-10-CM

## 2020-08-19 DIAGNOSIS — Z13.1 SCREENING FOR DIABETES MELLITUS: ICD-10-CM

## 2020-08-19 LAB
25(OH)D3 SERPL-MCNC: 12.8 NG/ML (ref 30–100)
ALBUMIN SERPL-MCNC: 4.8 G/DL (ref 3.5–5.2)
ALBUMIN/GLOB SERPL: 1.6 G/DL
ALP SERPL-CCNC: 56 U/L (ref 39–117)
ALT SERPL W P-5'-P-CCNC: 46 U/L (ref 1–41)
ANION GAP SERPL CALCULATED.3IONS-SCNC: 11.3 MMOL/L (ref 5–15)
AST SERPL-CCNC: 31 U/L (ref 1–40)
BASOPHILS # BLD AUTO: 0.02 10*3/MM3 (ref 0–0.2)
BASOPHILS NFR BLD AUTO: 0.4 % (ref 0–1.5)
BILIRUB SERPL-MCNC: 0.3 MG/DL (ref 0–1.2)
BILIRUB UR QL STRIP: NEGATIVE
BUN SERPL-MCNC: 11 MG/DL (ref 6–20)
BUN/CREAT SERPL: 11.5 (ref 7–25)
CALCIUM SPEC-SCNC: 10 MG/DL (ref 8.6–10.5)
CHLORIDE SERPL-SCNC: 102 MMOL/L (ref 98–107)
CHOLEST SERPL-MCNC: 194 MG/DL (ref 0–200)
CLARITY UR: CLEAR
CO2 SERPL-SCNC: 24.7 MMOL/L (ref 22–29)
COLOR UR: YELLOW
CREAT SERPL-MCNC: 0.96 MG/DL (ref 0.76–1.27)
CRP SERPL-MCNC: <0.02 MG/DL (ref 0.01–0.5)
DEPRECATED RDW RBC AUTO: 37.9 FL (ref 37–54)
EOSINOPHIL # BLD AUTO: 0.1 10*3/MM3 (ref 0–0.4)
EOSINOPHIL NFR BLD AUTO: 1.8 % (ref 0.3–6.2)
ERYTHROCYTE [DISTWIDTH] IN BLOOD BY AUTOMATED COUNT: 12.6 % (ref 12.3–15.4)
GFR SERPL CREATININE-BSD FRML MDRD: 113 ML/MIN/1.73
GFR SERPL CREATININE-BSD FRML MDRD: 93 ML/MIN/1.73
GLOBULIN UR ELPH-MCNC: 3 GM/DL
GLUCOSE SERPL-MCNC: 86 MG/DL (ref 65–99)
GLUCOSE UR STRIP-MCNC: NEGATIVE MG/DL
HBA1C MFR BLD: 5.3 % (ref 4.8–5.6)
HCT VFR BLD AUTO: 44.8 % (ref 37.5–51)
HDLC SERPL-MCNC: 43 MG/DL (ref 40–60)
HGB BLD-MCNC: 16.2 G/DL (ref 13–17.7)
HGB UR QL STRIP.AUTO: NEGATIVE
HIV1+2 AB SER QL: NORMAL
IMM GRANULOCYTES # BLD AUTO: 0.02 10*3/MM3 (ref 0–0.05)
IMM GRANULOCYTES NFR BLD AUTO: 0.4 % (ref 0–0.5)
KETONES UR QL STRIP: NEGATIVE
LDLC SERPL CALC-MCNC: 124 MG/DL (ref 0–100)
LDLC/HDLC SERPL: 2.89 {RATIO}
LEUKOCYTE ESTERASE UR QL STRIP.AUTO: NEGATIVE
LYMPHOCYTES # BLD AUTO: 1.98 10*3/MM3 (ref 0.7–3.1)
LYMPHOCYTES NFR BLD AUTO: 35.3 % (ref 19.6–45.3)
MCH RBC QN AUTO: 30.3 PG (ref 26.6–33)
MCHC RBC AUTO-ENTMCNC: 36.2 G/DL (ref 31.5–35.7)
MCV RBC AUTO: 83.9 FL (ref 79–97)
MONOCYTES # BLD AUTO: 0.44 10*3/MM3 (ref 0.1–0.9)
MONOCYTES NFR BLD AUTO: 7.8 % (ref 5–12)
NEUTROPHILS NFR BLD AUTO: 3.05 10*3/MM3 (ref 1.7–7)
NEUTROPHILS NFR BLD AUTO: 54.3 % (ref 42.7–76)
NITRITE UR QL STRIP: NEGATIVE
NRBC BLD AUTO-RTO: 0 /100 WBC (ref 0–0.2)
PH UR STRIP.AUTO: 6 [PH] (ref 5–8)
PLATELET # BLD AUTO: 371 10*3/MM3 (ref 140–450)
PMV BLD AUTO: 8.5 FL (ref 6–12)
POTASSIUM SERPL-SCNC: 4.6 MMOL/L (ref 3.5–5.2)
PROT SERPL-MCNC: 7.8 G/DL (ref 6–8.5)
PROT UR QL STRIP: NEGATIVE
RBC # BLD AUTO: 5.34 10*6/MM3 (ref 4.14–5.8)
SODIUM SERPL-SCNC: 138 MMOL/L (ref 136–145)
SP GR UR STRIP: 1.02 (ref 1–1.03)
TRIGL SERPL-MCNC: 133 MG/DL (ref 0–150)
TSH SERPL DL<=0.05 MIU/L-ACNC: 1.76 UIU/ML (ref 0.27–4.2)
UROBILINOGEN UR QL STRIP: NORMAL
VIT B12 BLD-MCNC: 286 PG/ML (ref 211–946)
VLDLC SERPL-MCNC: 26.6 MG/DL (ref 5–40)
WBC # BLD AUTO: 5.61 10*3/MM3 (ref 3.4–10.8)

## 2020-08-19 PROCEDURE — 87491 CHLMYD TRACH DNA AMP PROBE: CPT

## 2020-08-19 PROCEDURE — 82306 VITAMIN D 25 HYDROXY: CPT

## 2020-08-19 PROCEDURE — 82607 VITAMIN B-12: CPT

## 2020-08-19 PROCEDURE — 87591 N.GONORRHOEAE DNA AMP PROB: CPT

## 2020-08-19 PROCEDURE — G0432 EIA HIV-1/HIV-2 SCREEN: HCPCS

## 2020-08-19 PROCEDURE — 73130 X-RAY EXAM OF HAND: CPT

## 2020-08-19 PROCEDURE — 84443 ASSAY THYROID STIM HORMONE: CPT

## 2020-08-19 PROCEDURE — 87661 TRICHOMONAS VAGINALIS AMPLIF: CPT

## 2020-08-19 PROCEDURE — 83036 HEMOGLOBIN GLYCOSYLATED A1C: CPT

## 2020-08-19 PROCEDURE — 80053 COMPREHEN METABOLIC PANEL: CPT

## 2020-08-19 PROCEDURE — 86141 C-REACTIVE PROTEIN HS: CPT

## 2020-08-19 PROCEDURE — 81003 URINALYSIS AUTO W/O SCOPE: CPT

## 2020-08-19 PROCEDURE — 99214 OFFICE O/P EST MOD 30 MIN: CPT | Performed by: FAMILY MEDICINE

## 2020-08-19 PROCEDURE — 85025 COMPLETE CBC W/AUTO DIFF WBC: CPT

## 2020-08-19 PROCEDURE — 80061 LIPID PANEL: CPT

## 2020-08-19 NOTE — PATIENT INSTRUCTIONS
1.  Labs and xray today    2.  If signs of inflammation or persistent swelling, we will get further imaging to assess for bone infection
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
## 2020-08-21 LAB
C TRACH RRNA SPEC DONR QL NAA+PROBE: NEGATIVE
N GONORRHOEA DNA SPEC QL NAA+PROBE: NEGATIVE
T VAGINALIS RRNA GENITAL QL PROBE: NEGATIVE

## 2020-08-28 DIAGNOSIS — F90.1 ATTENTION DEFICIT HYPERACTIVITY DISORDER (ADHD), PREDOMINANTLY HYPERACTIVE TYPE: ICD-10-CM

## 2020-08-31 ENCOUNTER — TELEPHONE (OUTPATIENT)
Dept: FAMILY MEDICINE CLINIC | Facility: CLINIC | Age: 30
End: 2020-08-31

## 2020-09-01 ENCOUNTER — TELEMEDICINE (OUTPATIENT)
Dept: FAMILY MEDICINE CLINIC | Facility: CLINIC | Age: 30
End: 2020-09-01

## 2020-09-01 DIAGNOSIS — S51.852A HUMAN BITE OF FOREARM, LEFT, INITIAL ENCOUNTER: Primary | ICD-10-CM

## 2020-09-01 DIAGNOSIS — M79.642 PAIN OF LEFT HAND: ICD-10-CM
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
## 2020-09-01 PROCEDURE — 99213 OFFICE O/P EST LOW 20 MIN: CPT | Performed by: FAMILY MEDICINE

## 2020-09-01 RX ORDER — DEXTROAMPHETAMINE SACCHARATE, AMPHETAMINE ASPARTATE MONOHYDRATE, DEXTROAMPHETAMINE SULFATE AND AMPHETAMINE SULFATE 2.5; 2.5; 2.5; 2.5 MG/1; MG/1; MG/1; MG/1
10 CAPSULE, EXTENDED RELEASE ORAL EVERY MORNING
Qty: 30 CAPSULE | Refills: 0 | Status: SHIPPED | OUTPATIENT
Start: 2020-09-01 | End: 2020-09-29 | Stop reason: SDUPTHER
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
## 2020-09-29 DIAGNOSIS — F90.1 ATTENTION DEFICIT HYPERACTIVITY DISORDER (ADHD), PREDOMINANTLY HYPERACTIVE TYPE: ICD-10-CM

## 2020-09-29 NOTE — TELEPHONE ENCOUNTER
Caller: Mayo Landers    Relationship: Self    Best call back number: [phone]    Medication needed:   Requested Prescriptions     Pending Prescriptions Disp Refills   • amphetamine-dextroamphetamine XR (Adderall XR) 10 MG 24 hr capsule 30 capsule 0     Sig: Take 1 capsule by mouth Every Morning       When do you need the refill by: 10/01/20    What details did the patient provide when requesting the medication:     Does the patient have less than a 3 day supply:  [x] Yes  [x] No    What is the patient's preferred pharmacy:     [facility] [address] [place] [phone] [facility] [phone] FX

## 2020-09-29 NOTE — TELEPHONE ENCOUNTER
Last fill: 9/1/20  Last office visit: 8/19/20  Next office visit: 11/19/20  CSA up-to-date? 8/19/20  Date of last UDS: 8/19/20  UDS consistent: Consistent

## 2020-09-30 RX ORDER — DEXTROAMPHETAMINE SACCHARATE, AMPHETAMINE ASPARTATE MONOHYDRATE, DEXTROAMPHETAMINE SULFATE AND AMPHETAMINE SULFATE 2.5; 2.5; 2.5; 2.5 MG/1; MG/1; MG/1; MG/1
10 CAPSULE, EXTENDED RELEASE ORAL EVERY MORNING
Qty: 30 CAPSULE | Refills: 0 | Status: SHIPPED | OUTPATIENT
Start: 2020-09-30 | End: 2020-10-29 | Stop reason: SDUPTHER

## 2020-10-14 ENCOUNTER — OFFICE VISIT (OUTPATIENT)
Dept: FAMILY MEDICINE CLINIC | Facility: CLINIC | Age: 30
End: 2020-10-14

## 2020-10-14 VITALS
BODY MASS INDEX: 28.79 KG/M2 | HEART RATE: 106 BPM | WEIGHT: 194.4 LBS | HEIGHT: 69 IN | SYSTOLIC BLOOD PRESSURE: 140 MMHG | OXYGEN SATURATION: 98 % | DIASTOLIC BLOOD PRESSURE: 92 MMHG

## 2020-10-14 DIAGNOSIS — N48.89 PENILE PAPULES: Primary | ICD-10-CM

## 2020-10-14 PROCEDURE — 99214 OFFICE O/P EST MOD 30 MIN: CPT | Performed by: FAMILY MEDICINE

## 2020-10-14 NOTE — PROGRESS NOTES
Subjective   Mayo Landers is a 30 y.o. male.     Chief Complaint   Patient presents with   • Mass     patient reports that a few days ago he found some bumps on the head of his penis, states that he has been monogamous and does not have history of STD. Patient reports that they are not painful, no itching. Had a new one pop up today.       History of Present Illness     Mayo Landers presents today for   Chief Complaint   Patient presents with   • Mass     patient reports that a few days ago he found some bumps on the head of his penis, states that he has been monogamous and does not have history of STD. Patient reports that they are not painful, no itching. Had a new one pop up today.     New papular lesion on the glans penis.  No new sexual interactions, has had the same sexual partner for 2 years.  No history of STD, is tested frequently.  Is unaware of any history of HPV in himself or his partner.  Denies any pain, discharge, or any other symptoms at all.  It seems to be worsening.  Although it is not pruritic, he did scratch at 1 and it opened up a little bit.  No bleeding    This patient is accompanied by their self who contributes to the history of their care.    The following portions of the patient's history were reviewed and updated as appropriate: allergies, current medications, past family history, past medical history, past social history, past surgical history and problem list.    Active Ambulatory Problems     Diagnosis Date Noted   • Attention deficit hyperactivity disorder (ADHD), predominantly hyperactive type 04/25/2019     Resolved Ambulatory Problems     Diagnosis Date Noted   • No Resolved Ambulatory Problems     Past Medical History:   Diagnosis Date   • Anxiety    • Chronic bronchitis (CMS/HCC)    • Depression    • Fatty liver    • Fracture      Past Surgical History:   Procedure Laterality Date   • EAR TUBES       Family History   Problem Relation Age of Onset   • Breast cancer Mother   "  • Hyperlipidemia Father    • Hypertension Father    • No Known Problems Sister    • Obesity Brother    • Klinefelter's syndrome Brother    • Breast cancer Maternal Grandmother      Social History     Socioeconomic History   • Marital status: Single     Spouse name: Not on file   • Number of children: Not on file   • Years of education: Not on file   • Highest education level: Not on file   Tobacco Use   • Smoking status: Current Every Day Smoker     Types: Electronic Cigarette   • Smokeless tobacco: Never Used   Substance and Sexual Activity   • Alcohol use: Yes     Comment: Ocassionally   • Drug use: No       Review of Systems  Review of Systems -  General ROS: negative for - chills, fever or night sweats  Cardiovascular ROS: no chest pain or dyspnea on exertion  Gastrointestinal ROS: no abdominal pain, change in bowel habits, or black or bloody stools  Genito-Urinary ROS: no dysuria, trouble voiding, or hematuria    Objective   Blood pressure 140/92, pulse 106, height 175.3 cm (69\"), weight 88.2 kg (194 lb 6.4 oz), SpO2 98 %.  Nursing note reviewed  Physical Exam  Const: NAD, A&Ox4, Pleasant, Cooperative  Eyes: EOMI, no conjunctivitis  ENT: No nasal discharge present, neck supple  Cardiac: Regular rate and rhythm, no cyanosis  Resp: Respiratory rate within normal limits, no increased work of breathing, no audible wheezing or retractions noted  GI: No distention or ascites  MSK: Motor and sensation grossly intact in bilateral upper extremities  Neurologic: CN II-XII grossly intact  Psych: Appropriate mood and behavior.  Skin: 3 isolated papular lesions on the glans penis, 2 on dorsum one on ventral aspect.  Largest is 2 mm in size there is a central excoriation versus ulceration  Procedures  Assessment/Plan   Problem List Items Addressed This Visit     None      Visit Diagnoses     Penile papules    -  Primary    Relevant Orders    Ambulatory Referral to Dermatology    RPR        Unclear etiology, depending on " the evolution of the lesions differential includes HPV, syphilis (unlikely), or molluscum contagiosum.  Recommended RPR testing, referral to dermatology if patient desires biopsy, otherwise observe, avoid sexual activity while lesions are present.    See patient diagnoses and orders along with patient instructions for assessment, plan, and changes to care for patient.    Patient Instructions   Unclear etiology, depending on the evolution of the lesions differential includes HPV, syphilis (unlikely), or molluscum contagiosum.    Human Papillomavirus  Human papillomavirus (HPV) is the most common sexually transmitted infection (STI). It spreads easily from person to person (ishighly contagious). There are many types of HPV. It usually does not cause symptoms. However, sometimes it may cause wart-like lesions in the throat or warts in the genital area. It is possible to be infected for a long time and pass HPV to others without knowing it.  Certain types of HPV may cause cancers, including cancer of the lower part of the uterus (cervix), vagina, outer female genital area (vulva), penis, anus, and rectum. HPV may also cause cancers of the oral cavity, such as the throat, tongue, and tonsils.  What are the causes?  HPV is caused by a virus that spreads from person to person through oral, vaginal, or anal sex.  What increases the risk?  You may be more likely to develop this condition if you have or have had:  · Unprotected oral, vaginal, or anal sex.  · Several sex partners.  · A sex partner who has other sex partners.  · Another STI.  · A weak disease-fighting system (immune system).  · Damaged skin in the genital, oral, or anal area.  What are the signs or symptoms?  Most people who have HPV do not have any symptoms. If symptoms are present, they may include:  · Wart-like lesions in the throat (from having oral sex).  · Warts on the infected skin or mucous membranes.  · Genital warts that may itch, burn, bleed, or be  painful during sex.  How is this diagnosed?  If you have wart-like lumps in the anal area or throat, or if genital warts are present, your health care provider can usually diagnose HPV with a physical exam. Genital warts are easily seen.  In females, tests may be used to diagnose HPV, including:  · A Pap test. A Pap test takes a sample of cells from the cervix to check for cancer and HPV infection.  · An HPV test. This is similar to a Pap test and involves taking a sample of cells from the cervix.  · Using a scope to view the cervix (colposcopy). This may be done if a pelvic exam or Pap test is abnormal. A sample of tissue may be removed for testing (biopsy) during the colposcopy.  Currently, there is no test to detect HPV in males.  How is this treated?  There is no treatment for the virus itself. However, there are treatments for the health problems and symptoms HPV can cause. Treatment for HPV may include:  · Medicines in a cream, lotion, liquid, or gel form. These medicines may be injected into or applied to genital or anal warts.  · Use of a probe to apply extreme cold (cryotherapy) to the genital or anal warts.  · Application of an intense beam of light (laser treatment) on the genital or anal warts.  · Use of a probe to apply extreme heat (electrocautery) on the genital or anal warts.  · Surgery to remove the genital or anal warts.  Your health care provider will monitor you closely after you are treated. HPV can come back and you may need treatment again.  Follow these instructions at home:  Medicines  · Take over-the-counter and prescription medicines only as told by your health care provider. This include creams for itching or irritation.  · Do not treat genital or anal warts with medicines used for treating hand warts.  General instructions  · Do not touch or scratch the warts.  · Do not have sex while you are being treated.  · Do not douche or use tampons during treatment (for women).  · Tell your sex  partner about your infection. He or she may also need to be treated.  · If you become pregnant, tell your health care provider that you have HPV. Your health care provider will monitor you closely during pregnancy to make sure you and your baby are safe.  · Keep all follow-up visits as told by your health care provider. This is important.  How is this prevented?  · Talk with your health care provider about getting an HPV vaccine, which can prevent some HPV infections and related cancers. It will not work if you already have HPV and is not recommended for pregnant women. You may need 2-3 doses of the vaccine, depending on your age.  · After treatment, use condoms during sex to prevent future infections.  · Have only one sex partner.  · Have a sex partner who does not have other sex partners.  · Get regular Pap tests as directed by your health care provider.  Contact a health care provider if:  · The treated skin becomes red, swollen, or painful.  · You have a fever.  · You feel generally ill.  · You feel lumps or pimples in and around your genital or anal area.  · You develop bleeding of the vagina or the treatment area.  · You have painful sex.  Summary  · Human papillomavirus (HPV) is the most common sexually transmitted infection (STI) and is highly contagious.  · Most people carrying HPV do not have any symptoms.  · Many forms of HPV can be prevented with vaccination.  · There is no treatment for the virus itself. However, there are treatments for the health problems and symptoms HPV can cause.  This information is not intended to replace advice given to you by your health care provider. Make sure you discuss any questions you have with your health care provider.  Document Released: 03/09/2005 Document Revised: 08/25/2020 Document Reviewed: 08/15/2019  [employer] Patient Education © 2020 [employer]        Return if symptoms worsen or fail to improve.    Ambulatory progress note signed and attested to by Francisco Javier  ISSA Miller.

## 2020-10-14 NOTE — PATIENT INSTRUCTIONS
Unclear etiology, depending on the evolution of the lesions differential includes HPV, syphilis (unlikely), or molluscum contagiosum.    Human Papillomavirus  Human papillomavirus (HPV) is the most common sexually transmitted infection (STI). It spreads easily from person to person (ishighly contagious). There are many types of HPV. It usually does not cause symptoms. However, sometimes it may cause wart-like lesions in the throat or warts in the genital area. It is possible to be infected for a long time and pass HPV to others without knowing it.  Certain types of HPV may cause cancers, including cancer of the lower part of the uterus (cervix), vagina, outer female genital area (vulva), penis, anus, and rectum. HPV may also cause cancers of the oral cavity, such as the throat, tongue, and tonsils.  What are the causes?  HPV is caused by a virus that spreads from person to person through oral, vaginal, or anal sex.  What increases the risk?  You may be more likely to develop this condition if you have or have had:  · Unprotected oral, vaginal, or anal sex.  · Several sex partners.  · A sex partner who has other sex partners.  · Another STI.  · A weak disease-fighting system (immune system).  · Damaged skin in the genital, oral, or anal area.  What are the signs or symptoms?  Most people who have HPV do not have any symptoms. If symptoms are present, they may include:  · Wart-like lesions in the throat (from having oral sex).  · Warts on the infected skin or mucous membranes.  · Genital warts that may itch, burn, bleed, or be painful during sex.  How is this diagnosed?  If you have wart-like lumps in the anal area or throat, or if genital warts are present, your health care provider can usually diagnose HPV with a physical exam. Genital warts are easily seen.  In females, tests may be used to diagnose HPV, including:  · A Pap test. A Pap test takes a sample of cells from the cervix to check for cancer and HPV  infection.  · An HPV test. This is similar to a Pap test and involves taking a sample of cells from the cervix.  · Using a scope to view the cervix (colposcopy). This may be done if a pelvic exam or Pap test is abnormal. A sample of tissue may be removed for testing (biopsy) during the colposcopy.  Currently, there is no test to detect HPV in males.  How is this treated?  There is no treatment for the virus itself. However, there are treatments for the health problems and symptoms HPV can cause. Treatment for HPV may include:  · Medicines in a cream, lotion, liquid, or gel form. These medicines may be injected into or applied to genital or anal warts.  · Use of a probe to apply extreme cold (cryotherapy) to the genital or anal warts.  · Application of an intense beam of light (laser treatment) on the genital or anal warts.  · Use of a probe to apply extreme heat (electrocautery) on the genital or anal warts.  · Surgery to remove the genital or anal warts.  Your health care provider will monitor you closely after you are treated. HPV can come back and you may need treatment again.  Follow these instructions at home:  Medicines  · Take over-the-counter and prescription medicines only as told by your health care provider. This include creams for itching or irritation.  · Do not treat genital or anal warts with medicines used for treating hand warts.  General instructions  · Do not touch or scratch the warts.  · Do not have sex while you are being treated.  · Do not douche or use tampons during treatment (for women).  · Tell your sex partner about your infection. He or she may also need to be treated.  · If you become pregnant, tell your health care provider that you have HPV. Your health care provider will monitor you closely during pregnancy to make sure you and your baby are safe.  · Keep all follow-up visits as told by your health care provider. This is important.  How is this prevented?  · Talk with your health  care provider about getting an HPV vaccine, which can prevent some HPV infections and related cancers. It will not work if you already have HPV and is not recommended for pregnant women. You may need 2-3 doses of the vaccine, depending on your age.  · After treatment, use condoms during sex to prevent future infections.  · Have only one sex partner.  · Have a sex partner who does not have other sex partners.  · Get regular Pap tests as directed by your health care provider.  Contact a health care provider if:  · The treated skin becomes red, swollen, or painful.  · You have a fever.  · You feel generally ill.  · You feel lumps or pimples in and around your genital or anal area.  · You develop bleeding of the vagina or the treatment area.  · You have painful sex.  Summary  · Human papillomavirus (HPV) is the most common sexually transmitted infection (STI) and is highly contagious.  · Most people carrying HPV do not have any symptoms.  · Many forms of HPV can be prevented with vaccination.  · There is no treatment for the virus itself. However, there are treatments for the health problems and symptoms HPV can cause.  This information is not intended to replace advice given to you by your health care provider. Make sure you discuss any questions you have with your health care provider.  Document Released: 03/09/2005 Document Revised: 08/25/2020 Document Reviewed: 08/15/2019  [employer] Patient Education © 2020 [employer]

## 2020-10-29 DIAGNOSIS — F90.1 ATTENTION DEFICIT HYPERACTIVITY DISORDER (ADHD), PREDOMINANTLY HYPERACTIVE TYPE: ICD-10-CM

## 2020-10-29 RX ORDER — DEXTROAMPHETAMINE SACCHARATE, AMPHETAMINE ASPARTATE MONOHYDRATE, DEXTROAMPHETAMINE SULFATE AND AMPHETAMINE SULFATE 2.5; 2.5; 2.5; 2.5 MG/1; MG/1; MG/1; MG/1
10 CAPSULE, EXTENDED RELEASE ORAL EVERY MORNING
Qty: 30 CAPSULE | Refills: 0 | Status: SHIPPED | OUTPATIENT
Start: 2020-10-29 | End: 2020-11-19 | Stop reason: SDUPTHER

## 2020-10-29 NOTE — TELEPHONE ENCOUNTER
Last fill: 09/30/20  Last office visit: 10/14/20  Next office visit: 11/19/20  Last Myke: 08/19/20  Controlled substance contract on file? yes  Last UDS: 08/19/20

## 2020-11-19 ENCOUNTER — TELEMEDICINE (OUTPATIENT)
Dept: FAMILY MEDICINE CLINIC | Facility: CLINIC | Age: 30
End: 2020-11-19

## 2020-11-19 DIAGNOSIS — F90.1 ATTENTION DEFICIT HYPERACTIVITY DISORDER (ADHD), PREDOMINANTLY HYPERACTIVE TYPE: Primary | ICD-10-CM

## 2020-11-19 PROCEDURE — 99442 PR PHYS/QHP TELEPHONE EVALUATION 11-20 MIN: CPT | Performed by: FAMILY MEDICINE

## 2020-11-19 RX ORDER — DEXTROAMPHETAMINE SACCHARATE, AMPHETAMINE ASPARTATE MONOHYDRATE, DEXTROAMPHETAMINE SULFATE AND AMPHETAMINE SULFATE 2.5; 2.5; 2.5; 2.5 MG/1; MG/1; MG/1; MG/1
10 CAPSULE, EXTENDED RELEASE ORAL EVERY MORNING
Qty: 30 CAPSULE | Refills: 0 | Status: SHIPPED | OUTPATIENT
Start: 2020-11-19 | End: 2020-12-29 | Stop reason: SDUPTHER

## 2020-11-19 NOTE — PROGRESS NOTES
Subjective   Mayo Landers is a 30 y.o. male.     Chief Complaint   Patient presents with   • Follow-up     ADHD       History of Present Illness     Mayo Landers presents today for   Chief Complaint   Patient presents with   • Follow-up     ADHD       He has had no problems with his medications.  His symptoms are under good control.  He has an appointment scheduled with a dermatologist next Wednesday, he had some papular lesions on his genitals consistent with HPV in August.    You have chosen to receive care through a telephone visit. Do you consent to use a telephone visit for your medical care today? Yes     The following portions of the patient's history were reviewed and updated as appropriate: allergies, current medications, past family history, past medical history, past social history, past surgical history and problem list.    Active Ambulatory Problems     Diagnosis Date Noted   • Attention deficit hyperactivity disorder (ADHD), predominantly hyperactive type 04/25/2019     Resolved Ambulatory Problems     Diagnosis Date Noted   • No Resolved Ambulatory Problems     Past Medical History:   Diagnosis Date   • Anxiety    • Chronic bronchitis (CMS/HCC)    • Depression    • Fatty liver    • Fracture      Past Surgical History:   Procedure Laterality Date   • EAR TUBES       Family History   Problem Relation Age of Onset   • Breast cancer Mother    • Hyperlipidemia Father    • Hypertension Father    • No Known Problems Sister    • Obesity Brother    • Klinefelter's syndrome Brother    • Breast cancer Maternal Grandmother      Social History     Socioeconomic History   • Marital status: Single     Spouse name: Not on file   • Number of children: Not on file   • Years of education: Not on file   • Highest education level: Not on file   Tobacco Use   • Smoking status: Current Every Day Smoker     Types: Electronic Cigarette   • Smokeless tobacco: Never Used   Substance and Sexual Activity   • Alcohol use: Yes      Comment: Ocassionally   • Drug use: No       Review of Systems  Review of Systems -  General ROS: negative for - chills, fever or night sweats  Cardiovascular ROS: no chest pain or dyspnea on exertion  Gastrointestinal ROS: no abdominal pain, change in bowel habits, or black or bloody stools  Genito-Urinary ROS: no dysuria, trouble voiding, or hematuria    Objective   There were no vitals taken for this visit.  Vitals obtained from patient if available  Physical Exam  Const: No indications of acute distress  Cardiac: Regular rate by pulse  Resp: Respiratory rate observed to be within normal limits, no increased work of breathing observed, no audible wheezing or cough noted  Psych: Appropriate mood and behavior. Speech is normal.  Procedures  Assessment/Plan   Problem List Items Addressed This Visit        Other    Attention deficit hyperactivity disorder (ADHD), predominantly hyperactive type - Primary    Overview     Adderall XR 10 mg daily         Relevant Medications    amphetamine-dextroamphetamine XR (Adderall XR) 10 MG 24 hr capsule          See patient diagnoses and orders along with patient instructions for assessment, plan, and changes to care for patient.    This visit was conducted via telemedicine with live audio provided by telephone at the point of care. Phone Visit Duration: 15 minutes was spent on the phone with patient coordinating care, discussing his case and current plan, and engaging in shared medical decision making.    Patient Instructions   Wednesday 11/25/20 2:00 PM  [facility] [address] [place] [address]      Return in about 3 months (around 2/19/2021).    Ambulatory progress note signed and attested to by Francisco Javier Miller D.O.

## 2020-11-30 DIAGNOSIS — F90.1 ATTENTION DEFICIT HYPERACTIVITY DISORDER (ADHD), PREDOMINANTLY HYPERACTIVE TYPE: ICD-10-CM

## 2020-12-01 RX ORDER — DEXTROAMPHETAMINE SACCHARATE, AMPHETAMINE ASPARTATE MONOHYDRATE, DEXTROAMPHETAMINE SULFATE AND AMPHETAMINE SULFATE 2.5; 2.5; 2.5; 2.5 MG/1; MG/1; MG/1; MG/1
10 CAPSULE, EXTENDED RELEASE ORAL EVERY MORNING
Qty: 30 CAPSULE | Refills: 0 | OUTPATIENT
Start: 2020-12-01

## 2020-12-01 NOTE — TELEPHONE ENCOUNTER
Patient called back it is not time for him to  refill yet the one sent 11/19/20 is still on file at the pharmacy he was just requesting it early to make sure it would be ready in time once refill is due. Advised patient system does show it went on 11/19/20 and that he can call his pharmacy to make sure it is on file.atient understands no further questions or concerns.

## 2020-12-29 DIAGNOSIS — F90.1 ATTENTION DEFICIT HYPERACTIVITY DISORDER (ADHD), PREDOMINANTLY HYPERACTIVE TYPE: ICD-10-CM

## 2020-12-29 RX ORDER — DEXTROAMPHETAMINE SACCHARATE, AMPHETAMINE ASPARTATE MONOHYDRATE, DEXTROAMPHETAMINE SULFATE AND AMPHETAMINE SULFATE 2.5; 2.5; 2.5; 2.5 MG/1; MG/1; MG/1; MG/1
10 CAPSULE, EXTENDED RELEASE ORAL EVERY MORNING
Qty: 30 CAPSULE | Refills: 0 | Status: SHIPPED | OUTPATIENT
Start: 2020-12-29 | End: 2021-01-26 | Stop reason: SDUPTHER

## 2020-12-29 NOTE — TELEPHONE ENCOUNTER
Caller: Mayo Landers    Relationship: Self    Best call back number: [phone]  Medication needed:   Requested Prescriptions     Pending Prescriptions Disp Refills   • amphetamine-dextroamphetamine XR (Adderall XR) 10 MG 24 hr capsule 30 capsule 0     Sig: Take 1 capsule by mouth Every Morning       When do you need the refill by: 12/29/20    What details did the patient provide when requesting the medication: patient has 1 left     Does the patient have less than a 3 day supply:  [x] Yes  [] No    What is the patient's preferred pharmacy: [facility] [address] [facility] [phone] [facility] [phone] FX

## 2021-01-26 DIAGNOSIS — F90.1 ATTENTION DEFICIT HYPERACTIVITY DISORDER (ADHD), PREDOMINANTLY HYPERACTIVE TYPE: ICD-10-CM

## 2021-01-26 NOTE — TELEPHONE ENCOUNTER
Last fill: 12/29/20  Last office visit: 11/19/20  Next office visit: None  CSA up-to-date? 8/19/20  Date of last UDS: 8/19/20  UDS consistent: consistent

## 2021-01-26 NOTE — TELEPHONE ENCOUNTER
Caller: Mayo Landers    Relationship: Self    Best call back number: [phone]   Medication needed:   Requested Prescriptions     Pending Prescriptions Disp Refills   • amphetamine-dextroamphetamine XR (Adderall XR) 10 MG 24 hr capsule 30 capsule 0     Sig: Take 1 capsule by mouth Every Morning       When do you need the refill by: SOON    What details did the patient provide when requesting the medication: HAS 3 DAYS LEFT    Does the patient have less than a 3 day supply:  [] Yes  [x] No    What is the patient's preferred pharmacy: [facility] [address] & [address] [facility] [phone] [facility] [phone] FX

## 2021-01-27 RX ORDER — DEXTROAMPHETAMINE SACCHARATE, AMPHETAMINE ASPARTATE MONOHYDRATE, DEXTROAMPHETAMINE SULFATE AND AMPHETAMINE SULFATE 2.5; 2.5; 2.5; 2.5 MG/1; MG/1; MG/1; MG/1
10 CAPSULE, EXTENDED RELEASE ORAL EVERY MORNING
Qty: 30 CAPSULE | Refills: 0 | Status: SHIPPED | OUTPATIENT
Start: 2021-01-27 | End: 2021-02-24 | Stop reason: SDUPTHER

## 2021-02-24 DIAGNOSIS — F90.1 ATTENTION DEFICIT HYPERACTIVITY DISORDER (ADHD), PREDOMINANTLY HYPERACTIVE TYPE: ICD-10-CM

## 2021-02-24 RX ORDER — DEXTROAMPHETAMINE SACCHARATE, AMPHETAMINE ASPARTATE MONOHYDRATE, DEXTROAMPHETAMINE SULFATE AND AMPHETAMINE SULFATE 2.5; 2.5; 2.5; 2.5 MG/1; MG/1; MG/1; MG/1
10 CAPSULE, EXTENDED RELEASE ORAL EVERY MORNING
Qty: 30 CAPSULE | Refills: 0 | Status: SHIPPED | OUTPATIENT
Start: 2021-02-24 | End: 2021-03-23 | Stop reason: SDUPTHER

## 2021-02-24 NOTE — TELEPHONE ENCOUNTER
Caller: Mayo Landers    Relationship: Self    Best call back number: [phone]   Medication needed:   Requested Prescriptions     Pending Prescriptions Disp Refills   • amphetamine-dextroamphetamine XR (Adderall XR) 10 MG 24 hr capsule 30 capsule 0     Sig: Take 1 capsule by mouth Every Morning       When do you need the refill by: ASAP    What details did the patient provide when requesting the medication: 2 DAYS LEFT    Does the patient have less than a 3 day supply:  [x] Yes  [] No    What is the patient's preferred pharmacy: [facility] [address] & [address] [facility] [phone] [facility] [phone] FX

## 2021-03-23 DIAGNOSIS — F90.1 ATTENTION DEFICIT HYPERACTIVITY DISORDER (ADHD), PREDOMINANTLY HYPERACTIVE TYPE: ICD-10-CM

## 2021-03-24 NOTE — TELEPHONE ENCOUNTER
Last seen(Telemed): 11/19/2020  Next scheduled: Not scheduled   Last fill: 02/24/2021  CSA up to date: 08/19/2020  Date of last UDS: 08/19/2020  UDS consistent: CONSISTENT

## 2021-03-25 ENCOUNTER — OFFICE VISIT (OUTPATIENT)
Dept: FAMILY MEDICINE CLINIC | Facility: CLINIC | Age: 31
End: 2021-03-25

## 2021-03-25 VITALS
HEART RATE: 68 BPM | OXYGEN SATURATION: 98 % | BODY MASS INDEX: 28.14 KG/M2 | DIASTOLIC BLOOD PRESSURE: 80 MMHG | SYSTOLIC BLOOD PRESSURE: 120 MMHG | HEIGHT: 69 IN | WEIGHT: 190 LBS | TEMPERATURE: 96.9 F

## 2021-03-25 DIAGNOSIS — R20.2 NUMBNESS AND TINGLING IN BOTH HANDS: Primary | ICD-10-CM

## 2021-03-25 DIAGNOSIS — R20.0 NUMBNESS AND TINGLING IN BOTH HANDS: Primary | ICD-10-CM

## 2021-03-25 DIAGNOSIS — M25.531 PAIN, WRIST, RIGHT: ICD-10-CM

## 2021-03-25 PROCEDURE — 99214 OFFICE O/P EST MOD 30 MIN: CPT | Performed by: FAMILY MEDICINE

## 2021-03-25 RX ORDER — DEXTROAMPHETAMINE SACCHARATE, AMPHETAMINE ASPARTATE MONOHYDRATE, DEXTROAMPHETAMINE SULFATE AND AMPHETAMINE SULFATE 2.5; 2.5; 2.5; 2.5 MG/1; MG/1; MG/1; MG/1
10 CAPSULE, EXTENDED RELEASE ORAL EVERY MORNING
Qty: 30 CAPSULE | Refills: 0 | Status: SHIPPED | OUTPATIENT
Start: 2021-03-25 | End: 2021-04-20 | Stop reason: SDUPTHER

## 2021-03-25 NOTE — PROGRESS NOTES
"Subjective   Mayo Landers is a 30 y.o. male.     Chief Complaint   Patient presents with   • Wrist Pain     He reports that he has hx of fx wrist. For the past week he has noticed numbness and tingling in his right hand. He states it is not very painful but more of annoyance. He is concerned about carpal tunnel and has been using IBU OTC to help manage the discomfort.        History of Present Illness     Mayo Landers presents today for   Chief Complaint   Patient presents with   • Wrist Pain     He reports that he has hx of fx wrist. For the past week he has noticed numbness and tingling in his right hand. He states it is not very painful but more of annoyance. He is concerned about carpal tunnel and has been using IBU OTC to help manage the discomfort.       He has a past medical history of ADHD and he presents today acutely for hand numbness. This is a new complaint requiring further work up.    He reports approximately 1.5 weeks ago he began having numbness from his elbow to his hand, mainly his thumb, index finger, and middle finger. He typically feels the numbness along the palmar side of his hand and is uncertain if he has ever felt it on the top of his hand. He reports this mainly occurs on the right upper extremity, however, it does occasionally occur on the left. He reports that he typically wakes up and it is completely numb and it also occurs every night. He is uncertain if it happens more often in the mornings compared to the evenings. He believes that the tingling and numbness is getting better, however, he is also not working as hard as he was. Today is the first day he did not have that and felt like he needed to see the doctor. He reports it also happens throughout the day and when he is at work at night. He feels a little \"pressure\" when he moves it in and out. He reports he has previously fractured his right wrist, but this is a different feeling. He denies having an acute injury to it. He " "states his  strength has decreased.     He reports he will occasionally feel an \"annoying\" pain in his wrist and he stops what he is doing and rests. He denies the pain is sharp. He denies having pain in his fingers, just numbness. He reports that he feels a \"popping noise\" in his right wrist when he is working on something and it has done that for years. He believes that it is probably due to lifting. He previously fractured his right wrist twice. He states that he will have swelling in the web of the thumb and the forefinger if he overuses it.    He tries not to sleep with his hand under a pillow. He has tried to move his hand out, but it continued to go numb. Ibuprofen tends to help.    He reports his medication is doing well.    This patient is accompanied by their self who contributes to the history of their care.    The following portions of the patient's history were reviewed and updated as appropriate: allergies, current medications, past family history, past medical history, past social history, past surgical history and problem list.    Active Ambulatory Problems     Diagnosis Date Noted   • Attention deficit hyperactivity disorder (ADHD), predominantly hyperactive type 04/25/2019     Resolved Ambulatory Problems     Diagnosis Date Noted   • No Resolved Ambulatory Problems     Past Medical History:   Diagnosis Date   • Anxiety    • Chronic bronchitis (CMS/HCC)    • Depression    • Fatty liver    • Fracture      Past Surgical History:   Procedure Laterality Date   • EAR TUBES       Family History   Problem Relation Age of Onset   • Breast cancer Mother    • Hyperlipidemia Father    • Hypertension Father    • No Known Problems Sister    • Obesity Brother    • Klinefelter's syndrome Brother    • Breast cancer Maternal Grandmother      Social History     Socioeconomic History   • Marital status: Single     Spouse name: Not on file   • Number of children: Not on file   • Years of education: Not on file   • " "Highest education level: Not on file   Tobacco Use   • Smoking status: Current Every Day Smoker     Types: Electronic Cigarette   • Smokeless tobacco: Never Used   Substance and Sexual Activity   • Alcohol use: Yes     Comment: Ocassionally   • Drug use: No       Review of Systems  Review of Systems -  General ROS: negative for - chills, fever or night sweats  Cardiovascular ROS: no chest pain or dyspnea on exertion  Gastrointestinal ROS: no abdominal pain, change in bowel habits, or black or bloody stools  Genito-Urinary ROS: no dysuria, trouble voiding, or hematuria    Objective   Blood pressure 120/80, pulse 68, temperature 96.9 °F (36.1 °C), height 175.3 cm (69\"), weight 86.2 kg (190 lb), SpO2 98 %.  Nursing note reviewed  Physical Exam  Musculoskeletal:      Comments:  Numbness and tingling in bilateral upper extremities; Negative Phalen sign, Negative Tinel sign, Negative Finkelstein maneuver.       Const: NAD, A&Ox4, Pleasant, Cooperative  Procedures  Assessment/Plan   Problem List Items Addressed This Visit     None      Visit Diagnoses     Numbness and tingling in both hands    -  Primary    Relevant Orders    XR Wrist 3+ View Right    Pain, wrist, right        Relevant Orders    XR Wrist 3+ View Right          1. Right wrist pain.  - There are no significant abnormalities on exam today. It may be due to previous fractures and altered mechanics, He can continue to use ibuprofen. We will get an x-ray to assess his anatomy, otherwise he can follow up as needed and use ibuprofen as needed.    2. Numbness and tingling of the bilateral upper extremities.  - It is mostly distal to the elbow bilaterally, affecting the median nerve distribution. It does seem position, but again, no red flags. We reviewed causes for concern including if the issue persists, we would get a nerve conduction study and a referral to a hand specialist. In the meantime, he can use ibuprofen as needed and continue to monitor.    3. ADHD.  - " He is stable on Adderall XR at this time and is compliant with monitoring. There are no signs or abuse or diversion. The MAYURI was reviewed today and is appropriate. A refill was sent in earlier today in a separate encounter.     There are no Patient Instructions on file for this visit.    Return in about 3 months (around 6/25/2021) for Controlled substance 3-month.    Ambulatory progress note signed and attested to by Francisco Javier Miller D.O.             Scribed for Francisco Javier Miller DO by Malachi Fritz.  03/25/21   14:39 EDT    I have personally performed the services described in this document as scribed by the above individual, and it is both accurate and complete.  Francisco Javier Miller DO  3/25/2021  14:52 EDT

## 2021-04-09 ENCOUNTER — OFFICE VISIT (OUTPATIENT)
Dept: FAMILY MEDICINE CLINIC | Facility: CLINIC | Age: 31
End: 2021-04-09

## 2021-04-09 VITALS
HEIGHT: 69 IN | SYSTOLIC BLOOD PRESSURE: 128 MMHG | DIASTOLIC BLOOD PRESSURE: 80 MMHG | WEIGHT: 196 LBS | HEART RATE: 97 BPM | BODY MASS INDEX: 29.03 KG/M2 | OXYGEN SATURATION: 98 %

## 2021-04-09 DIAGNOSIS — M25.531 PAIN, WRIST, RIGHT: Primary | ICD-10-CM

## 2021-04-09 DIAGNOSIS — R20.2 NUMBNESS AND TINGLING IN BOTH HANDS: ICD-10-CM

## 2021-04-09 DIAGNOSIS — R20.0 NUMBNESS AND TINGLING IN BOTH HANDS: ICD-10-CM

## 2021-04-09 PROCEDURE — 99213 OFFICE O/P EST LOW 20 MIN: CPT | Performed by: FAMILY MEDICINE

## 2021-04-09 NOTE — PROGRESS NOTES
Subjective   Mayo Landers is a 30 y.o. male.     Chief Complaint   Patient presents with   • Carpal Tunnel     pt states he was at work a few weeks ago and his hand started going numb up to his elbow        History of Present Illness     Mayo Landers presents today for   Chief Complaint   Patient presents with   • Carpal Tunnel     pt states he was at work a few weeks ago and his hand started going numb up to his elbow       He presents today for bilateral upper extremity paresthesia. He was seen on 03/25/2021 for upper extremity paresthesia, it was more significant on the right. He was having some wrist pain at the time and an x-ray was ordered, but he ended up not having this done. The patient has not worked since 04/05/2021,T and notes that his pain has improved. If his hand is open, he does not feel as much pain as when his hand is in a fist. His  is improving. He has numbness and tingling present. He is not wearing a brace. The patient notes that his next shift is on 04/10/2021 and it is only 5 hours compared to his usual 10 or 15. He spoke to  and was assigned flex shifts until his pain has subsided.    This patient is accompanied by their self who contributes to the history of their care.    The following portions of the patient's history were reviewed and updated as appropriate: allergies, current medications, past family history, past medical history, past social history, past surgical history and problem list.    Active Ambulatory Problems     Diagnosis Date Noted   • Attention deficit hyperactivity disorder (ADHD), predominantly hyperactive type 04/25/2019     Resolved Ambulatory Problems     Diagnosis Date Noted   • No Resolved Ambulatory Problems     Past Medical History:   Diagnosis Date   • Anxiety    • Chronic bronchitis (CMS/HCC)    • Depression    • Fatty liver    • Fracture      Past Surgical History:   Procedure Laterality Date   • EAR TUBES       Family History   Problem Relation Age  "of Onset   • Breast cancer Mother    • Hyperlipidemia Father    • Hypertension Father    • No Known Problems Sister    • Obesity Brother    • Klinefelter's syndrome Brother    • Breast cancer Maternal Grandmother      Social History     Socioeconomic History   • Marital status: Single     Spouse name: Not on file   • Number of children: Not on file   • Years of education: Not on file   • Highest education level: Not on file   Tobacco Use   • Smoking status: Current Every Day Smoker     Packs/day: 0.00     Years: 2.00     Pack years: 0.00     Types: Electronic Cigarette   • Smokeless tobacco: Never Used   Substance and Sexual Activity   • Alcohol use: Yes     Comment: Ocassionally   • Drug use: No       Review of Systems  Review of Systems -  General ROS: negative for - chills, fever or night sweats  Cardiovascular ROS: no chest pain or dyspnea on exertion  Gastrointestinal ROS: no abdominal pain, change in bowel habits, or black or bloody stools  Genito-Urinary ROS: no dysuria, trouble voiding, or hematuria    Objective   Blood pressure 128/80, pulse 97, height 175.3 cm (69.02\"), weight 88.9 kg (196 lb), SpO2 98 %.  Nursing note reviewed  Physical Exam  Const: NAD, A&Ox4, Pleasant, Cooperative  Eyes: EOMI, no conjunctivitis  ENT: No nasal discharge present, neck supple  Cardiac: Regular rate and rhythm, no cyanosis  Resp: Respiratory rate within normal limits, no increased work of breathing, no audible wheezing or retractions noted  GI: No distention or ascites  MSK: Motor and sensation grossly intact in bilateral upper extremities  Neurologic: CN II-XII grossly intact  Psych: Appropriate mood and behavior.  Skin: Warm, dry  Procedures  Assessment/Plan   Problem List Items Addressed This Visit     None      Visit Diagnoses     Pain, wrist, right    -  Primary    Numbness and tingling in both hands              1. Right wrist pain  - The patient continues to have a small amount of flexor tenosynovitis, an x-ray would " be mainly to check for a Madelung deformity or other anatomic predisposition. No work restrictions at this time.    2. Bilateral Carpal Tunnel Syndrome, mild.   - Positional median nerve compression. Does not need any special care.     There are no Patient Instructions on file for this visit.    No follow-ups on file.    Ambulatory progress note signed and attested to by Francisco Javier Miller D.O.             Scribed for Francisco Javier Miller DO by DAVE LUCAS.  04/09/21   16:51 EDT    I have personally performed the services described in this document as scribed by the above individual, and it is both accurate and complete.  Francisco Javier Miller DO  4/25/2021  18:42 EDT

## 2021-04-20 DIAGNOSIS — F90.1 ATTENTION DEFICIT HYPERACTIVITY DISORDER (ADHD), PREDOMINANTLY HYPERACTIVE TYPE: ICD-10-CM

## 2021-04-20 NOTE — TELEPHONE ENCOUNTER
Last fill: 3/25/21  Last office visit: 4/9/21  Next office visit: 6/28/21  CSA up-to-date? 8/19/20  Date of last UDS: 8/19/20  UDS consistent: consistent

## 2021-04-20 NOTE — TELEPHONE ENCOUNTER
Caller: Mayo Landers    Relationship: Self    Best call back number: [phone]  Medication needed:   Requested Prescriptions     Pending Prescriptions Disp Refills   • amphetamine-dextroamphetamine XR (Adderall XR) 10 MG 24 hr capsule 30 capsule 0     Sig: Take 1 capsule by mouth Every Morning       When do you need the refill by: 04/23/21    What additional details did the patient provide when requesting the medication:     Does the patient have less than a 3 day supply:  [x] Yes  [] No    What is the patient's preferred pharmacy:     [facility] [address] [facility] [phone] [facility] [phone] FX

## 2021-04-21 RX ORDER — DEXTROAMPHETAMINE SACCHARATE, AMPHETAMINE ASPARTATE MONOHYDRATE, DEXTROAMPHETAMINE SULFATE AND AMPHETAMINE SULFATE 2.5; 2.5; 2.5; 2.5 MG/1; MG/1; MG/1; MG/1
10 CAPSULE, EXTENDED RELEASE ORAL EVERY MORNING
Qty: 30 CAPSULE | Refills: 0 | Status: SHIPPED | OUTPATIENT
Start: 2021-04-21 | End: 2021-05-20 | Stop reason: SDUPTHER

## 2021-05-20 DIAGNOSIS — F90.1 ATTENTION DEFICIT HYPERACTIVITY DISORDER (ADHD), PREDOMINANTLY HYPERACTIVE TYPE: ICD-10-CM

## 2021-05-20 NOTE — TELEPHONE ENCOUNTER
Caller: Mayo Landers    Relationship: Self    Best call back number:     [phone]    Medication needed:   Requested Prescriptions     Pending Prescriptions Disp Refills   • amphetamine-dextroamphetamine XR (Adderall XR) 10 MG 24 hr capsule 30 capsule 0     Sig: Take 1 capsule by mouth Every Morning     When do you need the refill by:    Friday, 5/21/21    What additional details did the patient provide when requesting the medication:     PATIENT STATED HE HAS (2) CAPSULES LEFT    Does the patient have less than a 3 day supply:  [x] Yes  [] No    What is the patient's preferred pharmacy:      [place], [place]    TELEPHONE CONTACT:    [phone]    FAX:    [phone]    DR CONTEH, DO

## 2021-05-20 NOTE — TELEPHONE ENCOUNTER
Last fill: 4/21/21  Last office visit: 4/9/21  Next office visit: 6/28/21  CSA up-to-date? 8/19/20  Date of last UDS: 8/19/20  UDS consistent: consistent

## 2021-05-21 RX ORDER — DEXTROAMPHETAMINE SACCHARATE, AMPHETAMINE ASPARTATE MONOHYDRATE, DEXTROAMPHETAMINE SULFATE AND AMPHETAMINE SULFATE 2.5; 2.5; 2.5; 2.5 MG/1; MG/1; MG/1; MG/1
10 CAPSULE, EXTENDED RELEASE ORAL EVERY MORNING
Qty: 30 CAPSULE | Refills: 0 | Status: SHIPPED | OUTPATIENT
Start: 2021-05-21 | End: 2021-06-18 | Stop reason: SDUPTHER

## 2021-06-18 DIAGNOSIS — F90.1 ATTENTION DEFICIT HYPERACTIVITY DISORDER (ADHD), PREDOMINANTLY HYPERACTIVE TYPE: ICD-10-CM

## 2021-06-18 NOTE — TELEPHONE ENCOUNTER
Caller: Mayo Landers    Relationship: Self    Best call back number: [phone]    Medication needed:   Requested Prescriptions     Pending Prescriptions Disp Refills   • amphetamine-dextroamphetamine XR (Adderall XR) 10 MG 24 hr capsule 30 capsule 0     Sig: Take 1 capsule by mouth Every Morning       When do you need the refill by: SOON    What additional details did the patient provide when requesting the medication: PATIENT HAS ABOUT 4 DAYS OF MEDICATION REMAINING.    Does the patient have less than a 3 day supply:  [] Yes  [x] No    What is the patient's preferred pharmacy: [facility] [address] [facility] [phone] [facility] [phone] FX

## 2021-06-21 RX ORDER — DEXTROAMPHETAMINE SACCHARATE, AMPHETAMINE ASPARTATE MONOHYDRATE, DEXTROAMPHETAMINE SULFATE AND AMPHETAMINE SULFATE 2.5; 2.5; 2.5; 2.5 MG/1; MG/1; MG/1; MG/1
10 CAPSULE, EXTENDED RELEASE ORAL EVERY MORNING
Qty: 30 CAPSULE | Refills: 0 | Status: SHIPPED | OUTPATIENT
Start: 2021-06-21 | End: 2021-07-23 | Stop reason: SDUPTHER

## 2021-06-21 NOTE — TELEPHONE ENCOUNTER
Caller: Mayo Landers    Relationship: Self    Best call back number: [phone]    Medication needed:   Requested Prescriptions     Pending Prescriptions Disp Refills   • amphetamine-dextroamphetamine XR (Adderall XR) 10 MG 24 hr capsule 30 capsule 0     Sig: Take 1 capsule by mouth Every Morning       When do you need the refill by: 06/22/2021    What additional details did the patient provide when requesting the medication: PATIENT STATES THAT HE HAS A LITTLE BIT OF MEDICATION LEFT. HE STATES THAT HE HAS ENOUGH MEDICATION (06/23/2021.) THE PATIENT STATES HE IS UNSURE WHERE THIS MEDICATION REFILL IS AT IN THE PROCESS.     Does the patient have less than a 3 day supply:  [x] Yes  [] No    What is the patient's preferred pharmacy: [facility] [address] [facility] [phone] [facility] [phone] FX

## 2021-07-14 ENCOUNTER — OFFICE VISIT (OUTPATIENT)
Dept: FAMILY MEDICINE CLINIC | Facility: CLINIC | Age: 31
End: 2021-07-14

## 2021-07-14 VITALS
WEIGHT: 191.4 LBS | HEART RATE: 82 BPM | OXYGEN SATURATION: 98 % | HEIGHT: 69 IN | RESPIRATION RATE: 18 BRPM | SYSTOLIC BLOOD PRESSURE: 118 MMHG | DIASTOLIC BLOOD PRESSURE: 76 MMHG | BODY MASS INDEX: 28.35 KG/M2

## 2021-07-14 DIAGNOSIS — K04.7 DENTAL ABSCESS: ICD-10-CM

## 2021-07-14 DIAGNOSIS — F90.1 ATTENTION DEFICIT HYPERACTIVITY DISORDER (ADHD), PREDOMINANTLY HYPERACTIVE TYPE: Primary | ICD-10-CM

## 2021-07-14 PROCEDURE — 99213 OFFICE O/P EST LOW 20 MIN: CPT | Performed by: FAMILY MEDICINE

## 2021-07-14 PROCEDURE — 80307 DRUG TEST PRSMV CHEM ANLYZR: CPT | Performed by: FAMILY MEDICINE

## 2021-07-14 PROCEDURE — G0483 DRUG TEST DEF 22+ CLASSES: HCPCS | Performed by: FAMILY MEDICINE

## 2021-07-14 RX ORDER — AMOXICILLIN 500 MG/1
500 CAPSULE ORAL 2 TIMES DAILY
Qty: 14 CAPSULE | Refills: 0 | Status: SHIPPED | OUTPATIENT
Start: 2021-07-14 | End: 2021-07-21

## 2021-07-14 NOTE — PROGRESS NOTES
Subjective   Mayo Landers is a 31 y.o. male.     Chief Complaint   Patient presents with   • ADHD     3 month       History of Present Illness     Mayo Landers presents today for   Chief Complaint   Patient presents with   • ADHD     3 month     The patient presents today for a regular 3-month follow-up on ADHD. He reports no changes in his medications since 04/2021. He takes Adderall XR 10 mg in the morning anywhere from 10 a.m. to noon. He works night shift. He goes to the gym in the morning if he has time to get situated and then takes it right before he starts his day.    He reports the gum behind his wisdom tooth has been inflamed for the past few days. The pain starts in his gum and radiates throughout the side of his face. He does not see a dentist. The patient used to be allergic to penicillin, but is no longer allergic to it. 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    This patient is accompanied by their self who contributes to the history of their care.    The following portions of the patient's history were reviewed and updated as appropriate: allergies, current medications, past family history, past medical history, past social history, past surgical history and problem list.    Active Ambulatory Problems     Diagnosis Date Noted   • Attention deficit hyperactivity disorder (ADHD), predominantly hyperactive type 04/25/2019     Resolved Ambulatory Problems     Diagnosis Date Noted   • No Resolved Ambulatory Problems     Past Medical History:   Diagnosis Date   • Anxiety    • Chronic bronchitis (CMS/HCC)    • Depression    • Fatty liver    • Fracture      Past Surgical History:   Procedure Laterality Date   • EAR TUBES       Family History   Problem Relation Age of Onset   • Breast cancer Mother    • Hyperlipidemia Father    • Hypertension Father    • No Known Problems Sister    • Obesity Brother    • Klinefelter's syndrome Brother    • Breast cancer Maternal Grandmother      Social  "History     Socioeconomic History   • Marital status: Single     Spouse name: Not on file   • Number of children: Not on file   • Years of education: Not on file   • Highest education level: Not on file   Tobacco Use   • Smoking status: Current Every Day Smoker     Packs/day: 0.00     Years: 2.00     Pack years: 0.00     Types: Electronic Cigarette   • Smokeless tobacco: Never Used   Substance and Sexual Activity   • Alcohol use: Yes     Comment: Ocassionally   • Drug use: No       Review of Systems  See HPI    Objective   Blood pressure 118/76, pulse 82, resp. rate 18, height 175.3 cm (69.02\"), weight 86.8 kg (191 lb 6.4 oz), SpO2 98 %.  Nursing note reviewed  Physical Exam  Const: NAD, A&Ox4, Pleasant, Cooperative  Eyes: EOMI, no conjunctivitis  ENT: No nasal discharge present, neck supple  Cardiac: Regular rate and rhythm, no cyanosis  Resp: Respiratory rate within normal limits, no increased work of breathing, no audible wheezing or retractions noted  GI: No distention or ascites  MSK: Motor and sensation grossly intact in bilateral upper extremities  Neurologic: CN II-XII grossly intact  Psych: Appropriate mood and behavior.  Skin: Warm, dry  Procedures  Assessment/Plan   Problem List Items Addressed This Visit        Mental Health    Attention deficit hyperactivity disorder (ADHD), predominantly hyperactive type - Primary    Overview     Adderall XR 10 mg daily         Relevant Orders    Compliance Drug Analysis, Ur - Urine, Clean Catch      Other Visit Diagnoses     Dental abscess        Relevant Medications    amoxicillin (AMOXIL) 500 MG capsule          1. ADHD.  - Continue Adderall XR 10 mg daily. UDS obtained today. CSA and MAYURI up to date.    2. [place] tooth abscess of the upper right.  - Recommend amoxicillin and follow up with a dentist. It has been a number of years since he has had a dental cleaning. He has severe plaque and periodontal disease on exam today. I recommend flossing twice daily, " WaterPik If he does not like the wax floss. It is imperative that he follow up with the dentist.    Patient Instructions   1.  See a dentist for cleaning and xrays      Return in about 3 months (around 10/14/2021).    Ambulatory progress note signed and attested to by Francisco Javier Miller D.O.       Scribed for Francisco Javier Miller DO by Grisel De Dios.  07/14/21   13:21 EDT    I have personally performed the services described in this document as scribed by the above individual, and it is both accurate and complete.  Francisco Javier Miller DO  7/26/2021  09:14 EDT

## 2021-07-17 ENCOUNTER — TELEPHONE (OUTPATIENT)
Dept: FAMILY MEDICINE CLINIC | Facility: CLINIC | Age: 31
End: 2021-07-17

## 2021-07-20 ENCOUNTER — TELEPHONE (OUTPATIENT)
Dept: INTERNAL MEDICINE | Facility: CLINIC | Age: 31
End: 2021-07-20

## 2021-07-20 NOTE — TELEPHONE ENCOUNTER
After hr call, time 6;00 pm EDT   [facility]ed [facility] , no reply, msg left to call back in 10 minutes   After 10 minutes , called pt again, discussed with him his concern.  He is asking regarding refill on Adderall , suppose to be sent to his pharmacy.  I discussed with pt , we are not able to do any refill on control sub after working hrs. Advised to call his dr office in am for refill.  He stated U/A with the plan     Sonia Gandhi MD

## 2021-07-22 DIAGNOSIS — F90.1 ATTENTION DEFICIT HYPERACTIVITY DISORDER (ADHD), PREDOMINANTLY HYPERACTIVE TYPE: ICD-10-CM

## 2021-07-22 NOTE — TELEPHONE ENCOUNTER
Last fill: 6.20.21  Last office visit: 7.14.21  Next office visit: 10.14.21  Last Myke:  Controlled substance contract on file?  Last UDS: active not back yet

## 2021-07-23 LAB — DRUGS UR: NORMAL

## 2021-07-23 RX ORDER — DEXTROAMPHETAMINE SACCHARATE, AMPHETAMINE ASPARTATE MONOHYDRATE, DEXTROAMPHETAMINE SULFATE AND AMPHETAMINE SULFATE 2.5; 2.5; 2.5; 2.5 MG/1; MG/1; MG/1; MG/1
10 CAPSULE, EXTENDED RELEASE ORAL EVERY MORNING
Qty: 30 CAPSULE | Refills: 0 | Status: SHIPPED | OUTPATIENT
Start: 2021-07-23 | End: 2021-08-19 | Stop reason: SDUPTHER

## 2021-07-23 NOTE — TELEPHONE ENCOUNTER
PATIENT REQUESTED ADDERALL XR ON 7/17/2021. CHECKING ON THE STATUS OF THIS. HE HIS OUT OF MEDICATION AT THIS TIME.     REQUESTING A CALL BACK BY END OF DAY. WANTING AN ETA OF WHEN IT WILL BE SENT TO THE PHARM.

## 2021-08-19 DIAGNOSIS — F90.1 ATTENTION DEFICIT HYPERACTIVITY DISORDER (ADHD), PREDOMINANTLY HYPERACTIVE TYPE: ICD-10-CM

## 2021-08-19 RX ORDER — DEXTROAMPHETAMINE SACCHARATE, AMPHETAMINE ASPARTATE MONOHYDRATE, DEXTROAMPHETAMINE SULFATE AND AMPHETAMINE SULFATE 2.5; 2.5; 2.5; 2.5 MG/1; MG/1; MG/1; MG/1
10 CAPSULE, EXTENDED RELEASE ORAL EVERY MORNING
Qty: 30 CAPSULE | Refills: 0 | Status: CANCELLED | OUTPATIENT
Start: 2021-08-19

## 2021-08-19 NOTE — TELEPHONE ENCOUNTER
Rx Refill Note  Requested Prescriptions     Pending Prescriptions Disp Refills   • amphetamine-dextroamphetamine XR (Adderall XR) 10 MG 24 hr capsule 30 capsule 0     Sig: Take 1 capsule by mouth Every Morning      Last office visit with prescribing clinician: 7/14/2021      Next office visit with prescribing clinician: 10/14/2021     CSA:8/19/20 NEEDS UPDATED  UDS:8/19/20 NEEDS UPDATED       Kinza Lebron MA  08/19/21, 15:11 EDT

## 2021-08-19 NOTE — TELEPHONE ENCOUNTER
Patient will need to switch to Vyvanse per insurance. It is a slightly more potent and longer acting version he should do well.

## 2021-08-19 NOTE — TELEPHONE ENCOUNTER
Caller: Mayo Landers    Relationship: Self    Best call back number: [phone]    Medication needed:   Requested Prescriptions     Pending Prescriptions Disp Refills   • amphetamine-dextroamphetamine XR (Adderall XR) 10 MG 24 hr capsule 30 capsule 0     Sig: Take 1 capsule by mouth Every Morning       When do you need the refill by: MONDAY    Does the patient have less than a 3 day supply:  [] Yes  [x] No    What is the patient's preferred pharmacy: [facility] [address] [facility] [phone] [facility] [phone] FX

## 2021-09-16 DIAGNOSIS — F90.1 ATTENTION DEFICIT HYPERACTIVITY DISORDER (ADHD), PREDOMINANTLY HYPERACTIVE TYPE: ICD-10-CM

## 2021-09-16 NOTE — TELEPHONE ENCOUNTER
Rx Refill Note  Requested Prescriptions     Pending Prescriptions Disp Refills   • lisdexamfetamine (Vyvanse) 30 MG capsule 30 capsule 0     Sig: Take 1 capsule by mouth Every Morning      Last office visit with prescribing clinician: 7/14/2021      Next office visit with prescribing clinician: 10/14/2021   Last CSA:08/19/21  Last UDS:07/14/21      {TIP  Please add Last Relevant Lab Date if appropriate:23}     TIMMY BASURTO MA  09/16/21, 13:01 EDT

## 2021-09-16 NOTE — TELEPHONE ENCOUNTER
Caller: Mayo Landers    Relationship: Self    Best call back number:     Medication needed:   Requested Prescriptions     Pending Prescriptions Disp Refills   • lisdexamfetamine (Vyvanse) 30 MG capsule 30 capsule 0     Sig: Take 1 capsule by mouth Every Morning       When do you need the refill by: 09/16/21    What additional details did the patient provide when requesting the medication: patient is not sure how many they have left    Does the patient have less than a 3 day supply:  [] Yes  [] No    What is the patient's [place] pharmacy[place] [facility] [place] [address] [address] [facility] - [phone] [facility] [phone]

## 2021-09-20 DIAGNOSIS — F90.1 ATTENTION DEFICIT HYPERACTIVITY DISORDER (ADHD), PREDOMINANTLY HYPERACTIVE TYPE: ICD-10-CM

## 2021-09-20 NOTE — TELEPHONE ENCOUNTER
Caller: Mayo Landers    Relationship: Self    Best call back number: [phone]    Medication needed:   Requested Prescriptions     Pending Prescriptions Disp Refills   • lisdexamfetamine (Vyvanse) 30 MG capsule 30 capsule 0     Sig: Take 1 capsule by mouth Every Morning       When do you need the refill by:09/20/2021    What additional details did the patient provide when requesting the medication:   PATIENT STATED THAT THIS MEDICATION WAS SENT TO THE WRONG PHARMACY INSTEAD OF BEING CALLED INTO [facility] THIS MEDICATION WAS SENT TO [facility] IN [facility]     PATIENT IS COMPLETELY OUT OF MEDICATION     Does the patient have less than a 3 day supply:  [x] Yes  [] No    What is the patient's preferred pharmacy: [facility] [address] [facility] [phone] [facility] [phone] FX

## 2021-09-20 NOTE — TELEPHONE ENCOUNTER
Rx Refill Note  Requested Prescriptions     Pending Prescriptions Disp Refills   • lisdexamfetamine (Vyvanse) 30 MG capsule 30 capsule 0     Sig: Take 1 capsule by mouth Every Morning      Last office visit with prescribing clinician: 7/14/2021      Next office visit with prescribing clinician: 10/14/2021            Deirdre Nelson MA  09/20/21, 14:22 EDT

## 2021-09-24 ENCOUNTER — OFFICE VISIT (OUTPATIENT)
Dept: FAMILY MEDICINE CLINIC | Facility: CLINIC | Age: 31
End: 2021-09-24

## 2021-09-24 ENCOUNTER — HOSPITAL ENCOUNTER (OUTPATIENT)
Dept: RADIOLOGY | Facility: CLINIC | Age: 31
Discharge: HOME | End: 2021-09-24

## 2021-09-24 VITALS
RESPIRATION RATE: 16 BRPM | WEIGHT: 181 LBS | BODY MASS INDEX: 26.81 KG/M2 | DIASTOLIC BLOOD PRESSURE: 80 MMHG | SYSTOLIC BLOOD PRESSURE: 122 MMHG | HEIGHT: 69 IN | HEART RATE: 99 BPM | TEMPERATURE: 97.6 F | OXYGEN SATURATION: 98 %

## 2021-09-24 DIAGNOSIS — J06.9 UPPER RESPIRATORY TRACT INFECTION DUE TO COVID-19 VIRUS: Primary | ICD-10-CM

## 2021-09-24 DIAGNOSIS — J06.9 UPPER RESPIRATORY TRACT INFECTION DUE TO COVID-19 VIRUS: ICD-10-CM

## 2021-09-24 DIAGNOSIS — U07.1 UPPER RESPIRATORY TRACT INFECTION DUE TO COVID-19 VIRUS: Primary | ICD-10-CM

## 2021-09-24 DIAGNOSIS — U07.1 UPPER RESPIRATORY TRACT INFECTION DUE TO COVID-19 VIRUS: ICD-10-CM

## 2021-09-24 PROCEDURE — 71046 X-RAY EXAM CHEST 2 VIEWS: CPT | Performed by: FAMILY MEDICINE

## 2021-09-24 PROCEDURE — 99214 OFFICE O/P EST MOD 30 MIN: CPT | Performed by: FAMILY MEDICINE

## 2021-09-24 NOTE — PROGRESS NOTES
"Subjective   Mayo Landers is a 31 y.o. male.     Chief Complaint   Patient presents with   • Cough   • Blood in mucus   • Covid Positive       History of Present Illness     Mayo Landers presents today for   Chief Complaint   Patient presents with   • Cough   • Blood in mucus   • Covid Positive     Marianela presents today for an acute visit regarding ongoing cough and blood in his mucus. He is COVID-19 positive.     He tested positive for COVID-19 on 09/16/2020. The onset of his symptoms was 09/14/2021 or 09/15/2021. His symptoms have reportedly improved gradually. He reports having a cough productive of blood-tinged sputum, most often in the mornings, as well as some fatigue. The blood-tinged sputum was concerning to him and he states that he would like to ensure that he has not developed pneumonia. The patient states that he took a decongestant last night and is currently feeling better. He denies any fever over the last week. He denies any shortness of breath; however, he feels as if his \"lungs cannot expand as much.\" He does have a history of bronchitis and is a smoker. The patient has had the COVID-19 vaccinations.     This patient is accompanied by their self who contributes to the history of their care.    The following portions of the patient's history were reviewed and updated as appropriate: allergies, current medications, past family history, past medical history, past social history, past surgical history and problem list.    Active Ambulatory Problems     Diagnosis Date Noted   • Attention deficit hyperactivity disorder (ADHD), predominantly hyperactive type 04/25/2019     Resolved Ambulatory Problems     Diagnosis Date Noted   • No Resolved Ambulatory Problems     Past Medical History:   Diagnosis Date   • Anxiety    • Chronic bronchitis (CMS/HCC)    • Depression    • Fatty liver    • Fracture      Past Surgical History:   Procedure Laterality Date   • EAR TUBES       Family History   Problem " "Relation Age of Onset   • Breast cancer Mother    • Hyperlipidemia Father    • Hypertension Father    • No Known Problems Sister    • Obesity Brother    • Klinefelter's syndrome Brother    • Breast cancer Maternal Grandmother      Social History     Socioeconomic History   • Marital status: Single     Spouse name: Not on file   • Number of children: Not on file   • Years of education: Not on file   • Highest education level: Not on file   Tobacco Use   • Smoking status: Current Every Day Smoker     Packs/day: 0.00     Years: 2.00     Pack years: 0.00     Types: Electronic Cigarette   • Smokeless tobacco: Never Used   Vaping Use   • Vaping Use: Every day   Substance and Sexual Activity   • Alcohol use: Yes     Comment: Ocassionally   • Drug use: No       Review of Systems  See HPI    Objective   Blood pressure 122/80, pulse 99, temperature 97.6 °F (36.4 °C), resp. rate 16, height 175.3 cm (69.02\"), weight 82.1 kg (181 lb), SpO2 98 %.  Nursing note reviewed  Physical Exam  Const: NAD, A&Ox4, Pleasant, Cooperative  Eyes: EOMI, no conjunctivitis  ENT: No nasal discharge present, neck supple  Cardiac: Regular rate and rhythm, no cyanosis  Resp: Respiratory rate within normal limits, no increased work of breathing, no audible wheezing or retractions noted  GI: No distention or ascites  MSK: Motor and sensation grossly intact in bilateral upper extremities  Neurologic: CN II-XII grossly intact  Psych: Appropriate mood and behavior.  Skin: Warm, dry  Procedures  Assessment/Plan   Problem List Items Addressed This Visit     None      Visit Diagnoses     Upper respiratory tract infection due to COVID-19 virus    -  Primary    Relevant Orders    XR Chest PA & Lateral          1. Upper respiratory infection secondary to COVID infection.  - COVID-19 positive on 09/16/2021. Symptoms started on 09/14/2021 to 09/15/2021. He is still having significant cough and secretions, with some blood tinged sputum. I recommended that he " continue his isolation until he is feeling well and is no longer having a productive, certainly not blood-streaked cough. I will obtain a chest x-ray today.    There are no Patient Instructions on file for this visit.    No follow-ups on file.    Ambulatory progress note signed and attested to by Francisco Javier Miller D.O.         Transcribed from ambient dictation for Francisco Javier Miller DO by Era Rosales.  09/24/21   11:26 EDT    I have personally performed the services described in this document as transcribed by the above individual, and it is both accurate and complete.  Francisco Javier Miller DO  9/24/2021  11:27 EDT

## 2021-10-14 ENCOUNTER — OFFICE VISIT (OUTPATIENT)
Dept: FAMILY MEDICINE CLINIC | Facility: CLINIC | Age: 31
End: 2021-10-14

## 2021-10-14 VITALS
BODY MASS INDEX: 27.05 KG/M2 | HEART RATE: 92 BPM | RESPIRATION RATE: 16 BRPM | DIASTOLIC BLOOD PRESSURE: 72 MMHG | TEMPERATURE: 97.3 F | HEIGHT: 69 IN | WEIGHT: 182.6 LBS | SYSTOLIC BLOOD PRESSURE: 132 MMHG | OXYGEN SATURATION: 98 %

## 2021-10-14 DIAGNOSIS — F90.1 ATTENTION DEFICIT HYPERACTIVITY DISORDER (ADHD), PREDOMINANTLY HYPERACTIVE TYPE: Primary | ICD-10-CM

## 2021-10-14 PROCEDURE — 99214 OFFICE O/P EST MOD 30 MIN: CPT | Performed by: FAMILY MEDICINE

## 2021-10-14 NOTE — PROGRESS NOTES
Subjective   Mayo Landers is a 31 y.o. male.     Chief Complaint   Patient presents with   • ADHD       History of Present Illness     Mayo Landers presents today for   Chief Complaint   Patient presents with   • ADHD     Mayo is here today to follow up on ADHD. He has a regular follow-up every 3 months. In 08/2021, he was changed over from Adderall XR 10 mg daily to Vyvanse 30 mg daily due to a formulary change.    He reports that Vyvanse is working well for him, although he will occasionally get distracted and will fidget. He states that he will occasionally not take it due to it it becoming overwhelming. He states that the Vyvanse will last all day, he is ready to go to sleep at night, and he likes the comedown from it. He states that it took him a couple of weeks taking the medication for him to like it, but he is able to help his concentration easier.     He reports that he has always been fidgety. He states that he can not sit down steady or do anything, and he has to stand up. He reports that he is still listening things. He reports that he has been taking Vyvanse for over 1 year. He states that he still gets distracted. He reports that he has fallen asleep on it before the day it helped.    He reports that he has tried quitting nicotine, although he did smoke today, and has been having bloating, pain in his stomach, and he has gained approximately 10 pounds in approximately 1 week. He states that he was smoking 3 percent via an electric smoking device and is only doing it for the oral fixation. He does have nicotine gum at home. He states that he does drink coffee in the morning. He states that he has to utilize the bathroom hourly in the morning until he smokes some.     This patient is accompanied by their self who contributes to the history of their care.    The following portions of the patient's history were reviewed and updated as appropriate: allergies, current medications, past family history,  "past medical history, past social history, past surgical history and problem list.    Active Ambulatory Problems     Diagnosis Date Noted   • Attention deficit hyperactivity disorder (ADHD), predominantly hyperactive type 04/25/2019     Resolved Ambulatory Problems     Diagnosis Date Noted   • No Resolved Ambulatory Problems     Past Medical History:   Diagnosis Date   • Anxiety    • Chronic bronchitis (HCC)    • Depression    • Fatty liver    • Fracture      Past Surgical History:   Procedure Laterality Date   • EAR TUBES       Family History   Problem Relation Age of Onset   • Breast cancer Mother    • Hyperlipidemia Father    • Hypertension Father    • No Known Problems Sister    • Obesity Brother    • Klinefelter's syndrome Brother    • Breast cancer Maternal Grandmother      Social History     Socioeconomic History   • Marital status: Single   Tobacco Use   • Smoking status: Current Every Day Smoker     Packs/day: 0.00     Years: 2.00     Pack years: 0.00     Types: Electronic Cigarette   • Smokeless tobacco: Never Used   Vaping Use   • Vaping Use: Every day   • Substances: Nicotine   • Devices: Disposable   • Passive vaping exposure: Yes   Substance and Sexual Activity   • Alcohol use: Yes     Comment: Ocassionally   • Drug use: No       Review of Systems  See HPI    Objective   Blood pressure 132/72, pulse 92, temperature 97.3 °F (36.3 °C), resp. rate 16, height 175.3 cm (69.02\"), weight 82.8 kg (182 lb 9.6 oz), SpO2 98 %.  Nursing note reviewed  Physical Exam  Const: NAD, A&Ox4, Pleasant, Cooperative  Eyes: EOMI, no conjunctivitis  ENT: No nasal discharge present, neck supple  Cardiac: Regular rate and rhythm, no cyanosis  Resp: Respiratory rate within normal limits, no increased work of breathing, no audible wheezing or retractions noted  GI: No distention or ascites  MSK: Motor and sensation grossly intact in bilateral upper extremities  Neurologic: CN II-XII grossly intact  Psych: Appropriate mood and " behavior.  Skin: Warm, dry  Procedures  Assessment/Plan   Problem List Items Addressed This Visit        Mental Health    Attention deficit hyperactivity disorder (ADHD), predominantly hyperactive type - Primary    Overview     Formulary change from Adderall XR 10mg to Vyvanse 30mg daily in August 2021         Current Assessment & Plan     Psychological condition is unchanged.  Continue current treatment regimen. Doing well on Vyvanse 30mg.  Psychological condition  will be reassessed in 3 months.             1. ADHD  - He is actually doing very well on the Vyvanse 30 mg. He will continue this dosage and follow up every 3 months for routine follow-up. ADHD self-symptom scale was filled out today.    2. Nicotine dependence, currently active  - He depends on nicotine in the form of vape. I advised him that many of his symptoms are related to the nicotine withdrawal and that he should slowly bring his usage down and use nicotine replacement products as needed, but that they should resolve over time.      There are no Patient Instructions on file for this visit.    Return in about 3 months (around 1/14/2022) for Controlled substance 3-month.    Ambulatory progress note signed and attested to by Francisco Javier Miller D.O.         Transcribed from ambient dictation for Francisco Javier Miller DO by Malachi Fritz.  10/14/21   16:59 EDT    I have personally performed the services described in this document as transcribed by the above individual, and it is both accurate and complete.  Francisco Javier Miller DO  10/26/2021  06:59 EDT

## 2021-10-14 NOTE — ASSESSMENT & PLAN NOTE
Psychological condition is unchanged.  Continue current treatment regimen. Doing well on Vyvanse 30mg.  Psychological condition  will be reassessed in 3 months.

## 2021-10-19 DIAGNOSIS — F90.1 ATTENTION DEFICIT HYPERACTIVITY DISORDER (ADHD), PREDOMINANTLY HYPERACTIVE TYPE: ICD-10-CM

## 2021-10-19 NOTE — TELEPHONE ENCOUNTER
Rx Refill Note  Requested Prescriptions     Pending Prescriptions Disp Refills   • lisdexamfetamine (Vyvanse) 30 MG capsule 30 capsule 0     Sig: Take 1 capsule by mouth Every Morning      Last office visit with prescribing clinician: 10/14/2021      Next office visit with prescribing clinician: 1/14/2022            Ashlee Montgomery MA  10/19/21, 13:15 EDT

## 2021-10-19 NOTE — TELEPHONE ENCOUNTER
Caller: Mayo Landers    Relationship: Self    Requested Prescriptions     Pending Prescriptions Disp Refills   • lisdexamfetamine (Vyvanse) 30 MG capsule 30 capsule 0     Sig: Take 1 capsule by mouth Every Morning        Pharmacy where request should be sent:     [facility] [address] [facility] [phone] [facility] [phone] FX          Additional details provided by patient: THIS PRESCRIPTION WAS SENT TO WRONG PHARMACY    Best call back number: [phone]  Does the patient have less than a 3 day supply:  [x] Yes  [] No    Kali Mckee Rep   10/19/21 09:59 EDT

## 2021-11-16 DIAGNOSIS — F90.1 ATTENTION DEFICIT HYPERACTIVITY DISORDER (ADHD), PREDOMINANTLY HYPERACTIVE TYPE: ICD-10-CM

## 2021-11-16 NOTE — TELEPHONE ENCOUNTER
Caller: Mayo Landers    Relationship: Self    Best call back number: [phone]    Requested Prescriptions:   Requested Prescriptions     Pending Prescriptions Disp Refills   • lisdexamfetamine (Vyvanse) 30 MG capsule 30 capsule 0     Sig: Take 1 capsule by mouth Every Morning        Pharmacy where request should be sent: [facility] [address] [facility] [phone] [facility] [phone] FX     Additional details provided by patient: PATIENT HAS TWO TABLETS LEFT    Does the patient have less than a 3 day supply:  [x] Yes  [] No    Kali David Rep   11/16/21 14:39 EST

## 2021-11-16 NOTE — TELEPHONE ENCOUNTER
Rx Refill Note  Requested Prescriptions     Pending Prescriptions Disp Refills   • lisdexamfetamine (Vyvanse) 30 MG capsule 30 capsule 0     Sig: Take 1 capsule by mouth Every Morning      Last office visit with prescribing clinician: 10/14/2021      Next office visit with prescribing clinician: 1/14/2022     Ricarda Martinez MA  11/16/21, 14:47 EST     Last fill: 10/19/2021

## 2021-12-14 DIAGNOSIS — F90.1 ATTENTION DEFICIT HYPERACTIVITY DISORDER (ADHD), PREDOMINANTLY HYPERACTIVE TYPE: ICD-10-CM

## 2021-12-14 NOTE — TELEPHONE ENCOUNTER
Rx Refill Note  Requested Prescriptions     Pending Prescriptions Disp Refills   • lisdexamfetamine (Vyvanse) 30 MG capsule 30 capsule 0     Sig: Take 1 capsule by mouth Every Morning      Last office visit with prescribing clinician: 10/14/2021      Next office visit with prescribing clinician: 1/14/2022   23}  Ricarda Martinez MA  12/14/21, 13:15 EST     Last fill: 11/16/2021

## 2021-12-14 NOTE — TELEPHONE ENCOUNTER
Caller: Mayo Landers    Relationship: Self    Best call back number: [phone]     Requested Prescriptions:   Requested Prescriptions     Pending Prescriptions Disp Refills   • lisdexamfetamine (Vyvanse) 30 MG capsule 30 capsule 0     Sig: Take 1 capsule by mouth Every Morning        Pharmacy where request should be sent: [facility] [address] [facility] [phone] [facility] [phone] FX     Additional details provided by patient: PATIENT HAS ENOUGH UNTIL 12-16-21    Does the patient have less than a 3 day supply:  [x] Yes  [] No    Kali Quinn Rep   12/14/21 13:08 EST

## 2022-01-10 DIAGNOSIS — F90.1 ATTENTION DEFICIT HYPERACTIVITY DISORDER (ADHD), PREDOMINANTLY HYPERACTIVE TYPE: ICD-10-CM

## 2022-01-10 NOTE — TELEPHONE ENCOUNTER
Caller: Mayo Landers    Relationship: Self    Best call back number:[phone]    Requested Prescriptions:   Requested Prescriptions     Pending Prescriptions Disp Refills   • lisdexamfetamine (Vyvanse) 30 MG capsule 30 capsule 0     Sig: Take 1 capsule by mouth Every Morning        Pharmacy where request should be sent: [facility] [address] [facility] [phone] [facility] [phone] FX     Additional details provided by patient: PATIENT HAS 3 OR 4 TABLETS LEFT    Does the patient have less than a 3 day supply:  [] Yes  [x] No    Kali Montelongo Rep   01/10/22 15:50 EST

## 2022-01-10 NOTE — TELEPHONE ENCOUNTER
Rx Refill Note  Requested Prescriptions     Pending Prescriptions Disp Refills   • lisdexamfetamine (Vyvanse) 30 MG capsule 30 capsule 0     Sig: Take 1 capsule by mouth Every Morning      Last office visit with prescribing clinician: 10/14/2021      Next office visit with prescribing clinician: 1/14/2022     Csa:8/19/20  UDS:8/19/20       Kinza Lebron MA  01/10/22, 16:31 EST

## 2022-01-20 ENCOUNTER — OFFICE VISIT (OUTPATIENT)
Dept: FAMILY MEDICINE CLINIC | Facility: CLINIC | Age: 32
End: 2022-01-20

## 2022-01-20 VITALS
HEIGHT: 69 IN | BODY MASS INDEX: 27.85 KG/M2 | HEART RATE: 81 BPM | SYSTOLIC BLOOD PRESSURE: 118 MMHG | OXYGEN SATURATION: 98 % | DIASTOLIC BLOOD PRESSURE: 62 MMHG | WEIGHT: 188 LBS

## 2022-01-20 DIAGNOSIS — F90.1 ATTENTION DEFICIT HYPERACTIVITY DISORDER (ADHD), PREDOMINANTLY HYPERACTIVE TYPE: Primary | ICD-10-CM

## 2022-01-20 DIAGNOSIS — F17.200 NICOTINE DEPENDENCE DUE TO VAPING NON-TOBACCO PRODUCT: ICD-10-CM

## 2022-01-20 PROCEDURE — 99214 OFFICE O/P EST MOD 30 MIN: CPT | Performed by: FAMILY MEDICINE

## 2022-01-20 RX ORDER — NICOTINE 21 MG/24HR
1 PATCH, TRANSDERMAL 24 HOURS TRANSDERMAL EVERY 24 HOURS
Qty: 14 PATCH | Refills: 0 | Status: SHIPPED | OUTPATIENT
Start: 2022-01-20 | End: 2022-02-03

## 2022-01-20 NOTE — PATIENT INSTRUCTIONS
Managing the Challenge of Quitting Smoking  Quitting smoking is a physical and mental challenge. You will face cravings, withdrawal symptoms, and temptation. Before quitting, work with your health care provider to make a plan that can help you manage quitting. Preparation can help you quit and keep you from giving in.  How to manage lifestyle changes  Managing stress  Stress can make you want to smoke, and wanting to smoke may cause stress. It is important to find ways to manage your stress. You might try some of the following:  · Practice relaxation techniques.  ? Breathe slowly and deeply, in through your nose and out through your mouth.  ? Listen to music.  ? Soak in a bath or take a shower.  ? Imagine a peaceful place or vacation.  · Get some support.  ? Talk with family or friends about your stress.  ? Join a support group.  ? Talk with a counselor or therapist.  · Get some physical activity.  ? Go for a walk, run, or bike ride.  ? Play a favorite sport.  ? Practice yoga.    Medicines  Talk with your health care provider about medicines that might help you deal with cravings and make quitting easier for you.  Relationships  Social situations can be difficult when you are quitting smoking. To manage this, you can:  · Avoid parties and other social situations where people might be smoking.  · Avoid alcohol.  · Leave right away if you have the urge to smoke.  · Explain to your family and friends that you are quitting smoking. Ask for support and let them know you might be a bit grumpy.  · Plan activities where smoking is not an option.  General instructions  Be aware that many people gain weight after they quit smoking. However, not everyone does. To keep from gaining weight, have a plan in place before you quit and stick to the plan after you quit. Your plan should include:  · Having healthy snacks. When you have a craving, it may help to:  ? Eat popcorn, carrots, celery, or other cut vegetables.  ? Chew  sugar-free gum.  · Changing how you eat.  ? Eat small portion sizes at meals.  ? Eat 4-6 small meals throughout the day instead of 1-2 large meals a day.  ? Be mindful when you eat. Do not watch television or do other things that might distract you as you eat.  · Exercising regularly.  ? Make time to exercise each day. If you do not have time for a long workout, do short bouts of exercise for 5-10 minutes several times a day.  ? Do some form of strengthening exercise, such as weight lifting.  ? Do some exercise that gets your heart beating and causes you to breathe deeply, such as walking fast, running, swimming, or biking. This is very important.  · Drinking plenty of water or other low-calorie or no-calorie drinks. Drink 6-8 glasses of water daily.    How to recognize withdrawal symptoms  Your body and mind may experience discomfort as you try to get used to not having nicotine in your system. These effects are called withdrawal symptoms. They may include:  · Feeling hungrier than normal.  · Having trouble concentrating.  · Feeling irritable or restless.  · Having trouble sleeping.  · Feeling depressed.  · Craving a cigarette.  To manage withdrawal symptoms:  · Avoid places, people, and activities that trigger your cravings.  · Remember why you want to quit.  · Get plenty of sleep.  · Avoid coffee and other caffeinated drinks. These may worsen some of your symptoms.  These symptoms may surprise you. But be assured that they are normal to have when quitting smoking.  How to manage cravings  Come up with a plan for how to deal with your cravings. The plan should include the following:  · A definition of the specific situation you want to deal with.  · An alternative action you will take.  · A clear idea for how this action will help.  · The name of someone who might help you with this.  Cravings usually last for 5-10 minutes. Consider taking the following actions to help you with your plan to deal with  cravings:  · Keep your mouth busy.  ? Chew sugar-free gum.  ? Suck on hard candies or a straw.  ? Brush your teeth.  · Keep your hands and body busy.  ? Change to a different activity right away.  ? Squeeze or play with a ball.  ? Do an activity or a hobby, such as making bead jewelry, practicing needlepoint, or working with wood.  ? Mix up your normal routine.  ? Take a short exercise break. Go for a quick walk or run up and down stairs.  · Focus on doing something kind or helpful for someone else.  · Call a friend or family member to talk during a craving.  · Join a support group.  · Contact a quitline.  Where to find support  To get help or find a support group:  · Call the National Cancer [place]'s Smoking Quitline: 6-968-QUIT NOW (317-2296)  · Visit the website of the Substance Abuse and Mental Health Services Administration: www.samhsa.gov  · Text QUIT to SmokefreeTXT: 419681  Where to find more information  Visit these websites to find more information on quitting smoking:  · National Cancer [place]: www.smokefree.gov  · American Lung Association: www.lung.org  · American Cancer Society: www.cancer.org  · Centers for Disease Control and Prevention: www.cdc.gov  · American Heart Association: www.heart.org  Contact a health care provider if:  · You want to change your plan for quitting.  · The medicines you are taking are not helping.  · Your eating feels out of control or you cannot sleep.  Get help right away if:  · You feel depressed or become very anxious.  Summary  · Quitting smoking is a physical and mental challenge. You will face cravings, withdrawal symptoms, and temptation to smoke again. Preparation can help you as you go through these challenges.  · Try different techniques to manage stress, handle social situations, and prevent weight gain.  · You can deal with cravings by keeping your mouth busy (such as by chewing gum), keeping your hands and body busy, calling family or friends, or  contacting a quitline for people who want to quit smoking.  · You can deal with withdrawal symptoms by avoiding places where people smoke, getting plenty of rest, and avoiding drinks with caffeine.  This information is not intended to replace advice given to you by your health care provider. Make sure you discuss any questions you have with your health care provider.  Document Revised: 10/06/2020 Document Reviewed: 10/06/2020  Elsevier Patient Education © 2021 [employer]

## 2022-01-20 NOTE — PROGRESS NOTES
Established Patient Office Visit      Patient Name: Mayo Landers  : 1990   MRN: [id number]   Care Team: Patient Care Team:  Francisco Javier Miller DO as PCP - General (Family Medicine)  Provider, No Known    Chief Complaint:    Chief Complaint   Patient presents with   • ADHD     3 mo f/u       History of Present Illness: Myao Landers is a 31 y.o. male who is here today for chief complaint.    HPI    Verbal consent obtained for MARISOL recording.    The patient reports that he is doing well on her medications. He denies any changes in her medications. He denies any side effects from his medications. He reports that he has not taken he medication today and he feels fine. He denies needing a refill today.    The patient would like to know if there is something to help him quit smoking. He reports that he is still vaping, but it has been difficult to quit and that it is more difficult than cigarettes. He states that he use to be able to quit cigarettes and not have one for 2 or 3 years or until he was stressed out. He reports that his intake of nicotine has increased a lot. He states that there are times he can smoke a whole pack. He states that he only vapes and no longer smokes cigarettes. He reports that he is using nicotine gum, which works pretty well for him.  He reports that he has been exercising and he began going to the gym again.    This patient is accompanied by their self who contributes to the history of their care.      This patient is accompanied by their self who contributes to the history of their care.    The following portions of the patient's history were reviewed and updated as appropriate: allergies, current medications, past family history, past medical history, past social history, past surgical history and problem list.    Subjective      Review of Systems:   Review of Systems - See HPI    Past Medical History:   Past Medical History:   Diagnosis Date   • Anxiety    • Chronic bronchitis  (HCC)    • Depression    • Fatty liver    • Fracture        Past Surgical History:   Past Surgical History:   Procedure Laterality Date   • EAR TUBES         Family History:   Family History   Problem Relation Age of Onset   • Breast cancer Mother    • Hyperlipidemia Father    • Hypertension Father    • No Known Problems Sister    • Obesity Brother    • Klinefelter's syndrome Brother    • Breast cancer Maternal Grandmother        Social History:   Social History     Socioeconomic History   • Marital status: Single   Tobacco Use   • Smoking status: Current Every Day Smoker     Packs/day: 0.00     Years: 2.00     Pack years: 0.00     Types: Electronic Cigarette   • Smokeless tobacco: Never Used   Vaping Use   • Vaping Use: Every day   • Substances: Nicotine   • Devices: Disposable   • Passive vaping exposure: Yes   Substance and Sexual Activity   • Alcohol use: Yes     Comment: Ocassionally   • Drug use: No       Tobacco History:   Social History     Tobacco Use   Smoking Status Current Every Day Smoker   • Packs/day: 0.00   • Years: 2.00   • Pack years: 0.00   • Types: Electronic Cigarette   Smokeless Tobacco Never Used       Medications:     Current Outpatient Medications:   •  lisdexamfetamine (Vyvanse) 30 MG capsule, Take 1 capsule by mouth Every Morning, Disp: 30 capsule, Rfl: 0  •  nicotine (Nicoderm CQ) 14 MG/24HR patch, Place 1 patch on the skin as directed by provider Daily for 14 days. Then 7mg patch, Disp: 14 patch, Rfl: 0  •  nicotine (Nicoderm CQ) 21 MG/24HR patch, Place 1 patch on the skin as directed by provider Daily for 14 days. Then 14mg patch, Disp: 14 patch, Rfl: 0  •  nicotine (Nicoderm CQ) 7 MG/24HR patch, Place 1 patch on the skin as directed by provider Daily for 14 days., Disp: 14 patch, Rfl: 0    Allergies:   No Known Allergies    Objective   Objective     Physical Exam:  Vital Signs:   Vitals:    01/20/22 1343   BP: 118/62   Pulse: 81   SpO2: 98%   Weight: 85.3 kg (188 lb)   Height: 175.3 cm  "(69.02\")   PainSc: 0-No pain     Body mass index is 27.75 kg/m².     Physical Exam  Nursing note reviewed  Const: NAD, A&Ox4, Pleasant, Cooperative  Eyes: EOMI, no conjunctivitis  ENT: No nasal discharge present, neck supple  Cardiac: Regular rate and rhythm, no cyanosis  Resp: Respiratory rate within normal limits, no increased work of breathing, no audible wheezing or retractions noted  GI: No distention or ascites  MSK: Motor and sensation grossly intact in bilateral upper extremities  Neurologic: CN II-XII grossly intact  Psych: Appropriate mood and behavior.  Skin: Warm, dry  Procedures/Radiology     Procedures  No radiology results for the last 7 days     Assessment/Plan   Assessment / Plan      Assessment/Plan:   Problems Addressed This Visit  Diagnoses and all orders for this visit:    1. Attention deficit hyperactivity disorder (ADHD), predominantly hyperactive type (Primary)  Assessment & Plan:  Psychological condition is unchanged.  Continue current treatment regimen.  Regular aerobic exercise.  Psychological condition  will be reassessed in 3 months.      2. Nicotine dependence due to vaping non-tobacco product  -     nicotine (Nicoderm CQ) 21 MG/24HR patch; Place 1 patch on the skin as directed by provider Daily for 14 days. Then 14mg patch  Dispense: 14 patch; Refill: 0  -     nicotine (Nicoderm CQ) 14 MG/24HR patch; Place 1 patch on the skin as directed by provider Daily for 14 days. Then 7mg patch  Dispense: 14 patch; Refill: 0  -     nicotine (Nicoderm CQ) 7 MG/24HR patch; Place 1 patch on the skin as directed by provider Daily for 14 days.  Dispense: 14 patch; Refill: 0      Problem List Items Addressed This Visit        Mental Health    Attention deficit hyperactivity disorder (ADHD), predominantly hyperactive type - Primary    Overview     Formulary change from Adderall XR 10mg to Vyvanse 30mg daily in August 2021         Current Assessment & Plan     Psychological condition is " unchanged.  Continue current treatment regimen.  Regular aerobic exercise.  Psychological condition  will be reassessed in 3 months.         Relevant Medications    nicotine (Nicoderm CQ) 21 MG/24HR patch    nicotine (Nicoderm CQ) 14 MG/24HR patch    nicotine (Nicoderm CQ) 7 MG/24HR patch      Other Visit Diagnoses     Nicotine dependence due to vaping non-tobacco product        Relevant Medications    nicotine (Nicoderm CQ) 21 MG/24HR patch    nicotine (Nicoderm CQ) 14 MG/24HR patch    nicotine (Nicoderm CQ) 7 MG/24HR patch          1. Smoking cessation  - I will start him on a nicotine path 21 mg and he will use this for 2 weeks, then it will decrease to the 14 mg, and then the 7 mg. I advised the patient to not to smoke no more than the equivalent of 1 cigarette with the patch on.    Patient Instructions   Managing the Challenge of Quitting Smoking  Quitting smoking is a physical and mental challenge. You will face cravings, withdrawal symptoms, and temptation. Before quitting, work with your health care provider to make a plan that can help you manage quitting. Preparation can help you quit and keep you from giving in.  How to manage lifestyle changes  Managing stress  Stress can make you want to smoke, and wanting to smoke may cause stress. It is important to find ways to manage your stress. You might try some of the following:  · Practice relaxation techniques.  ? Breathe slowly and deeply, in through your nose and out through your mouth.  ? Listen to music.  ? Soak in a bath or take a shower.  ? Imagine a peaceful place or vacation.  · Get some support.  ? Talk with family or friends about your stress.  ? Join a support group.  ? Talk with a counselor or therapist.  · Get some physical activity.  ? Go for a walk, run, or bike ride.  ? Play a favorite sport.  ? Practice yoga.    Medicines  Talk with your health care provider about medicines that might help you deal with cravings and make quitting easier for  you.  Relationships  Social situations can be difficult when you are quitting smoking. To manage this, you can:  · Avoid parties and other social situations where people might be smoking.  · Avoid alcohol.  · Leave right away if you have the urge to smoke.  · Explain to your family and friends that you are quitting smoking. Ask for support and let them know you might be a bit grumpy.  · Plan activities where smoking is not an option.  General instructions  Be aware that many people gain weight after they quit smoking. However, not everyone does. To keep from gaining weight, have a plan in place before you quit and stick to the plan after you quit. Your plan should include:  · Having healthy snacks. When you have a craving, it may help to:  ? Eat popcorn, carrots, celery, or other cut vegetables.  ? Chew sugar-free gum.  · Changing how you eat.  ? Eat small portion sizes at meals.  ? Eat 4-6 small meals throughout the day instead of 1-2 large meals a day.  ? Be mindful when you eat. Do not watch television or do other things that might distract you as you eat.  · Exercising regularly.  ? Make time to exercise each day. If you do not have time for a long workout, do short bouts of exercise for 5-10 minutes several times a day.  ? Do some form of strengthening exercise, such as weight lifting.  ? Do some exercise that gets your heart beating and causes you to breathe deeply, such as walking fast, running, swimming, or biking. This is very important.  · Drinking plenty of water or other low-calorie or no-calorie drinks. Drink 6-8 glasses of water daily.    How to recognize withdrawal symptoms  Your body and mind may experience discomfort as you try to get used to not having nicotine in your system. These effects are called withdrawal symptoms. They may include:  · Feeling hungrier than normal.  · Having trouble concentrating.  · Feeling irritable or restless.  · Having trouble sleeping.  · Feeling depressed.  · Craving  a cigarette.  To manage withdrawal symptoms:  · Avoid places, people, and activities that trigger your cravings.  · Remember why you want to quit.  · Get plenty of sleep.  · Avoid coffee and other caffeinated drinks. These may worsen some of your symptoms.  These symptoms may surprise you. But be assured that they are normal to have when quitting smoking.  How to manage cravings  Come up with a plan for how to deal with your cravings. The plan should include the following:  · A definition of the specific situation you want to deal with.  · An alternative action you will take.  · A clear idea for how this action will help.  · The name of someone who might help you with this.  Cravings usually last for 5-10 minutes. Consider taking the following actions to help you with your plan to deal with cravings:  · Keep your mouth busy.  ? Chew sugar-free gum.  ? Suck on hard candies or a straw.  ? Brush your teeth.  · Keep your hands and body busy.  ? Change to a different activity right away.  ? Squeeze or play with a ball.  ? Do an activity or a hobby, such as making bead jewelry, practicing needlepoint, or working with wood.  ? Mix up your normal routine.  ? Take a short exercise break. Go for a quick walk or run up and down stairs.  · Focus on doing something kind or helpful for someone else.  · Call a friend or family member to talk during a craving.  · Join a support group.  · Contact a quitline.  Where to find support  To get help or find a support group:  · Call the National [employer] [place] Smoking Quitline: 9-786-QUIT NOW (673-1315)  · Visit the website of the Substance Abuse and Mental Health Services Administration: www.samhsa.gov  · Text QUIT to SmokefreeTXT: 283164  Where to find more information  Visit these websites to find more information on quitting smoking:  · National Cancer [place]: www.smokefree.gov  · American Lung Association: www.lung.org  · American Cancer Society: www.cancer.org  · Centers for  Disease Control and Prevention: www.cdc.gov  · American Heart Association: www.heart.org  Contact a health care provider if:  · You want to change your plan for quitting.  · The medicines you are taking are not helping.  · Your eating feels out of control or you cannot sleep.  Get help right away if:  · You feel depressed or become very anxious.  Summary  · Quitting smoking is a physical and mental challenge. You will face cravings, withdrawal symptoms, and temptation to smoke again. Preparation can help you as you go through these challenges.  · Try different techniques to manage stress, handle social situations, and prevent weight gain.  · You can deal with cravings by keeping your mouth busy (such as by chewing gum), keeping your hands and body busy, calling family or friends, or contacting a quitline for people who want to quit smoking.  · You can deal with withdrawal symptoms by avoiding places where people smoke, getting plenty of rest, and avoiding drinks with caffeine.  This information is not intended to replace advice given to you by your health care provider. Make sure you discuss any questions you have with your health care provider.  Document Revised: 10/06/2020 Document Reviewed: 10/06/2020  [employer] Patient Education © 2021 [employer]        Follow Up:   Return in about 3 months (around 4/20/2022).      DO CHRISTIANO HernandezE [address]  [facility]  [address] [phone]  Fax [phone]  Phone [phone]     Transcribed from ambient dictation for Francisco Javier Miller DO by Valeria Mullins.  01/20/22   14:32 EST    Patient verbalized consent to the visit recording.  I have personally performed the services described in this document as transcribed by the above individual, and it is both accurate and complete.  Francisco Javier Miller DO  1/21/2022  16:22 EST

## 2022-02-07 DIAGNOSIS — F90.1 ATTENTION DEFICIT HYPERACTIVITY DISORDER (ADHD), PREDOMINANTLY HYPERACTIVE TYPE: ICD-10-CM

## 2022-02-07 NOTE — TELEPHONE ENCOUNTER
Rx Refill Note  Requested Prescriptions     Pending Prescriptions Disp Refills   • lisdexamfetamine (Vyvanse) 30 MG capsule 30 capsule 0     Sig: Take 1 capsule by mouth Every Morning      Last office visit with prescribing clinician: 1/20/2022      Next office visit with prescribing clinician: 4/20/2022     Csa:8/19/20  Uds:7/14/21       Kinza Lebron MA  02/07/22, 15:29 EST

## 2022-02-07 NOTE — TELEPHONE ENCOUNTER
Caller: Mayo Landers    Relationship: Self    Best call back number: [phone]    Requested Prescriptions:   Requested Prescriptions     Pending Prescriptions Disp Refills   • lisdexamfetamine (Vyvanse) 30 MG capsule 30 capsule 0     Sig: Take 1 capsule by mouth Every Morning       NICOTINE PATCHES      Pharmacy where request should be sent: [facility] [address] [facility] [address] [facility] [phone] [facility] [phone] FX     Additional details provided by patient: 2 DOSES LEFT    Does the patient have less than a 3 day supply:  [x] Yes  [] No    Bianca Narayanan, PCT   02/07/22 13:35 EST

## 2022-02-09 NOTE — TELEPHONE ENCOUNTER
Attempted to contact patient, no answer. Left voicemail for patient to call the office back (office # given).     Hub may relay message and schedule appointment.    Jeane Rainey MD 1 hour ago (12:14 PM)        CSA is out of date.  I would write a 1 week of medications.

## 2022-02-14 ENCOUNTER — OFFICE VISIT (OUTPATIENT)
Dept: FAMILY MEDICINE CLINIC | Facility: CLINIC | Age: 32
End: 2022-02-14

## 2022-02-14 VITALS
SYSTOLIC BLOOD PRESSURE: 120 MMHG | HEART RATE: 95 BPM | TEMPERATURE: 98.2 F | WEIGHT: 187 LBS | DIASTOLIC BLOOD PRESSURE: 76 MMHG | HEIGHT: 69 IN | OXYGEN SATURATION: 98 % | BODY MASS INDEX: 27.7 KG/M2

## 2022-02-14 DIAGNOSIS — Z72.0 TOBACCO ABUSE: ICD-10-CM

## 2022-02-14 DIAGNOSIS — F90.1 ATTENTION DEFICIT HYPERACTIVITY DISORDER (ADHD), PREDOMINANTLY HYPERACTIVE TYPE: Primary | ICD-10-CM

## 2022-02-14 DIAGNOSIS — F90.1 ATTENTION DEFICIT HYPERACTIVITY DISORDER (ADHD), PREDOMINANTLY HYPERACTIVE TYPE: ICD-10-CM

## 2022-02-14 DIAGNOSIS — F17.200 NICOTINE DEPENDENCE DUE TO VAPING NON-TOBACCO PRODUCT: ICD-10-CM

## 2022-02-14 DIAGNOSIS — Z23 NEED FOR IMMUNIZATION AGAINST INFLUENZA: ICD-10-CM

## 2022-02-14 DIAGNOSIS — Z23 NEED FOR COVID-19 VACCINE: ICD-10-CM

## 2022-02-14 PROCEDURE — 99213 OFFICE O/P EST LOW 20 MIN: CPT | Performed by: PHYSICIAN ASSISTANT

## 2022-02-14 PROCEDURE — 90471 IMMUNIZATION ADMIN: CPT | Performed by: PHYSICIAN ASSISTANT

## 2022-02-14 PROCEDURE — 91300 COVID-19 (PFIZER): CPT | Performed by: PHYSICIAN ASSISTANT

## 2022-02-14 PROCEDURE — 90686 IIV4 VACC NO PRSV 0.5 ML IM: CPT | Performed by: PHYSICIAN ASSISTANT

## 2022-02-14 PROCEDURE — 0001A COVID-19 (PFIZER): CPT | Performed by: PHYSICIAN ASSISTANT

## 2022-02-14 RX ORDER — NICOTINE 21 MG/24HR
1 PATCH, TRANSDERMAL 24 HOURS TRANSDERMAL EVERY 24 HOURS
Qty: 28 EACH | Refills: 0 | Status: SHIPPED | OUTPATIENT
Start: 2022-02-14 | End: 2022-04-25 | Stop reason: SDUPTHER

## 2022-02-14 RX ORDER — NICOTINE 21 MG/24HR
1 PATCH, TRANSDERMAL 24 HOURS TRANSDERMAL EVERY 24 HOURS
Qty: 28 EACH | Refills: 0 | Status: SHIPPED | OUTPATIENT
Start: 2022-02-14 | End: 2022-04-11 | Stop reason: SDUPTHER

## 2022-02-14 NOTE — PROGRESS NOTES
Chief Complaint   Patient presents with   • ADHD     f/u, CSA update   • Nicotine Dependence     Pt would like to discuss starting nicotine patches.       HPI     Mayo Landers is a pleasant 31 y.o. male who is here for routine of ADD.  Patient is currently taking Vyvanse 30 mg daily.  He reports focus and attention.  Denies any adverse effect unless he takes vyvanse late in the day and it may cause mild insomnia.  No weight loss or appetite issues.  Patient wants to quit vaping.  He never received nicotine patches at pharmacy.  He requests Covid and Flu vaccine today.    Past Medical History:   Diagnosis Date   • Anxiety    • Chronic bronchitis (HCC)    • Depression    • Fatty liver    • Fracture        Past Surgical History:   Procedure Laterality Date   • EAR TUBES         Family History   Problem Relation Age of Onset   • Breast cancer Mother    • Hyperlipidemia Father    • Hypertension Father    • No Known Problems Sister    • Obesity Brother    • Klinefelter's syndrome Brother    • Breast cancer Maternal Grandmother        Social History     Socioeconomic History   • Marital status: Single   Tobacco Use   • Smoking status: Current Every Day Smoker     Packs/day: 0.00     Years: 2.00     Pack years: 0.00     Types: Electronic Cigarette   • Smokeless tobacco: Never Used   Vaping Use   • Vaping Use: Every day   • Substances: Nicotine   • Devices: Disposable   • Passive vaping exposure: Yes   Substance and Sexual Activity   • Alcohol use: Yes     Comment: Ocassionally   • Drug use: No       No Known Allergies    ROS  Review of Systems   Constitutional: Negative for chills and fever.   Respiratory: Negative for cough.    Cardiovascular: Negative for chest pain.   Neurological: Negative for dizziness and headache.   Psychiatric/Behavioral: Positive for decreased concentration. Negative for sleep disturbance.       Vitals:    02/14/22 1105   BP: 120/76   Pulse: 95   Temp: 98.2 °F (36.8 °C)   SpO2: 98%   Weight: 84.8  "kg (187 lb)   Height: 175.3 cm (69.02\")   PainSc: 0-No pain     Body mass index is 27.6 kg/m².    Current Outpatient Medications on File Prior to Visit   Medication Sig Dispense Refill   • lisdexamfetamine (Vyvanse) 30 MG capsule Take 1 capsule by mouth Every Morning 7 capsule 0     No current facility-administered medications on file prior to visit.       Results for orders placed or performed in visit on 07/14/21   Compliance Drug Analysis, Ur - Urine, Clean Catch    Specimen: Urine, Clean Catch   Result Value Ref Range    Report Summary FINAL        PE    Physical Exam  Vitals reviewed.   Constitutional:       General: He is not in acute distress.     Appearance: Normal appearance. He is well-developed and normal weight. He is not ill-appearing or diaphoretic.   HENT:      Head: Normocephalic and atraumatic.   Eyes:      Extraocular Movements: Extraocular movements intact.      Conjunctiva/sclera: Conjunctivae normal.   Pulmonary:      Effort: No respiratory distress.   Musculoskeletal:         General: Normal range of motion.      Cervical back: Normal range of motion.   Neurological:      General: No focal deficit present.      Mental Status: He is alert.   Psychiatric:         Attention and Perception: He is attentive.         Mood and Affect: Mood normal.         Speech: Speech normal.         Behavior: Behavior normal. Behavior is cooperative.         Thought Content: Thought content normal.         Judgment: Judgment normal.         A/P    Diagnoses and all orders for this visit:    1. Attention deficit hyperactivity disorder (ADHD), predominantly hyperactive type (Primary)  UDS completed in July shows THC.  Patient states Dr. Miller is aware.  Will update [facility] and mayela today.  Taking Vyvanse 30 mg daily and getting good benefit with attention and focus.  Denies any adverse effects.  Needs refill, will send to Dr. Miller for approval.    2. Nicotine dependence due to vaping non-tobacco product  3. Tobacco " abuse  -     nicotine (Nicoderm CQ) 21 MG/24HR patch; Place 1 patch on the skin as directed by provider Daily.  Dispense: 28 each; Refill: 0  -     nicotine (Nicoderm CQ) 14 MG/24HR patch; Place 1 patch on the skin as directed by provider Daily.  Dispense: 28 each; Refill: 0  -     nicotine (Nicoderm CQ) 7 MG/24HR patch; Place 1 patch on the skin as directed by provider Daily.  Dispense: 14 each; Refill: 0  Still vaping daily within 30 minutes of vaping.  Did not receive prescription for nicoderm patches that were prescribed at last appointment, will resend to pharmacy.    4. Need for immunization against influenza  -     Flulaval/Fluarix/Fluzone >6 Months ([id number])    5. Need for COVID-19 vaccine  -     COVID-19 Vaccine (Pfizer) Purple Cap         Plan of care reviewed with patient at the conclusion of today's visit. Education was provided regarding diagnosis, management and any prescribed or recommended OTC medications.  Patient verbalizes understanding of and agreement with management plan.    Return in about 3 months (around 5/14/2022) for Recheck, CM.     Deneen Johnson PA-C

## 2022-03-14 DIAGNOSIS — F90.1 ATTENTION DEFICIT HYPERACTIVITY DISORDER (ADHD), PREDOMINANTLY HYPERACTIVE TYPE: ICD-10-CM

## 2022-03-14 NOTE — TELEPHONE ENCOUNTER
Rx Refill Note  Requested Prescriptions     Pending Prescriptions Disp Refills   • lisdexamfetamine (Vyvanse) 30 MG capsule 30 capsule 0     Sig: Take 1 capsule by mouth Every Morning      Last office visit with prescribing clinician: 2-14-22      Next office visit with prescribing clinician: 4/20/2022     louie 7-14-21  cheryl 2-14-22       Ivonne Mcintyre MA  03/14/22, 09:38 EDT

## 2022-03-14 NOTE — TELEPHONE ENCOUNTER
Caller: Mayo Landers    Relationship: Self    Best call back number: [phone]    Requested Prescriptions:   Requested Prescriptions     Pending Prescriptions Disp Refills   • lisdexamfetamine (Vyvanse) 30 MG capsule 30 capsule 0     Sig: Take 1 capsule by mouth Every Morning        Pharmacy where request should be sent: [facility] [address] & [address] O [facility] [phone] [facility] [phone] FX     Additional details provided by patient: 2 DOSES LEFT    Does the patient have less than a 3 day supply:  [x] Yes  [] No    Bianca Narayanan, PCT   03/14/22 09:20 EDT

## 2022-03-23 ENCOUNTER — TELEPHONE (OUTPATIENT)
Dept: FAMILY MEDICINE CLINIC | Facility: CLINIC | Age: 32
End: 2022-03-23

## 2022-03-23 NOTE — TELEPHONE ENCOUNTER
Contacted pharmacy to inquire which Nicoderm dosages was dispensed last. He is currently on 7mg patched. They have the 14 mg dose on file and will have to order these and should have prepared for patient by tomorrow    Attempted to contact patient no answer. Left message notifying him to contact pharmacy to check status of next fill of higher dose nicotine patch

## 2022-03-23 NOTE — TELEPHONE ENCOUNTER
Caller: Mayo Landers    Relationship: Self    Best call back number: [phone]    Requested Prescriptions: nicotine (Nicoderm CQ) 21 MG/24HR patch       Pharmacy where request should be sent: [facility] [address] & [address] [facility] [phone] [facility] [phone] FX     Additional details provided by patient: PATIENT CALLED AND IS REQUESTING THAT HE BE INCREASED FOR HIS NICODERM PATCHES; PATIENT FEEL HE WAS NOT READY TO REDUCE MG YET    Does the patient have less than a 3 day supply:  [] Yes  [x] No    Kali Villasenor Rep   03/23/22 15:13 EDT

## 2022-04-11 DIAGNOSIS — F17.200 NICOTINE DEPENDENCE DUE TO VAPING NON-TOBACCO PRODUCT: ICD-10-CM

## 2022-04-11 DIAGNOSIS — F90.1 ATTENTION DEFICIT HYPERACTIVITY DISORDER (ADHD), PREDOMINANTLY HYPERACTIVE TYPE: ICD-10-CM

## 2022-04-11 DIAGNOSIS — Z72.0 TOBACCO ABUSE: ICD-10-CM

## 2022-04-11 NOTE — TELEPHONE ENCOUNTER
Caller: Mayo Landers    Relationship: Self    Best call back number: [phone]    Requested Prescriptions:   Requested Prescriptions     Pending Prescriptions Disp Refills   • lisdexamfetamine (Vyvanse) 30 MG capsule 30 capsule 0     Sig: Take 1 capsule by mouth Every Morning   • nicotine (Nicoderm CQ) 21 MG/24HR patch 28 each 0     Sig: Place 1 patch on the skin as directed by provider Daily.        [facility] [address] [facility] [phone] [facility] [phone] FX     Additional details provided by patient: PATIENT IS ASKING FOR THE 21MG NICOTINE PATCH    Does the patient have less than a 3 day supply:  [x] Yes  [] No    Bianca Narayanan, PCT   04/11/22 15:38 EDT

## 2022-04-11 NOTE — TELEPHONE ENCOUNTER
Rx Refill Note  Requested Prescriptions     Pending Prescriptions Disp Refills   • lisdexamfetamine (Vyvanse) 30 MG capsule 30 capsule 0     Sig: Take 1 capsule by mouth Every Morning   • nicotine (Nicoderm CQ) 21 MG/24HR patch 28 each 0     Sig: Place 1 patch on the skin as directed by provider Daily.      Last office visit with prescribing clinician: 1/20/2022      Next office visit with prescribing clinician: 4/20/2022            Chikis Patel MA  04/11/22, 15:44 EDT

## 2022-04-12 RX ORDER — NICOTINE 21 MG/24HR
1 PATCH, TRANSDERMAL 24 HOURS TRANSDERMAL EVERY 24 HOURS
Qty: 28 EACH | Refills: 0 | Status: SHIPPED | OUTPATIENT
Start: 2022-04-12 | End: 2022-04-25 | Stop reason: SDUPTHER

## 2022-04-25 ENCOUNTER — OFFICE VISIT (OUTPATIENT)
Dept: FAMILY MEDICINE CLINIC | Facility: CLINIC | Age: 32
End: 2022-04-25

## 2022-04-25 VITALS
SYSTOLIC BLOOD PRESSURE: 130 MMHG | DIASTOLIC BLOOD PRESSURE: 76 MMHG | RESPIRATION RATE: 18 BRPM | HEIGHT: 69 IN | WEIGHT: 181 LBS | HEART RATE: 96 BPM | BODY MASS INDEX: 26.81 KG/M2 | OXYGEN SATURATION: 99 %

## 2022-04-25 DIAGNOSIS — F90.1 ATTENTION DEFICIT HYPERACTIVITY DISORDER (ADHD), PREDOMINANTLY HYPERACTIVE TYPE: Primary | ICD-10-CM

## 2022-04-25 DIAGNOSIS — F17.200 NICOTINE DEPENDENCE DUE TO VAPING NON-TOBACCO PRODUCT: ICD-10-CM

## 2022-04-25 DIAGNOSIS — Z72.0 TOBACCO ABUSE: ICD-10-CM

## 2022-04-25 PROCEDURE — 99214 OFFICE O/P EST MOD 30 MIN: CPT | Performed by: FAMILY MEDICINE

## 2022-04-25 RX ORDER — NICOTINE 21 MG/24HR
1 PATCH, TRANSDERMAL 24 HOURS TRANSDERMAL EVERY 24 HOURS
Qty: 28 EACH | Refills: 0 | Status: SHIPPED | OUTPATIENT
Start: 2022-04-25 | End: 2022-07-25

## 2022-04-25 RX ORDER — NICOTINE 21 MG/24HR
1 PATCH, TRANSDERMAL 24 HOURS TRANSDERMAL EVERY 24 HOURS
Qty: 28 EACH | Refills: 0 | Status: SHIPPED | OUTPATIENT
Start: 2022-04-25 | End: 2022-10-03 | Stop reason: SDUPTHER

## 2022-05-09 DIAGNOSIS — F90.1 ATTENTION DEFICIT HYPERACTIVITY DISORDER (ADHD), PREDOMINANTLY HYPERACTIVE TYPE: ICD-10-CM

## 2022-05-09 NOTE — TELEPHONE ENCOUNTER
Rx Refill Note  Requested Prescriptions     Pending Prescriptions Disp Refills   • lisdexamfetamine (Vyvanse) 30 MG capsule 30 capsule 0     Sig: Take 1 capsule by mouth Every Morning      Last office visit with prescribing clinician: 4/25/2022      Next office visit with prescribing clinician: 7/25/2022     Csa:2/14/22  Uds:7/14/21 needs updated       Kinza Lebron MA  05/09/22, 15:42 EDT

## 2022-05-09 NOTE — TELEPHONE ENCOUNTER
Caller: Mayo Landers    Relationship: Self    Best call back number: [phone]    Requested Prescriptions:   Requested Prescriptions     Pending Prescriptions Disp Refills   • lisdexamfetamine (Vyvanse) 30 MG capsule 30 capsule 0     Sig: Take 1 capsule by mouth Every Morning        Pharmacy where request should be sent: [facility] [address] & [address] [facility] [phone] [facility] [phone] FX     Additional details provided by patient: PATIENT HAS 2 PILLS LEFT.      Does the patient have less than a 3 day supply:  [x] Yes  [] No    Maya Givens   05/09/22 15:40 EDT

## 2022-06-07 DIAGNOSIS — F90.1 ATTENTION DEFICIT HYPERACTIVITY DISORDER (ADHD), PREDOMINANTLY HYPERACTIVE TYPE: ICD-10-CM

## 2022-06-07 NOTE — TELEPHONE ENCOUNTER
Rx Refill Note  Requested Prescriptions     Pending Prescriptions Disp Refills   • lisdexamfetamine (Vyvanse) 30 MG capsule 30 capsule 0     Sig: Take 1 capsule by mouth Every Morning      Last office visit with prescribing clinician: 4/25/2022      Next office visit with prescribing clinician: 7/25/2022            Chikis Patel MA  06/07/22, 09:37 EDT     CSA: 2-14-22  UDS: 7-14-21

## 2022-06-07 NOTE — TELEPHONE ENCOUNTER
Caller: Mayo Landers    Relationship: Self    Best call back number: [phone]    Requested Prescriptions:   Requested Prescriptions     Pending Prescriptions Disp Refills   • lisdexamfetamine (Vyvanse) 30 MG capsule 30 capsule 0     Sig: Take 1 capsule by mouth Every Morning        Pharmacy where request should be sent: [facility] [address] & [address] O [facility] [phone] [facility] [phone] FX     Additional details provided by patient:  PATIENT HAS 3 MORE PILLS    Does the patient have less than a 3 day supply:  [] Yes  [x] No    Maya Givens   06/07/22 09:12 EDT

## 2022-07-06 DIAGNOSIS — F90.1 ATTENTION DEFICIT HYPERACTIVITY DISORDER (ADHD), PREDOMINANTLY HYPERACTIVE TYPE: ICD-10-CM

## 2022-07-06 NOTE — TELEPHONE ENCOUNTER
Rx Refill Note  Requested Prescriptions     Pending Prescriptions Disp Refills   • lisdexamfetamine (Vyvanse) 30 MG capsule 30 capsule 0     Sig: Take 1 capsule by mouth Every Morning      Last office visit with prescribing clinician: 4/25/2022      Next office visit with prescribing clinician: 7/25/2022            Deirdre Nelson MA  07/06/22, 12:19 EDT

## 2022-07-06 NOTE — TELEPHONE ENCOUNTER
Caller: Mayo Landers    Relationship: Self    Best call back number: [phone]    Requested Prescriptions:   Requested Prescriptions     Pending Prescriptions Disp Refills   • lisdexamfetamine (Vyvanse) 30 MG capsule 30 capsule 0     Sig: Take 1 capsule by mouth Every Morning        Pharmacy where request should be sent: [facility] [address] & [address] O [facility] [phone] [facility] [phone] FX     Additional details provided by patient    Does the patient have less than a 3 day supply:  [x] Yes  [] No    Kali Simmons   07/06/22 12:15 EDT

## 2022-07-25 ENCOUNTER — OFFICE VISIT (OUTPATIENT)
Dept: FAMILY MEDICINE CLINIC | Facility: CLINIC | Age: 32
End: 2022-07-25

## 2022-07-25 VITALS
OXYGEN SATURATION: 97 % | DIASTOLIC BLOOD PRESSURE: 72 MMHG | BODY MASS INDEX: 27.28 KG/M2 | RESPIRATION RATE: 16 BRPM | HEART RATE: 93 BPM | WEIGHT: 184.2 LBS | HEIGHT: 69 IN | SYSTOLIC BLOOD PRESSURE: 112 MMHG

## 2022-07-25 DIAGNOSIS — Z51.81 THERAPEUTIC DRUG MONITORING: ICD-10-CM

## 2022-07-25 DIAGNOSIS — F90.1 ATTENTION DEFICIT HYPERACTIVITY DISORDER (ADHD), PREDOMINANTLY HYPERACTIVE TYPE: ICD-10-CM

## 2022-07-25 DIAGNOSIS — F90.1 ATTENTION DEFICIT HYPERACTIVITY DISORDER (ADHD), PREDOMINANTLY HYPERACTIVE TYPE: Primary | ICD-10-CM

## 2022-07-25 PROCEDURE — 99213 OFFICE O/P EST LOW 20 MIN: CPT | Performed by: FAMILY MEDICINE

## 2022-07-29 LAB
DRUGS UR: NORMAL
Lab: NORMAL

## 2022-08-05 DIAGNOSIS — F90.1 ATTENTION DEFICIT HYPERACTIVITY DISORDER (ADHD), PREDOMINANTLY HYPERACTIVE TYPE: ICD-10-CM

## 2022-08-05 NOTE — TELEPHONE ENCOUNTER
Rx Refill Note  Requested Prescriptions     Pending Prescriptions Disp Refills   • lisdexamfetamine (Vyvanse) 30 MG capsule 30 capsule 0     Sig: Take 1 capsule by mouth Every Morning      Last office visit with prescribing clinician: 7/25/2022      Next office visit with prescribing clinician: 10/25/2022            Chikis Patel MA  08/05/22, 16:36 EDT

## 2022-09-06 ENCOUNTER — TELEPHONE (OUTPATIENT)
Dept: FAMILY MEDICINE CLINIC | Facility: CLINIC | Age: 32
End: 2022-09-06

## 2022-09-06 DIAGNOSIS — F90.1 ATTENTION DEFICIT HYPERACTIVITY DISORDER (ADHD), PREDOMINANTLY HYPERACTIVE TYPE: ICD-10-CM

## 2022-09-06 NOTE — TELEPHONE ENCOUNTER
Rx Refill Note  Requested Prescriptions     Pending Prescriptions Disp Refills   • lisdexamfetamine (Vyvanse) 30 MG capsule 30 capsule 0     Sig: Take 1 capsule by mouth Every Morning      Last office visit with prescribing clinician: 7/25/2022      Next office visit with prescribing clinician: 10/25/2022 3}  Ricarda Martinez MA  09/06/22, 10:51 EDT     Last fill: 08/07/2022  UDS:Up to date  CSA:Up to date

## 2022-09-06 NOTE — TELEPHONE ENCOUNTER
Caller: Mayo Landers    Relationship: Self    Best call back number:[phone]    Requested Prescriptions:   Requested Prescriptions     Pending Prescriptions Disp Refills   • lisdexamfetamine (Vyvanse) 30 MG capsule 30 capsule 0     Sig: Take 1 capsule by mouth Every Morning        Pharmacy where request should be sent: [facility] [address] [facility] [phone] [facility] [phone] FX     Additional details provided by patient: PATIENT HAS 1 DAY SUPPLY LEFT    Does the patient have less than a 3 day supply:  [x] Yes  [] No    Kali Bond Rep   09/06/22 10:34 EDT

## 2022-09-06 NOTE — TELEPHONE ENCOUNTER
Attempted to contact pt, no answer. LVM informing pttthat prescription has been sent to his pharmacy & if he had any additional questions to give the office a call (office # given).

## 2022-10-03 DIAGNOSIS — F90.1 ATTENTION DEFICIT HYPERACTIVITY DISORDER (ADHD), PREDOMINANTLY HYPERACTIVE TYPE: ICD-10-CM

## 2022-10-03 DIAGNOSIS — Z72.0 TOBACCO ABUSE: ICD-10-CM

## 2022-10-03 DIAGNOSIS — F17.200 NICOTINE DEPENDENCE DUE TO VAPING NON-TOBACCO PRODUCT: ICD-10-CM

## 2022-10-03 NOTE — TELEPHONE ENCOUNTER
Caller: Mayo Landers    Relationship: Self    Best call back number: [phone]    Requested Prescriptions:   Requested Prescriptions     Pending Prescriptions Disp Refills   • lisdexamfetamine (Vyvanse) 30 MG capsule 30 capsule 0     Sig: Take 1 capsule by mouth Every Morning   • nicotine (Nicoderm CQ) 14 MG/24HR patch 28 each 0     Sig: Place 1 patch on the skin as directed by provider Daily. Start after 21 finishes        Pharmacy where request should be sent: [facility] [id number] [address] & [facility] [phone] [facility] [phone] FX     Additional details provided by patient: PATIENT STATES THAT HE HAS TWO DAYS LEFT.    Does the patient have less than a 3 day supply:  [x] Yes  [] No    Kali Valencia Rep   10/03/22 13:43 EDT

## 2022-10-03 NOTE — TELEPHONE ENCOUNTER
Rx Refill Note  Requested Prescriptions     Pending Prescriptions Disp Refills   • lisdexamfetamine (Vyvanse) 30 MG capsule 30 capsule 0     Sig: Take 1 capsule by mouth Every Morning   • nicotine (Nicoderm CQ) 14 MG/24HR patch 28 each 0     Sig: Place 1 patch on the skin as directed by provider Daily. Start after 21 finishes      Last office visit with prescribing clinician: 7/25/2022      Next office visit with prescribing clinician: 10/25/2022            Ashlee Montgomery MA  10/03/22, 14:43 EDT     CSA 2/14/22  UDS 7/25/22

## 2022-10-04 RX ORDER — NICOTINE 21 MG/24HR
1 PATCH, TRANSDERMAL 24 HOURS TRANSDERMAL EVERY 24 HOURS
Qty: 28 EACH | Refills: 0 | Status: SHIPPED | OUTPATIENT
Start: 2022-10-04 | End: 2022-11-29 | Stop reason: SDUPTHER

## 2022-10-25 ENCOUNTER — OFFICE VISIT (OUTPATIENT)
Dept: FAMILY MEDICINE CLINIC | Facility: CLINIC | Age: 32
End: 2022-10-25

## 2022-10-25 VITALS
DIASTOLIC BLOOD PRESSURE: 70 MMHG | HEART RATE: 118 BPM | BODY MASS INDEX: 26.78 KG/M2 | OXYGEN SATURATION: 98 % | WEIGHT: 181.4 LBS | TEMPERATURE: 98 F | SYSTOLIC BLOOD PRESSURE: 108 MMHG

## 2022-10-25 DIAGNOSIS — F90.1 ATTENTION DEFICIT HYPERACTIVITY DISORDER (ADHD), PREDOMINANTLY HYPERACTIVE TYPE: ICD-10-CM

## 2022-10-25 DIAGNOSIS — Z23 INFLUENZA VACCINE NEEDED: Primary | ICD-10-CM

## 2022-10-25 DIAGNOSIS — F17.200 NICOTINE DEPENDENCE DUE TO VAPING NON-TOBACCO PRODUCT: ICD-10-CM

## 2022-10-25 PROCEDURE — 99213 OFFICE O/P EST LOW 20 MIN: CPT | Performed by: FAMILY MEDICINE

## 2022-10-25 PROCEDURE — 90471 IMMUNIZATION ADMIN: CPT | Performed by: FAMILY MEDICINE

## 2022-10-25 PROCEDURE — 90686 IIV4 VACC NO PRSV 0.5 ML IM: CPT | Performed by: FAMILY MEDICINE

## 2022-10-25 RX ORDER — DEXTROAMPHETAMINE SACCHARATE, AMPHETAMINE ASPARTATE, DEXTROAMPHETAMINE SULFATE AND AMPHETAMINE SULFATE 1.875; 1.875; 1.875; 1.875 MG/1; MG/1; MG/1; MG/1
TABLET ORAL
Qty: 30 TABLET | Refills: 0 | Status: SHIPPED | OUTPATIENT
Start: 2022-10-25 | End: 2022-11-29 | Stop reason: SDUPTHER

## 2022-11-07 DIAGNOSIS — F90.1 ATTENTION DEFICIT HYPERACTIVITY DISORDER (ADHD), PREDOMINANTLY HYPERACTIVE TYPE: ICD-10-CM

## 2022-11-07 NOTE — TELEPHONE ENCOUNTER
Caller: Mayo Landers    Relationship: Self    Best call back number: [phone]    Requested Prescriptions:   Requested Prescriptions     Pending Prescriptions Disp Refills   • lisdexamfetamine (Vyvanse) 30 MG capsule 30 capsule 0     Sig: Take 1 capsule by mouth Every Morning        Pharmacy where request should be sent:[facility] [phone]      Additional details provided by patient: PATIENT IS OUT OF MEDICATION    Does the patient have less than a 3 day supply:  [x] Yes  [] No    Kali Matos Rep   11/07/22 09:03 EST

## 2022-11-07 NOTE — TELEPHONE ENCOUNTER
Caller: Mayo Landers    Relationship: Self    Best call back number:  [phone]     Requested Prescriptions:   Requested Prescriptions     Pending Prescriptions Disp Refills   • lisdexamfetamine (Vyvanse) 30 MG capsule 30 capsule 0     Sig: Take 1 capsule by mouth Every Morning      Pharmacy where request should be sent:  [facility]      Additional details provided by patient:  PATIENT IS OUT OF THE MEDICATION     Does the patient have less than a 3 day supply:  [x] Yes  [] No

## 2022-11-07 NOTE — TELEPHONE ENCOUNTER
Rx Refill Note  Requested Prescriptions     Pending Prescriptions Disp Refills   • lisdexamfetamine (Vyvanse) 30 MG capsule 30 capsule 0     Sig: Take 1 capsule by mouth Every Morning      Last office visit with prescribing clinician: 10/25/2022      Next office visit with prescribing clinician: 1/25/2023}  Rula Martinez MA  11/07/22, 09:14 EST       Csa: 08/19/2020  uds : 07/14/2021

## 2022-11-29 DIAGNOSIS — Z72.0 TOBACCO ABUSE: ICD-10-CM

## 2022-11-29 DIAGNOSIS — F17.200 NICOTINE DEPENDENCE DUE TO VAPING NON-TOBACCO PRODUCT: ICD-10-CM

## 2022-11-29 DIAGNOSIS — F90.1 ATTENTION DEFICIT HYPERACTIVITY DISORDER (ADHD), PREDOMINANTLY HYPERACTIVE TYPE: ICD-10-CM

## 2022-11-29 NOTE — TELEPHONE ENCOUNTER
Rx Refill Note  Requested Prescriptions     Pending Prescriptions Disp Refills   • nicotine (Nicoderm CQ) 14 MG/24HR patch 28 each 0     Sig: Place 1 patch on the skin as directed by provider Daily. Start after 21 finishes   • amphetamine-dextroamphetamine (Adderall) 7.5 MG tablet 30 tablet 0     Si tab PO every afternoon      Last office visit with prescribing clinician: 10/25/2022   Last telemedicine visit with prescribing clinician: 2023   Next office visit with prescribing clinician: 2023                         Would you like a call back once the refill request has been completed: [] Yes [] No    If the office needs to give you a call back, can they leave a voicemail: [] Yes [] No    Chikis Patel MA  22, 08:30 EST

## 2022-11-30 RX ORDER — NICOTINE 21 MG/24HR
1 PATCH, TRANSDERMAL 24 HOURS TRANSDERMAL EVERY 24 HOURS
Qty: 28 EACH | Refills: 0 | Status: SHIPPED | OUTPATIENT
Start: 2022-11-30

## 2022-11-30 RX ORDER — DEXTROAMPHETAMINE SACCHARATE, AMPHETAMINE ASPARTATE, DEXTROAMPHETAMINE SULFATE AND AMPHETAMINE SULFATE 1.875; 1.875; 1.875; 1.875 MG/1; MG/1; MG/1; MG/1
TABLET ORAL
Qty: 30 TABLET | Refills: 0 | Status: SHIPPED | OUTPATIENT
Start: 2022-11-30 | End: 2023-01-09 | Stop reason: SDUPTHER

## 2022-12-06 DIAGNOSIS — F90.1 ATTENTION DEFICIT HYPERACTIVITY DISORDER (ADHD), PREDOMINANTLY HYPERACTIVE TYPE: ICD-10-CM

## 2022-12-06 NOTE — TELEPHONE ENCOUNTER
Caller: Mayo Landers    Relationship: Self    Best call back number: [phone]    Requested Prescriptions:   Requested Prescriptions     Pending Prescriptions Disp Refills   • lisdexamfetamine (Vyvanse) 30 MG capsule 30 capsule 0     Sig: Take 1 capsule by mouth Every Morning        Pharmacy where request should be sent: [facility] [id number] [address] & [address] O [facility] [phone] [facility] [phone] FX     Additional details provided by patient: 1 DAY REMAINING, WILL BE OUT TOMORROW 12/07/2022, PLEASE ADVISE     Does the patient have less than a 3 day supply:  [x] Yes  [] No    Would you like a call back once the refill request has been completed: [x] Yes [] No    If the office needs to give you a call back, can they leave a voicemail: [x] Yes [] No    Kali Jaffe Rep   12/06/22 16:29 EST

## 2023-01-07 DIAGNOSIS — F90.1 ATTENTION DEFICIT HYPERACTIVITY DISORDER (ADHD), PREDOMINANTLY HYPERACTIVE TYPE: ICD-10-CM

## 2023-01-07 RX ORDER — DEXTROAMPHETAMINE SACCHARATE, AMPHETAMINE ASPARTATE, DEXTROAMPHETAMINE SULFATE AND AMPHETAMINE SULFATE 1.875; 1.875; 1.875; 1.875 MG/1; MG/1; MG/1; MG/1
TABLET ORAL
Qty: 30 TABLET | Refills: 0 | Status: CANCELLED | OUTPATIENT
Start: 2023-01-07

## 2023-01-09 ENCOUNTER — OFFICE VISIT (OUTPATIENT)
Dept: FAMILY MEDICINE CLINIC | Facility: CLINIC | Age: 33
End: 2023-01-09
Payer: MEDICAID

## 2023-01-09 VITALS
SYSTOLIC BLOOD PRESSURE: 120 MMHG | TEMPERATURE: 98.2 F | OXYGEN SATURATION: 99 % | DIASTOLIC BLOOD PRESSURE: 76 MMHG | WEIGHT: 185.4 LBS | BODY MASS INDEX: 27.37 KG/M2 | HEART RATE: 111 BPM

## 2023-01-09 DIAGNOSIS — F90.1 ATTENTION DEFICIT HYPERACTIVITY DISORDER (ADHD), PREDOMINANTLY HYPERACTIVE TYPE: Primary | ICD-10-CM

## 2023-01-09 DIAGNOSIS — H92.01 RIGHT EAR PAIN: ICD-10-CM

## 2023-01-09 PROCEDURE — 99213 OFFICE O/P EST LOW 20 MIN: CPT | Performed by: FAMILY MEDICINE

## 2023-01-09 RX ORDER — DEXTROAMPHETAMINE SACCHARATE, AMPHETAMINE ASPARTATE, DEXTROAMPHETAMINE SULFATE AND AMPHETAMINE SULFATE 1.875; 1.875; 1.875; 1.875 MG/1; MG/1; MG/1; MG/1
TABLET ORAL
Qty: 30 TABLET | Refills: 0 | Status: SHIPPED | OUTPATIENT
Start: 2023-01-09 | End: 2023-01-23 | Stop reason: SDUPTHER

## 2023-01-09 NOTE — PROGRESS NOTES
Established Patient Office Visit      Patient Name: Mayo Landers  : 1990   MRN: [id number]   Care Team: Patient Care Team:  Francisco Javier Miller DO as PCP - General (Family Medicine)  Provider, No Known    Chief Complaint:    Chief Complaint   Patient presents with   • ADHD     Follow-up for ADHD       History of Present Illness: Mayo Landers is a 32 y.o. male who is here today for chief complaint.    HPI    The patient is an established patient today for a regular follow-up for controlled substance monitoring.    The patient reports that he is doing well on his medications. He states that he only uses his medication when he needs it. He reports that he is leaning towards the [employer] and seeing where that takes him, especially since he is 32 years old. He reports that he feels great physically.    He reports that he has not seen the hole in his ear since he was a teenager. He reports that he would like a referral to an ENT. He reports that his hearing is fine.    He reports that he has heard some wheezing from when he quit smoking. He reports that he has an inhaler.      The following portions of the patient's history were reviewed and updated as appropriate: allergies, current medications, past family history, past medical history, past social history, past surgical history and problem list.    Subjective      Review of Systems:   Review of Systems - See HPI    Past Medical History:   Past Medical History:   Diagnosis Date   • Chronic bronchitis (HCC)     DUe to vaping previously, no medications, no chronic issues   • Fatty liver    • Fracture        Past Surgical History:   Past Surgical History:   Procedure Laterality Date   • EAR TUBES         Family History:   Family History   Problem Relation Age of Onset   • Breast cancer Mother    • Hyperlipidemia Father    • Hypertension Father    • No Known Problems Sister    • Obesity Brother    • Klinefelter's syndrome Brother    • Breast cancer Maternal  Grandmother        Social History:   Social History     Socioeconomic History   • Marital status: Single   Tobacco Use   • Smoking status: Every Day     Packs/day: 0.00     Years: 2.00     Pack years: 0.00     Types: Electronic Cigarette, Cigarettes   • Smokeless tobacco: Never   Vaping Use   • Vaping Use: Every day   • Substances: Nicotine   • Devices: Disposable   • Passive vaping exposure: Yes   Substance and Sexual Activity   • Alcohol use: Yes     Comment: Ocassionally   • Drug use: No       Tobacco History:   Social History     Tobacco Use   Smoking Status Every Day   • Packs/day: 0.00   • Years: 2.00   • Pack years: 0.00   • Types: Electronic Cigarette, Cigarettes   Smokeless Tobacco Never       Medications:     Current Outpatient Medications:   •  amphetamine-dextroamphetamine (Adderall) 7.5 MG tablet, 1 tab PO every afternoon, Disp: 30 tablet, Rfl: 0  •  lisdexamfetamine (Vyvanse) 30 MG capsule, Take 1 capsule by mouth Every Morning, Disp: 30 capsule, Rfl: 0  •  nicotine (Nicoderm CQ) 14 MG/24HR patch, Place 1 patch on the skin as directed by provider Daily. Start after 21 finishes, Disp: 28 each, Rfl: 0  •  nicotine (Nicoderm CQ) 7 MG/24HR patch, Place 1 patch on the skin as directed by provider Daily., Disp: 30 each, Rfl: 2    Allergies:   No Known Allergies    Objective   Objective     Physical Exam:  Vital Signs:   Vitals:    01/09/23 1602   BP: 120/76   BP Location: Left arm   Patient Position: Sitting   Cuff Size: Adult   Pulse: 111   Temp: 98.2 °F (36.8 °C)   TempSrc: Infrared   SpO2: 99%   Weight: 84.1 kg (185 lb 6.4 oz)     Body mass index is 27.37 kg/m².     Physical Exam  Const: NAD, A & Ox4, Pleasant, Cooperative  Eyes: EOMI, no conjunctivitis  ENT: No nasal discharge present, neck supple  Cardiac: Regular rate and rhythm, no cyanosis  Resp: Respiratory rate within normal limits, no increased work of breathing, no audible wheezing or retractions noted  GI: No distention or ascites  MSK: Motor  and sensation grossly intact in bilateral upper extremities  Neurologic: CN II-XII grossly intact  Psych: Appropriate mood and behavior.  Skin: Warm, dry  Procedures/Radiology     Procedures  No radiology results for the last 7 days     Assessment & Plan   Assessment / Plan      Assessment/Plan:   Problems Addressed This Visit  Diagnoses and all orders for this visit:    1. Attention deficit hyperactivity disorder (ADHD), predominantly hyperactive type (Primary)  -     lisdexamfetamine (Vyvanse) 30 MG capsule; Take 1 capsule by mouth Every Morning  Dispense: 30 capsule; Refill: 0  -     amphetamine-dextroamphetamine (Adderall) 7.5 MG tablet; 1 tab PO every afternoon  Dispense: 30 tablet; Refill: 0    2. Right ear pain  -     Ambulatory Referral to ENT (Otolaryngology)      Problem List Items Addressed This Visit        Mental Health    Attention deficit hyperactivity disorder (ADHD), predominantly hyperactive type - Primary    Overview     Formulary change from Adderall XR 10mg to Vyvanse 30mg daily in August 2021         Relevant Medications    lisdexamfetamine (Vyvanse) 30 MG capsule    amphetamine-dextroamphetamine (Adderall) 7.5 MG tablet   Other Visit Diagnoses     Right ear pain        Relevant Orders    Ambulatory Referral to ENT (Otolaryngology) (Completed)          1. Anxiety and depression.  - The patient is doing well on his current medication regimen. He will continue to follow up with his therapist.    2. Right ear perforation.  - I will refer him to ENT.      There are no Patient Instructions on file for this visit.    Follow Up:   Return in about 3 months (around 4/9/2023) for Controlled substance 3-month.    DO [address]  [facility]  [address] [facility] [address]  Fax [phone]  Phone [phone]       Transcribed from ambient dictation for Francisco Javier Miller DO by Tegan Flores.  01/09/23   16:34  EST    Patient or patient representative verbalized consent to the visit recording.  I have personally performed the services described in this document as transcribed by the above individual, and it is both accurate and complete.

## 2023-01-12 DIAGNOSIS — F90.1 ATTENTION DEFICIT HYPERACTIVITY DISORDER (ADHD), PREDOMINANTLY HYPERACTIVE TYPE: ICD-10-CM

## 2023-01-12 RX ORDER — DEXTROAMPHETAMINE SACCHARATE, AMPHETAMINE ASPARTATE, DEXTROAMPHETAMINE SULFATE AND AMPHETAMINE SULFATE 1.875; 1.875; 1.875; 1.875 MG/1; MG/1; MG/1; MG/1
TABLET ORAL
Qty: 30 TABLET | Refills: 0 | OUTPATIENT
Start: 2023-01-12

## 2023-01-12 NOTE — TELEPHONE ENCOUNTER
Caller: Mayo Landers ZOHREH    Relationship: Self    Best call back number: [phone]    Requested Prescriptions:   Requested Prescriptions     Pending Prescriptions Disp Refills   • amphetamine-dextroamphetamine (Adderall) 7.5 MG tablet 30 tablet 0     Si tab PO every afternoon   • lisdexamfetamine (Vyvanse) 30 MG capsule 30 capsule 0     Sig: Take 1 capsule by mouth Every Morning        Pharmacy where request should be sent: [facility] - [address] [phone] [facility] [phone]      Additional details provided by patient: PATIENT IS OUT OF THE VYVANSE AND HAS A FEW LEFT OF THE ADDERALL. HE IS NEEDING TO CHANGE PHARMACIES DUE TO HIS INSURANCE.     Does the patient have less than a 3 day supply:  [x] Yes  [] No    Would you like a call back once the refill request has been completed: [x] Yes [] No    If the office needs to give you a call back, can they leave a voicemail: [x] Yes [] No    Kali Ambrosio Rep   23 15:13 EST

## 2023-01-23 ENCOUNTER — TELEPHONE (OUTPATIENT)
Dept: FAMILY MEDICINE CLINIC | Facility: CLINIC | Age: 33
End: 2023-01-23
Payer: MEDICAID

## 2023-01-23 DIAGNOSIS — F90.1 ATTENTION DEFICIT HYPERACTIVITY DISORDER (ADHD), PREDOMINANTLY HYPERACTIVE TYPE: ICD-10-CM

## 2023-01-23 NOTE — TELEPHONE ENCOUNTER
Caller: Mayo Landers ZOHREH    Relationship: Self    Best call back number: [phone]    Requested Prescriptions:   Requested Prescriptions     Pending Prescriptions Disp Refills   • lisdexamfetamine (Vyvanse) 30 MG capsule 30 capsule 0     Sig: Take 1 capsule by mouth Every Morning   • amphetamine-dextroamphetamine (Adderall) 7.5 MG tablet 30 tablet 0     Si tab PO every afternoon        Pharmacy where request should be sent: [id number] [address] AT [facility] [address] [facility] [facility] [phone] [facility] [phone] FX     Additional details provided by patient:     Does the patient have less than a 3 day supply:  [x] Yes  [] No    Would you like a call back once the refill request has been completed: [x] Yes [] No    If the office needs to give you a call back, can they leave a voicemail: [] Yes [x] No    Soren Agee, PCT   23 15:13 EST

## 2023-01-23 NOTE — TELEPHONE ENCOUNTER
Last fill: 1/9/23  Last office visit: 1/9/23  Next office visit: 4/17/23  CSA up-to-date? yes  Date of last UDS: 7/25/22  UDS consistent: consistent

## 2023-01-24 RX ORDER — DEXTROAMPHETAMINE SACCHARATE, AMPHETAMINE ASPARTATE, DEXTROAMPHETAMINE SULFATE AND AMPHETAMINE SULFATE 1.875; 1.875; 1.875; 1.875 MG/1; MG/1; MG/1; MG/1
TABLET ORAL
Qty: 30 TABLET | Refills: 0 | Status: SHIPPED | OUTPATIENT
Start: 2023-01-24 | End: 2023-02-20 | Stop reason: SDUPTHER

## 2023-01-26 ENCOUNTER — PRIOR AUTHORIZATION (OUTPATIENT)
Dept: FAMILY MEDICINE CLINIC | Facility: CLINIC | Age: 33
End: 2023-01-26
Payer: MEDICAID

## 2023-01-30 NOTE — TELEPHONE ENCOUNTER
Approved,on January 26  CaseId:[id number];Status:Approved;Review Type:Prior Auth;Coverage Start Date:12/27/2022;Coverage End Date:01/26/2024

## 2023-02-20 DIAGNOSIS — F90.1 ATTENTION DEFICIT HYPERACTIVITY DISORDER (ADHD), PREDOMINANTLY HYPERACTIVE TYPE: ICD-10-CM

## 2023-02-20 RX ORDER — DEXTROAMPHETAMINE SACCHARATE, AMPHETAMINE ASPARTATE, DEXTROAMPHETAMINE SULFATE AND AMPHETAMINE SULFATE 1.875; 1.875; 1.875; 1.875 MG/1; MG/1; MG/1; MG/1
TABLET ORAL
Qty: 30 TABLET | Refills: 0 | Status: SHIPPED | OUTPATIENT
Start: 2023-02-20 | End: 2023-03-27 | Stop reason: SDUPTHER

## 2023-02-20 NOTE — TELEPHONE ENCOUNTER
Incoming Refill Request      Medication requested (name and dose): lisdexamfetamine (Vyvanse) 30 MG capsule, amphetamine-dextroamphetamine (Adderall) 7.5 MG tablet    [facility]    Additional details provided by patient: PATIENT HAS ONE WEEK LEFT    Best call back number: [phone]    Does the patient have less than a 3 day supply:  [] Yes  [x] No    Kali Huerta Rep  02/20/23, 10:46 EST

## 2023-02-20 NOTE — TELEPHONE ENCOUNTER
Rx Refill Note  Requested Prescriptions     Pending Prescriptions Disp Refills   • lisdexamfetamine (Vyvanse) 30 MG capsule 30 capsule 0     Sig: Take 1 capsule by mouth Every Morning   • amphetamine-dextroamphetamine (Adderall) 7.5 MG tablet 30 tablet 0     Si tab PO every afternoon      Last office visit with prescribing clinician: 2023   Last telemedicine visit with prescribing clinician: 2023   Next office visit with prescribing clinician: 2023     csa 2022   uds 2022                 Would you like a call back once the refill request has been completed: [] Yes [] No    If the office needs to give you a call back, can they leave a voicemail: [] Yes [] No    Gerald Maldonado MA  23, 11:13 EST

## 2023-03-27 DIAGNOSIS — F90.1 ATTENTION DEFICIT HYPERACTIVITY DISORDER (ADHD), PREDOMINANTLY HYPERACTIVE TYPE: ICD-10-CM

## 2023-03-27 RX ORDER — DEXTROAMPHETAMINE SACCHARATE, AMPHETAMINE ASPARTATE, DEXTROAMPHETAMINE SULFATE AND AMPHETAMINE SULFATE 1.875; 1.875; 1.875; 1.875 MG/1; MG/1; MG/1; MG/1
TABLET ORAL
Qty: 30 TABLET | Refills: 0 | Status: SHIPPED | OUTPATIENT
Start: 2023-03-27

## 2023-03-27 NOTE — TELEPHONE ENCOUNTER
Rx Refill Note  Requested Prescriptions     Pending Prescriptions Disp Refills   • lisdexamfetamine (Vyvanse) 30 MG capsule 30 capsule 0     Sig: Take 1 capsule by mouth Every Morning   • amphetamine-dextroamphetamine (Adderall) 7.5 MG tablet 30 tablet 0     Si tab PO every afternoon      Last office visit with prescribing clinician: 2023   Last telemedicine visit with prescribing clinician: 2023   Next office visit with prescribing clinician: 2023        csa and uds 2022                 Would you like a call back once the refill request has been completed: [] Yes [] No    If the office needs to give you a call back, can they leave a voicemail: [] Yes [] No    Gerald Maldonado MA  23, 13:03 EDT

## 2023-04-27 DIAGNOSIS — F90.1 ATTENTION DEFICIT HYPERACTIVITY DISORDER (ADHD), PREDOMINANTLY HYPERACTIVE TYPE: ICD-10-CM

## 2023-04-27 RX ORDER — DEXTROAMPHETAMINE SACCHARATE, AMPHETAMINE ASPARTATE, DEXTROAMPHETAMINE SULFATE AND AMPHETAMINE SULFATE 1.875; 1.875; 1.875; 1.875 MG/1; MG/1; MG/1; MG/1
TABLET ORAL
Qty: 30 TABLET | Refills: 0 | Status: SHIPPED | OUTPATIENT
Start: 2023-04-27

## 2023-04-27 NOTE — TELEPHONE ENCOUNTER
Rx Refill Note  Requested Prescriptions     Pending Prescriptions Disp Refills   • lisdexamfetamine (Vyvanse) 30 MG capsule 30 capsule 0     Sig: Take 1 capsule by mouth Every Morning   • amphetamine-dextroamphetamine (Adderall) 7.5 MG tablet 30 tablet 0     Si tab PO every afternoon      Last office visit with prescribing clinician: 2023   Last telemedicine visit with prescribing clinician: 2023   Next office visit with prescribing clinician: 2023                       csa and uds 2022  Would you like a call back once the refill request has been completed: [] Yes [] No    If the office needs to give you a call back, can they leave a voicemail: [] Yes [] No    Gerald Maldonado MA  23, 11:11 EDT

## 2023-04-27 NOTE — TELEPHONE ENCOUNTER
Caller: Mayo Landers    Relationship: Self    Best call back number: [phone]    Requested Prescriptions:   Requested Prescriptions     Pending Prescriptions Disp Refills   • lisdexamfetamine (Vyvanse) 30 MG capsule 30 capsule 0     Sig: Take 1 capsule by mouth Every Morning   • amphetamine-dextroamphetamine (Adderall) 7.5 MG tablet 30 tablet 0     Si tab PO every afternoon        Pharmacy where request should be sent: [id number] [address] [facility] [address] [facility] [phone] [facility] [phone] FX     Last office visit with prescribing clinician: 2023   Last telemedicine visit with prescribing clinician: 2023   Next office visit with prescribing clinician: 2023     Additional details provided by patient: PATIENT IS OUT OF MEDICATION.    Does the patient have less than a 3 day supply:  [x] Yes  [] No    Would you like a call back once the refill request has been completed: [] Yes [x] No    If the office needs to give you a call back, can they leave a voicemail: [] Yes [x] No    Kali Adamson Rep   23 11:05 EDT

## 2023-05-01 ENCOUNTER — OFFICE VISIT (OUTPATIENT)
Dept: FAMILY MEDICINE CLINIC | Facility: CLINIC | Age: 33
End: 2023-05-01
Payer: MEDICAID

## 2023-05-01 VITALS
TEMPERATURE: 98 F | WEIGHT: 177 LBS | SYSTOLIC BLOOD PRESSURE: 118 MMHG | DIASTOLIC BLOOD PRESSURE: 72 MMHG | BODY MASS INDEX: 26.13 KG/M2

## 2023-05-01 DIAGNOSIS — F90.1 ATTENTION DEFICIT HYPERACTIVITY DISORDER (ADHD), PREDOMINANTLY HYPERACTIVE TYPE: Primary | ICD-10-CM

## 2023-05-01 DIAGNOSIS — E55.9 VITAMIN D DEFICIENCY: ICD-10-CM

## 2023-05-01 PROCEDURE — 99213 OFFICE O/P EST LOW 20 MIN: CPT | Performed by: FAMILY MEDICINE

## 2023-05-19 NOTE — PROGRESS NOTES
Subjective   Mayo Landers is a 32 y.o. male.     Chief Complaint   Patient presents with   • ADHD     Follow-up ADHD       History of Present Illness     Mayo Landers presents today for   Chief Complaint   Patient presents with   • ADHD     Follow-up ADHD     he is in need of chronic disease followup. he denies acute complaints today.    This patient is accompanied by their self who contributes to the history of their care.    The following portions of the patient's history were reviewed and updated as appropriate: allergies, current medications, past family history, past medical history, past social history, past surgical history and problem list.    Current Outpatient Medications   Medication Instructions   • amphetamine-dextroamphetamine (Adderall) 7.5 MG tablet 1 tab PO every afternoon   • lisdexamfetamine (VYVANSE) 30 mg, Oral, Every Morning   • nicotine (Nicoderm CQ) 14 MG/24HR patch 1 patch, Transdermal, Every 24 Hours, Start after 21 finishes   • nicotine (Nicoderm CQ) 7 MG/24HR patch 1 patch, Transdermal, Every 24 Hours     Active Ambulatory Problems     Diagnosis Date Noted   • Attention deficit hyperactivity disorder (ADHD), predominantly hyperactive type 04/25/2019     Resolved Ambulatory Problems     Diagnosis Date Noted   • No Resolved Ambulatory Problems     Past Medical History:   Diagnosis Date   • Chronic bronchitis    • Fatty liver    • Fracture      Past Surgical History:   Procedure Laterality Date   • EAR TUBES       Family History   Problem Relation Age of Onset   • Breast cancer Mother    • Hyperlipidemia Father    • Hypertension Father    • No Known Problems Sister    • Obesity Brother    • Klinefelter's syndrome Brother    • Breast cancer Maternal Grandmother      Social History     Socioeconomic History   • Marital status: Single   Tobacco Use   • Smoking status: Every Day     Packs/day: 0.00     Years: 2.00     Pack years: 0.00     Types: Electronic Cigarette, Cigarettes   • Smokeless  tobacco: Never   Vaping Use   • Vaping Use: Every day   • Substances: Nicotine   • Devices: Disposable   • Passive vaping exposure: Yes   Substance and Sexual Activity   • Alcohol use: Yes     Comment: Ocassionally   • Drug use: No       Review of Systems  Review of Systems -  General ROS: negative for - chills, fever or night sweats  Cardiovascular ROS: no chest pain or dyspnea on exertion  Gastrointestinal ROS: no abdominal pain, change in bowel habits, or black or bloody stools  Genito-Urinary ROS: no trouble voiding or gross hematuria    Objective   Blood pressure 118/72, temperature 98 °F (36.7 °C), temperature source Infrared, weight 80.3 kg (177 lb).  Nursing note reviewed  Physical Exam  Const: NAD, A&Ox4, Pleasant, Cooperative  Eyes: EOMI, no conjunctivitis  ENT: No nasal discharge present, neck supple  Cardiac: Regular rate and rhythm, no cyanosis  Resp: Respiratory rate within normal limits, no increased work of breathing, no audible wheezing or retractions noted  GI: No distention or ascites  Procedures  Assessment & Plan     Problem List Items Addressed This Visit        Mental Health    Attention deficit hyperactivity disorder (ADHD), predominantly hyperactive type - Primary    Overview     Formulary change from Adderall XR 10mg to Vyvanse 30mg daily in August 2021        Other Visit Diagnoses     Vitamin D deficiency        Relevant Orders    Vitamin D,25-Hydroxy        See patient diagnoses and orders along with patient instructions for assessment, plan, and changes to care for patient.    There are no Patient Instructions on file for this visit.    No follow-ups on file.    Ambulatory progress note signed and attested to by Francisco Javier Miller D.O.

## 2023-05-26 DIAGNOSIS — Z72.0 TOBACCO ABUSE: ICD-10-CM

## 2023-05-26 DIAGNOSIS — F17.200 NICOTINE DEPENDENCE DUE TO VAPING NON-TOBACCO PRODUCT: ICD-10-CM

## 2023-05-26 DIAGNOSIS — F90.1 ATTENTION DEFICIT HYPERACTIVITY DISORDER (ADHD), PREDOMINANTLY HYPERACTIVE TYPE: ICD-10-CM

## 2023-05-26 NOTE — TELEPHONE ENCOUNTER
Caller: Mayo Landers ZOHREH    Relationship: Self    Best call back number: [phone]    Requested Prescriptions:   Requested Prescriptions     Pending Prescriptions Disp Refills   • lisdexamfetamine (Vyvanse) 30 MG capsule 30 capsule 0     Sig: Take 1 capsule by mouth Every Morning   • amphetamine-dextroamphetamine (Adderall) 7.5 MG tablet 30 tablet 0     Si tab PO every afternoon   • nicotine (Nicoderm CQ) 14 MG/24HR patch 28 each 0     Sig: Place 1 patch on the skin as directed by provider Daily. Start after 21 finishes        Pharmacy where request should be sent: [facility] #[id number] - [facility] [address] AT [facility] [address] [facility] [phone] [facility] [phone] FX     Last office visit with prescribing clinician: 2023   Last telemedicine visit with prescribing clinician: Visit date not found   Next office visit with prescribing clinician: Visit date not found     Additional details provided by patient:     Does the patient have less than a 3 day supply:  [x] Yes  [] No    Would you like a call back once the refill request has been completed: [] Yes [x] No    If the office needs to give you a call back, can they leave a voicemail: [] Yes [x] No    Kali Pfeiffer Rep   23 11:05 EDT

## 2023-05-30 RX ORDER — DEXTROAMPHETAMINE SACCHARATE, AMPHETAMINE ASPARTATE, DEXTROAMPHETAMINE SULFATE AND AMPHETAMINE SULFATE 1.875; 1.875; 1.875; 1.875 MG/1; MG/1; MG/1; MG/1
TABLET ORAL
Qty: 30 TABLET | Refills: 0 | Status: SHIPPED | OUTPATIENT
Start: 2023-05-30

## 2023-05-30 RX ORDER — NICOTINE 21 MG/24HR
1 PATCH, TRANSDERMAL 24 HOURS TRANSDERMAL EVERY 24 HOURS
Qty: 28 EACH | Refills: 0 | Status: SHIPPED | OUTPATIENT
Start: 2023-05-30

## 2023-05-30 NOTE — TELEPHONE ENCOUNTER
Rx Refill Note  Requested Prescriptions     Pending Prescriptions Disp Refills   • lisdexamfetamine (Vyvanse) 30 MG capsule 30 capsule 0     Sig: Take 1 capsule by mouth Every Morning   • amphetamine-dextroamphetamine (Adderall) 7.5 MG tablet 30 tablet 0     Si tab PO every afternoon     Signed Prescriptions Disp Refills   • nicotine (Nicoderm CQ) 14 MG/24HR patch 28 each 0     Sig: Place 1 patch on the skin as directed by provider Daily. Start after 21 finishes     Authorizing Provider: ELSIE CONTEH     Ordering User: SUE COLLINS      Last office visit with prescribing clinician: 2023   Last telemedicine visit with prescribing clinician: Visit date not found   Next office visit with prescribing clinician: Visit date not found                         Would you like a call back once the refill request has been completed: [] Yes [] No    If the office needs to give you a call back, can they leave a voicemail: [] Yes [] No    Sue Collins MA  23, 07:59 EDT

## 2023-06-15 ENCOUNTER — TELEPHONE (OUTPATIENT)
Dept: FAMILY MEDICINE CLINIC | Facility: CLINIC | Age: 33
End: 2023-06-15
Payer: MEDICAID

## 2023-06-15 NOTE — TELEPHONE ENCOUNTER
Caller: Mayo Landers    Relationship: Self    Best call back number: [phone]    What medications are you currently taking:   Current Outpatient Medications on File Prior to Visit   Medication Sig Dispense Refill   • amphetamine-dextroamphetamine (Adderall) 7.5 MG tablet 1 tab PO every afternoon 30 tablet 0   • lisdexamfetamine (Vyvanse) 30 MG capsule Take 1 capsule by mouth Every Morning 30 capsule 0   • nicotine (Nicoderm CQ) 14 MG/24HR patch Place 1 patch on the skin as directed by provider Daily. Start after 21 finishes 28 each 0   • nicotine (Nicoderm CQ) 7 MG/24HR patch Place 1 patch on the skin as directed by provider Daily. 30 each 2     No current facility-administered medications on file prior to visit.      When did you start taking these medications: A FEW YEARS     Which medication are you concerned about: ADDERALL 7.5 MG TAB    Who prescribed you this medication: ELSIE CONTEH, DO    What are your concerns: PATIENT IS CALLING TO INFORM THE PROVIDER THAT HIS PHARMACY DO NOT HAVE THE ADDERALL 7.5 MG TAB IN STOCK. THE PATIENT NEED HIS MEDICATION AND IS IN NEED OF A ANSWER AS TO WHAT HE CAN DO OR IF THERE IS ANOTHER ALTERNATIVE FOR HIM WITH THIS MEDICSTION.    PLEASE CALL PATIENT TO DISCUSS THIS CONCERN WITH HIM ASAP.      How long have you had these concerns: 05/30/23

## 2023-06-15 NOTE — TELEPHONE ENCOUNTER
Advised patient to contact surrounding pharmacies to check availability. He will keep us notified further

## 2023-07-17 ENCOUNTER — OFFICE VISIT (OUTPATIENT)
Dept: FAMILY MEDICINE CLINIC | Facility: CLINIC | Age: 33
End: 2023-07-17
Payer: MEDICAID

## 2023-07-17 VITALS — DIASTOLIC BLOOD PRESSURE: 70 MMHG | SYSTOLIC BLOOD PRESSURE: 110 MMHG | BODY MASS INDEX: 25.59 KG/M2 | WEIGHT: 173.4 LBS

## 2023-07-17 DIAGNOSIS — E55.9 VITAMIN D DEFICIENCY: ICD-10-CM

## 2023-07-17 DIAGNOSIS — F90.1 ATTENTION DEFICIT HYPERACTIVITY DISORDER (ADHD), PREDOMINANTLY HYPERACTIVE TYPE: Primary | ICD-10-CM

## 2023-07-17 PROCEDURE — 99214 OFFICE O/P EST MOD 30 MIN: CPT | Performed by: FAMILY MEDICINE

## 2023-07-24 ENCOUNTER — TELEPHONE (OUTPATIENT)
Dept: FAMILY MEDICINE CLINIC | Facility: CLINIC | Age: 33
End: 2023-07-24
Payer: MEDICAID

## 2023-07-24 DIAGNOSIS — Z51.81 THERAPEUTIC DRUG MONITORING: Primary | ICD-10-CM

## 2023-07-24 RX ORDER — DEXTROAMPHETAMINE SACCHARATE, AMPHETAMINE ASPARTATE, DEXTROAMPHETAMINE SULFATE AND AMPHETAMINE SULFATE 1.875; 1.875; 1.875; 1.875 MG/1; MG/1; MG/1; MG/1
TABLET ORAL
Qty: 30 TABLET | Refills: 0 | Status: SHIPPED | OUTPATIENT
Start: 2023-07-24 | End: 2023-07-28 | Stop reason: SDUPTHER

## 2023-07-24 NOTE — TELEPHONE ENCOUNTER
Attempted to contact patient, no answer. Left voicemail for patient to call the office back (office # given).     Hub may relay message & document.     Francisco Javier Miller, DO57 minutes ago (10:19 AM)     Refills were recently sent, he will just need to come in for repeat UDS at some point before his next prescription next month.      Pt can stop by the lab as a walk-in Monday-Friday between 8:00-4:00 to complete the urine drug screen.

## 2023-07-24 NOTE — TELEPHONE ENCOUNTER
----- Message from Francisco Javier Miller DO sent at 7/24/2023  9:02 AM EDT -----    UDS was inappropriate for prescribed substances.  Please alert the patient that they will need to undergo a repeat UDS prior to further controlled substance prescribing.    LVM for patient to return my call.  Pt should come in sometime this week to repeat UDS.   Can come between 8am-4pm today til Friday.    Hub may relay message and document.

## 2023-07-24 NOTE — TELEPHONE ENCOUNTER
Refills were recently sent, he will just need to come in for repeat UDS at some point before his next prescription next month.

## 2023-07-25 NOTE — TELEPHONE ENCOUNTER
Message from Francisco Javier Miller DO sent at 7/24/2023  9:02 AM EDT -----     UDS was inappropriate for prescribed substances.  Please alert the patient that they will need to undergo a repeat UDS prior to further controlled substance prescribing.     LVM for patient to return my call.  Pt should come in sometime this week to repeat UDS.   Can come between 8am-4pm today til Friday.    ones, Francisco Javier Marcano DOYesterday (10:19 AM)       Refills were recently sent, he will just need to come in for repeat UDS at some point before his next prescription next month.      Called and left  for patient to return call    OK FOR HUB TO RELAY MESSAGE AND SCHEDULE APPOINTMENT

## 2023-07-28 ENCOUNTER — LAB (OUTPATIENT)
Dept: LAB | Facility: HOSPITAL | Age: 33
End: 2023-07-28
Payer: MEDICAID

## 2023-07-28 ENCOUNTER — TELEPHONE (OUTPATIENT)
Dept: FAMILY MEDICINE CLINIC | Facility: CLINIC | Age: 33
End: 2023-07-28
Payer: MEDICAID

## 2023-07-28 DIAGNOSIS — Z51.81 THERAPEUTIC DRUG MONITORING: ICD-10-CM

## 2023-07-28 DIAGNOSIS — F90.1 ATTENTION DEFICIT HYPERACTIVITY DISORDER (ADHD), PREDOMINANTLY HYPERACTIVE TYPE: ICD-10-CM

## 2023-07-28 DIAGNOSIS — E55.9 VITAMIN D DEFICIENCY: ICD-10-CM

## 2023-07-28 LAB — 25(OH)D3 SERPL-MCNC: 12.4 NG/ML (ref 30–100)

## 2023-07-28 PROCEDURE — 80307 DRUG TEST PRSMV CHEM ANLYZR: CPT

## 2023-07-28 PROCEDURE — 82306 VITAMIN D 25 HYDROXY: CPT

## 2023-07-28 PROCEDURE — G0483 DRUG TEST DEF 22+ CLASSES: HCPCS

## 2023-07-28 RX ORDER — DEXTROAMPHETAMINE SACCHARATE, AMPHETAMINE ASPARTATE, DEXTROAMPHETAMINE SULFATE AND AMPHETAMINE SULFATE 1.875; 1.875; 1.875; 1.875 MG/1; MG/1; MG/1; MG/1
TABLET ORAL
Qty: 30 TABLET | Refills: 0 | Status: SHIPPED | OUTPATIENT
Start: 2023-07-28

## 2023-07-28 NOTE — TELEPHONE ENCOUNTER
Patient is requesting his Vyvanse and his Adderall sent to the [facility] on [address].

## 2023-07-31 ENCOUNTER — PRIOR AUTHORIZATION (OUTPATIENT)
Dept: FAMILY MEDICINE CLINIC | Facility: CLINIC | Age: 33
End: 2023-07-31
Payer: MEDICAID

## 2023-08-03 RX ORDER — ERGOCALCIFEROL 1.25 MG/1
50000 CAPSULE ORAL WEEKLY
Qty: 12 CAPSULE | Refills: 0 | Status: SHIPPED | OUTPATIENT
Start: 2023-08-03 | End: 2023-10-20

## 2023-08-25 DIAGNOSIS — F90.1 ATTENTION DEFICIT HYPERACTIVITY DISORDER (ADHD), PREDOMINANTLY HYPERACTIVE TYPE: ICD-10-CM

## 2023-08-25 RX ORDER — DEXTROAMPHETAMINE SACCHARATE, AMPHETAMINE ASPARTATE, DEXTROAMPHETAMINE SULFATE AND AMPHETAMINE SULFATE 1.875; 1.875; 1.875; 1.875 MG/1; MG/1; MG/1; MG/1
TABLET ORAL
Qty: 30 TABLET | Refills: 0 | Status: SHIPPED | OUTPATIENT
Start: 2023-08-25

## 2023-08-25 NOTE — TELEPHONE ENCOUNTER
Caller: Mayo Landers    Relationship: Self    Best call back number: [phone]     Requested Prescriptions:   Requested Prescriptions     Pending Prescriptions Disp Refills    lisdexamfetamine (Vyvanse) 30 MG capsule 30 capsule 0     Sig: Take 1 capsule by mouth Every Morning    amphetamine-dextroamphetamine (Adderall) 7.5 MG tablet 30 tablet 0     Si tab PO every afternoon   -NICOTINE (NICODERM CQ) 14 MG/24HR PATCH    Pharmacy where request should be sent: [facility] #[id number] - [place], [place] - [address] AT [facility] [address] [facility] [facility] [phone]  - [phone] FX     Last office visit with prescribing clinician: 2023   Last telemedicine visit with prescribing clinician: Visit date not found   Next office visit with prescribing clinician: Visit date not found     Additional details provided by patient: PATIENT HAS AT LEAST 3 DAYS REMAINING.     Does the patient have less than a 3 day supply:  [] Yes  [x] No    Would you like a call back once the refill request has been completed: [] Yes [x] No    If the office needs to give you a call back, can they leave a voicemail: [] Yes [x] No    Kali Bond Rep   23 10:49 EDT

## 2023-08-25 NOTE — TELEPHONE ENCOUNTER
Rx Refill Note  Requested Prescriptions     Pending Prescriptions Disp Refills    lisdexamfetamine (Vyvanse) 30 MG capsule 30 capsule 0     Sig: Take 1 capsule by mouth Every Morning    amphetamine-dextroamphetamine (Adderall) 7.5 MG tablet 30 tablet 0     Si tab PO every afternoon      Last office visit with prescribing clinician: 2023   Last telemedicine visit with prescribing clinician: Visit date not found   Next office visit with prescribing clinician: Visit date not found                         Would you like a call back once the refill request has been completed: [] Yes [] No    If the office needs to give you a call back, can they leave a voicemail: [] Yes [] No    Gerald Maldonado MA  23, 13:58 EDT

## 2023-09-14 ENCOUNTER — TELEPHONE (OUTPATIENT)
Dept: FAMILY MEDICINE CLINIC | Facility: CLINIC | Age: 33
End: 2023-09-14
Payer: MEDICAID

## 2023-09-14 NOTE — TELEPHONE ENCOUNTER
Caller: Mayo Landers    Relationship: Self    Best call back number: [phone]    What is the best time to reach you: ANYTIME     Who are you requesting to speak with (clinical staff, provider,  specific staff member): CLINICAL STAFF     What was the call regarding: PATIENT IS CALLING ABOUT THE EMOTIONAL SUPPORT ANIMAL PAPERS THAT HIS APARTMENT WAS TO REACH OUT TO THE OFFICE ABOUT. HE IS LOOKING TO SEE IF THIS HAS BEEN RECEIVED AND IF HE CAN GET THAT SENT BACK TO HIM FILLED OUT ON [employer].     Is it okay if the provider responds through [employer]: YES

## 2023-09-15 NOTE — TELEPHONE ENCOUNTER
LVM with office # given.     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   If patient would like to contact his apartment and update our fax # incase they need it   Fax [phone]    Hub may relay message and document.

## 2023-09-21 NOTE — TELEPHONE ENCOUNTER
Caller: Mayo Landers    Relationship: Self    What was the call regarding: HUB READ MESSAGE AND PATIENT IS AWARE AND VOICED UNDERSTANDING

## 2023-09-25 DIAGNOSIS — Z72.0 TOBACCO ABUSE: ICD-10-CM

## 2023-09-25 DIAGNOSIS — F90.1 ATTENTION DEFICIT HYPERACTIVITY DISORDER (ADHD), PREDOMINANTLY HYPERACTIVE TYPE: ICD-10-CM

## 2023-09-25 DIAGNOSIS — F17.200 NICOTINE DEPENDENCE DUE TO VAPING NON-TOBACCO PRODUCT: ICD-10-CM

## 2023-09-25 NOTE — TELEPHONE ENCOUNTER
Rx Refill Note  Requested Prescriptions     Pending Prescriptions Disp Refills    amphetamine-dextroamphetamine (Adderall) 7.5 MG tablet 30 tablet 0     Si tab PO every afternoon    lisdexamfetamine (Vyvanse) 30 MG capsule 30 capsule 0     Sig: Take 1 capsule by mouth Every Morning    nicotine (Nicoderm CQ) 14 MG/24HR patch 28 each 0     Sig: Place 1 patch on the skin as directed by provider Daily. Start after 21 finishes      Last office visit with prescribing clinician: 2023   Last telemedicine visit with prescribing clinician: Visit date not found   Next office visit with prescribing clinician: 10/3/2023                         Would you like a call back once the refill request has been completed: [] Yes [] No    If the office needs to give you a call back, can they leave a voicemail: [] Yes [] No    Gerald Maldonado MA  23, 15:13 EDT

## 2023-09-25 NOTE — TELEPHONE ENCOUNTER
Caller: Mayo Landers    Relationship: Self    Best call back number:     [phone]       Requested Prescriptions:   Requested Prescriptions     Pending Prescriptions Disp Refills    amphetamine-dextroamphetamine (Adderall) 7.5 MG tablet 30 tablet 0     Si tab PO every afternoon    lisdexamfetamine (Vyvanse) 30 MG capsule 30 capsule 0     Sig: Take 1 capsule by mouth Every Morning    nicotine (Nicoderm CQ) 14 MG/24HR patch 28 each 0     Sig: Place 1 patch on the skin as directed by provider Daily. Start after 21 finishes        Pharmacy where request should be sent: [facility] #[id number] [place], [place] - [address] [facility] [address] [facility] [phone] [facility] [phone] FX     Last office visit with prescribing clinician: 2023   Last telemedicine visit with prescribing clinician: Visit date not found   Next office visit with prescribing clinician: 10/3/2023     Additional details provided by patient: PATIENT IS OUT OF VYVANSE    Does the patient have less than a 3 day supply:  [x] Yes  [] No    Would you like a call back once the refill request has been completed: [x] Yes [] No    If the office needs to give you a call back, can they leave a voicemail: [x] Yes [] No    Kali Quinn   23 14:26 EDT

## 2023-09-27 RX ORDER — NICOTINE 21 MG/24HR
1 PATCH, TRANSDERMAL 24 HOURS TRANSDERMAL EVERY 24 HOURS
Qty: 28 EACH | Refills: 0 | Status: SHIPPED | OUTPATIENT
Start: 2023-09-27

## 2023-09-27 RX ORDER — DEXTROAMPHETAMINE SACCHARATE, AMPHETAMINE ASPARTATE, DEXTROAMPHETAMINE SULFATE AND AMPHETAMINE SULFATE 1.875; 1.875; 1.875; 1.875 MG/1; MG/1; MG/1; MG/1
TABLET ORAL
Qty: 30 TABLET | Refills: 0 | Status: SHIPPED | OUTPATIENT
Start: 2023-09-27

## 2023-09-27 RX ORDER — LISDEXAMFETAMINE DIMESYLATE CAPSULES 30 MG/1
30 CAPSULE ORAL EVERY MORNING
Qty: 30 CAPSULE | Refills: 0 | Status: SHIPPED | OUTPATIENT
Start: 2023-09-27

## 2023-10-02 ENCOUNTER — PRIOR AUTHORIZATION (OUTPATIENT)
Dept: FAMILY MEDICINE CLINIC | Facility: CLINIC | Age: 33
End: 2023-10-02
Payer: MEDICAID

## 2023-10-03 NOTE — TELEPHONE ENCOUNTER
N/Aon October 2  Prior Authorization is not required for this medication dosage form and strength at the quantity and days supply requested. This medication does not require a prior authorization because it is a covered benefit. Please have the pharmacy bill for the preferred product listed ABOVE in this letter.

## 2023-10-04 ENCOUNTER — OFFICE VISIT (OUTPATIENT)
Dept: FAMILY MEDICINE CLINIC | Facility: CLINIC | Age: 33
End: 2023-10-04
Payer: MEDICAID

## 2023-10-04 ENCOUNTER — HOSPITAL ENCOUNTER (OUTPATIENT)
Dept: GENERAL RADIOLOGY | Facility: HOSPITAL | Age: 33
Discharge: HOME | End: 2023-10-04
Admitting: FAMILY MEDICINE
Payer: MEDICAID

## 2023-10-04 VITALS
SYSTOLIC BLOOD PRESSURE: 110 MMHG | WEIGHT: 168.6 LBS | BODY MASS INDEX: 24.97 KG/M2 | HEIGHT: 69 IN | DIASTOLIC BLOOD PRESSURE: 66 MMHG

## 2023-10-04 DIAGNOSIS — F90.1 ATTENTION DEFICIT HYPERACTIVITY DISORDER (ADHD), PREDOMINANTLY HYPERACTIVE TYPE: ICD-10-CM

## 2023-10-04 DIAGNOSIS — E53.8 B12 DEFICIENCY: ICD-10-CM

## 2023-10-04 DIAGNOSIS — E55.9 VITAMIN D DEFICIENCY: ICD-10-CM

## 2023-10-04 DIAGNOSIS — Z23 FLU VACCINE NEED: Primary | ICD-10-CM

## 2023-10-04 DIAGNOSIS — Z13.220 SCREENING FOR HYPERLIPIDEMIA: ICD-10-CM

## 2023-10-04 DIAGNOSIS — M25.572 ACUTE LEFT ANKLE PAIN: ICD-10-CM

## 2023-10-04 PROCEDURE — 73610 X-RAY EXAM OF ANKLE: CPT

## 2023-10-04 RX ORDER — LANOLIN ALCOHOL/MO/W.PET/CERES
1000 CREAM (GRAM) TOPICAL DAILY
Qty: 90 TABLET | Refills: 3 | Status: SHIPPED | OUTPATIENT
Start: 2023-10-04

## 2023-10-04 RX ORDER — ERGOCALCIFEROL 1.25 MG/1
50000 CAPSULE ORAL WEEKLY
Qty: 12 CAPSULE | Refills: 0 | Status: SHIPPED | OUTPATIENT
Start: 2023-10-04 | End: 2023-12-21

## 2023-10-04 NOTE — PROGRESS NOTES
Established Patient Office Visit      Patient Name: Mayo Landers  : 1990   MRN: [id number]   Care Team: Patient Care Team:  Francisco Javier Miller DO as PCP - General (Family Medicine)  Provider, No Known    Chief Complaint:    Chief Complaint   Patient presents with    ADHD     3 month follow-up.  Pt also co left ankle pain (locking)       History of Present Illness: Mayo Landers is a 33 y.o. male who is here today for routine controlled substance monitoring.    HPI    Mayo Galdamezresents for follow-up on ADHD.  Due to being on a long-term controlled substance/stimulant, they are seen every 3 months for monitoring and reevaluation. he reports that his medication is doing well at the current dosage. he denies any new side effects including unintentional weight loss, anorexia, heart palpitations, or increased anxiety.  They feel that they are still getting substantial benefit from active treatment.    Left ankle pain, locking in plantarflexion after sitting for prolonged periods. First happened a few months ago, then went a while without happening, then the last 2 weeks has happened twice. Takes about 6 hours    The following portions of the patient's history were reviewed and updated as appropriate: allergies, current medications, past family history, past medical history, past social history, past surgical history and problem list.    Subjective      Review of Systems:   Review of Systems - See HPI    Past Medical History:   Past Medical History:   Diagnosis Date    Chronic bronchitis     DUe to vaping previously, no medications, no chronic issues    Fatty liver     Fracture        Past Surgical History:   Past Surgical History:   Procedure Laterality Date    EAR TUBES         Family History:   Family History   Problem Relation Age of Onset    Breast cancer Mother     Hyperlipidemia Father     Hypertension Father     No Known Problems Sister     Obesity Brother     Klinefelter's syndrome Brother      "Breast cancer Maternal Grandmother        Social History:   Social History     Socioeconomic History    Marital status: Single   Tobacco Use    Smoking status: Every Day     Packs/day: 0.00     Years: 2.00     Additional pack years: 0.00     Total pack years: 0.00     Types: Electronic Cigarette, Cigarettes    Smokeless tobacco: Never   Vaping Use    Vaping Use: Every day    Substances: Nicotine    Devices: Disposable    Passive vaping exposure: Yes   Substance and Sexual Activity    Alcohol use: Yes     Comment: Ocassionally    Drug use: No       Tobacco History:   Social History     Tobacco Use   Smoking Status Every Day    Packs/day: 0.00    Years: 2.00    Additional pack years: 0.00    Total pack years: 0.00    Types: Electronic Cigarette, Cigarettes   Smokeless Tobacco Never       Medications:     Current Outpatient Medications:     amphetamine-dextroamphetamine (Adderall) 7.5 MG tablet, 1 tab PO every afternoon, Disp: 30 tablet, Rfl: 0    lisdexamfetamine (Vyvanse) 30 MG capsule, Take 1 capsule by mouth Every Morning, Disp: 30 capsule, Rfl: 0    nicotine (Nicoderm CQ) 14 MG/24HR patch, Place 1 patch on the skin as directed by provider Daily. Start after 21 finishes, Disp: 28 each, Rfl: 0    nicotine (Nicoderm CQ) 7 MG/24HR patch, Place 1 patch on the skin as directed by provider Daily., Disp: 30 each, Rfl: 2    vitamin D (ERGOCALCIFEROL) 1.25 MG (11584 UT) capsule capsule, Take 1 capsule by mouth 1 (One) Time Per Week for 12 doses. Call to recheck Vitamin D level when finished., Disp: 12 capsule, Rfl: 0    vitamin B-12 (CYANOCOBALAMIN) 1000 MCG tablet, Take 1 tablet by mouth Daily., Disp: 90 tablet, Rfl: 3    Allergies:   No Known Allergies    Objective   Objective     Physical Exam:  Vital Signs:   Vitals:    10/04/23 1103   BP: 110/66   BP Location: Left arm   Patient Position: Sitting   Cuff Size: Adult   Weight: 76.5 kg (168 lb 9.6 oz)   Height: 175.3 cm (69.02\")     Body mass index is 24.88 kg/m². "     Physical Exam  Nursing note reviewed  Const: NAD, A&Ox4, Pleasant, Cooperative  Eyes: EOMI, no conjunctivitis  ENT: No nasal discharge present, neck supple  Cardiac: Regular rate and rhythm, no cyanosis  Psych: Appropriate mood and behavior.  PHQ-9 Depression Screening           Assessment & Plan   Assessment / Plan      Assessment/Plan:   Problems Addressed This Visit  Diagnoses and all orders for this visit:    1. Flu vaccine need (Primary)  -     Fluzone >6 Months (9568-1553)    2. Attention deficit hyperactivity disorder (ADHD), predominantly hyperactive type    3. Acute left ankle pain  Comments:  DDx includes posterior ankle impingement, PT tendinitis, chronic capsulitis  Orders:  -     XR Ankle 3+ View Left; Future    4. Vitamin D deficiency  Comments:  Severe, vitamin D level at 12.  Recommend weekly vitamin D 50,000 units  Orders:  -     vitamin D (ERGOCALCIFEROL) 1.25 MG (68939 UT) capsule capsule; Take 1 capsule by mouth 1 (One) Time Per Week for 12 doses. Call to recheck Vitamin D level when finished.  Dispense: 12 capsule; Refill: 0  -     Vitamin D,25-Hydroxy; Future    5. B12 deficiency  -     Vitamin B12; Future  -     vitamin B-12 (CYANOCOBALAMIN) 1000 MCG tablet; Take 1 tablet by mouth Daily.  Dispense: 90 tablet; Refill: 3    6. Screening for hyperlipidemia  -     Comprehensive Metabolic Panel; Future  -     Lipid Panel; Future      Problem List Items Addressed This Visit          Mental Health    Attention deficit hyperactivity disorder (ADHD), predominantly hyperactive type    Overview     Formulary change from Adderall XR 10mg to Vyvanse 30mg daily in August 2021          Other Visit Diagnoses       Flu vaccine need    -  Primary    Relevant Orders    Fluzone >6 Months (8416-7970) (Completed)    Acute left ankle pain        DDx includes posterior ankle impingement, PT tendinitis, chronic capsulitis    Relevant Orders    XR Ankle 3+ View Left (Completed)    Vitamin D deficiency         Severe, vitamin D level at 12.  Recommend weekly vitamin D 50,000 units    Relevant Medications    vitamin D (ERGOCALCIFEROL) 1.25 MG (41747 UT) capsule capsule    Other Relevant Orders    Vitamin D,25-Hydroxy    B12 deficiency        Relevant Medications    vitamin B-12 (CYANOCOBALAMIN) 1000 MCG tablet    Other Relevant Orders    Vitamin B12    Screening for hyperlipidemia        Relevant Orders    Comprehensive Metabolic Panel    Lipid Panel            Chart reviewed for controlled substance agreement and most recent urine drug screen.  MAYURI/PDMP reviewed and appropriate. No signs of abuse or diversion, no new concerns from patient.  Refills provided as indicated or previously completed.  -Anxiety rating scale (HAM-A), PHQ-9, and adult ADHD self-report scale updated and reviewed yearly  -Patient counseled on adjuvant nonpharmacologic treatments for ADHD including exercise, low sugar diet, Rhodiola supplement OTC, CBT/behavioral health referral, meditation, and adequate sleep.    There are no Patient Instructions on file for this visit.    Follow Up:   Return in about 3 months (around 1/4/2024) for Annual, (fasting blood work 1 week prior to appt).    DO [address]ST [address]  [facility]  [address]  [facility] [place] [address]  Fax [phone]  Phone [phone]

## 2023-10-17 ENCOUNTER — TELEPHONE (OUTPATIENT)
Dept: FAMILY MEDICINE CLINIC | Facility: CLINIC | Age: 33
End: 2023-10-17

## 2023-10-17 NOTE — TELEPHONE ENCOUNTER
Caller: Mayo Landers    Relationship to patient: Self    Best call back number: [phone]     Chief complaint: NEW JOB NEEDS ME TO GET A HEP A SHOT    Type of visit: NURSE     Requested date: ASAP

## 2023-10-23 DIAGNOSIS — F90.1 ATTENTION DEFICIT HYPERACTIVITY DISORDER (ADHD), PREDOMINANTLY HYPERACTIVE TYPE: ICD-10-CM

## 2023-10-23 NOTE — TELEPHONE ENCOUNTER
Caller: Mayo Landers    Relationship: Self    Best call back number: [phone]    Requested Prescriptions:   Requested Prescriptions     Pending Prescriptions Disp Refills    lisdexamfetamine (Vyvanse) 30 MG capsule 30 capsule 0     Sig: Take 1 capsule by mouth Every Morning    amphetamine-dextroamphetamine (Adderall) 7.5 MG tablet 30 tablet 0     Si tab PO every afternoon        Pharmacy where request should be sent: [facility][id number] [address] [phone] [facility] [phone] FX     Last office visit with prescribing clinician: 10/4/2023   Last telemedicine visit with prescribing clinician: Visit date not found   Next office visit with prescribing clinician: 2024     Additional details provided by patient: PT HAS 5 LEFT AND HAS CHANGED PHARMACYS    Does the patient have less than a 3 day supply:  [x] Yes  [] No    Would you like a call back once the refill request has been completed: [x] Yes [] No    If the office needs to give you a call back, can they leave a voicemail: [] Yes [] No    Kali Ramos Rep   10/23/23 14:56 EDT

## 2023-10-23 NOTE — TELEPHONE ENCOUNTER
Rx Refill Note  Requested Prescriptions     Pending Prescriptions Disp Refills    lisdexamfetamine (Vyvanse) 30 MG capsule 30 capsule 0     Sig: Take 1 capsule by mouth Every Morning    amphetamine-dextroamphetamine (Adderall) 7.5 MG tablet 30 tablet 0     Si tab PO every afternoon      Last office visit with prescribing clinician: 10/4/2023   Last telemedicine visit with prescribing clinician: Visit date not found   Next office visit with prescribing clinician: 2024                         Would you like a call back once the refill request has been completed: [] Yes [] No    If the office needs to give you a call back, can they leave a voicemail: [] Yes [] No    Gerald Maldonado MA  10/23/23, 15:59 EDT

## 2023-10-24 RX ORDER — LISDEXAMFETAMINE DIMESYLATE CAPSULES 30 MG/1
30 CAPSULE ORAL EVERY MORNING
Qty: 30 CAPSULE | Refills: 0 | Status: SHIPPED | OUTPATIENT
Start: 2023-10-24

## 2023-10-24 RX ORDER — DEXTROAMPHETAMINE SACCHARATE, AMPHETAMINE ASPARTATE, DEXTROAMPHETAMINE SULFATE AND AMPHETAMINE SULFATE 1.875; 1.875; 1.875; 1.875 MG/1; MG/1; MG/1; MG/1
TABLET ORAL
Qty: 30 TABLET | Refills: 0 | Status: SHIPPED | OUTPATIENT
Start: 2023-10-24

## 2023-11-07 ENCOUNTER — PROCEDURE VISIT (OUTPATIENT)
Dept: FAMILY MEDICINE CLINIC | Facility: CLINIC | Age: 33
End: 2023-11-07
Payer: MEDICAID

## 2023-11-07 VITALS
HEIGHT: 69 IN | BODY MASS INDEX: 24.88 KG/M2 | DIASTOLIC BLOOD PRESSURE: 78 MMHG | SYSTOLIC BLOOD PRESSURE: 110 MMHG | WEIGHT: 168 LBS

## 2023-11-07 DIAGNOSIS — T81.89XA RETAINED SUTURE, INITIAL ENCOUNTER: ICD-10-CM

## 2023-11-07 DIAGNOSIS — Z48.02 VISIT FOR SUTURE REMOVAL: Primary | ICD-10-CM

## 2023-11-07 DIAGNOSIS — Z18.9 RETAINED SUTURE, INITIAL ENCOUNTER: ICD-10-CM

## 2023-11-07 RX ORDER — SULFAMETHOXAZOLE AND TRIMETHOPRIM 800; 160 MG/1; MG/1
1 TABLET ORAL 2 TIMES DAILY
Qty: 14 TABLET | Refills: 0 | Status: SHIPPED | OUTPATIENT
Start: 2023-11-07 | End: 2023-11-14

## 2023-11-07 NOTE — LETTER
November 7, 2023     Patient: Mayo Landers   YOB: 1990   Date of Visit: 11/7/2023       To Whom It May Concern:    It is my medical opinion that Mayo Landers may return to light duty immediately with the following restrictions: no full submersion of right hand until 11/15/23. Must keep covered with bandage and wear a glove at all times .           Sincerely,        Francisco Javier Miller,     CC:   No Recipients

## 2023-11-27 DIAGNOSIS — F90.1 ATTENTION DEFICIT HYPERACTIVITY DISORDER (ADHD), PREDOMINANTLY HYPERACTIVE TYPE: ICD-10-CM

## 2023-11-27 RX ORDER — LISDEXAMFETAMINE DIMESYLATE CAPSULES 30 MG/1
30 CAPSULE ORAL EVERY MORNING
Qty: 30 CAPSULE | Refills: 0 | Status: SHIPPED | OUTPATIENT
Start: 2023-11-27

## 2023-11-27 RX ORDER — DEXTROAMPHETAMINE SACCHARATE, AMPHETAMINE ASPARTATE, DEXTROAMPHETAMINE SULFATE AND AMPHETAMINE SULFATE 1.875; 1.875; 1.875; 1.875 MG/1; MG/1; MG/1; MG/1
TABLET ORAL
Qty: 30 TABLET | Refills: 0 | Status: SHIPPED | OUTPATIENT
Start: 2023-11-27

## 2023-11-27 NOTE — TELEPHONE ENCOUNTER
Caller: Mayo Landers    Relationship: Self    Best call back number: [phone]     Requested Prescriptions:   Requested Prescriptions     Pending Prescriptions Disp Refills    lisdexamfetamine (Vyvanse) 30 MG capsule 30 capsule 0     Sig: Take 1 capsule by mouth Every Morning    amphetamine-dextroamphetamine (Adderall) 7.5 MG tablet 30 tablet 0     Si tab PO every afternoon        Pharmacy where request should be sent: [facility] #[id number] [address] [phone] [facility] [phone] FX     Last office visit with prescribing clinician: 10/4/2023   Last telemedicine visit with prescribing clinician: Visit date not found   Next office visit with prescribing clinician: 2024     Additional details provided by patient: OUT OF MEDICATION     Does the patient have less than a 3 day supply:  [x] Yes  [] No    Would you like a call back once the refill request has been completed: [] Yes [x] No    If the office needs to give you a call back, can they leave a voicemail: [] Yes [x] No    Kali Arauz Rep   23 12:56 EST

## 2023-11-27 NOTE — PROGRESS NOTES
Established Patient Office Visit      Patient Name: Mayo Landers  : 1990   MRN: [id number]   Care Team: Patient Care Team:  Francisco Javier Miller DO as PCP - General (Family Medicine)  Provider, No Known    Chief Complaint:    Chief Complaint   Patient presents with    Suture / Staple Removal     Suture removal from left forefinger       History of Present Illness: Mayo Landers is a 33 y.o. male who is here today for chief complaint.    HPI    Had sutures placed on left fourth finger ER 7 days ago    This patient is accompanied by their self who contributes to the history of their care.    The following portions of the patient's history were reviewed and updated as appropriate: allergies, current medications, past family history, past medical history, past social history, past surgical history and problem list.    Subjective      Review of Systems:   Review of Systems - See HPI    Past Medical History:   Past Medical History:   Diagnosis Date    Chronic bronchitis     DUe to vaping previously, no medications, no chronic issues    Fatty liver     Fracture        Past Surgical History:   Past Surgical History:   Procedure Laterality Date    EAR TUBES         Family History:   Family History   Problem Relation Age of Onset    Breast cancer Mother     Hyperlipidemia Father     Hypertension Father     No Known Problems Sister     Obesity Brother     Klinefelter's syndrome Brother     Breast cancer Maternal Grandmother        Social History:   Social History     Socioeconomic History    Marital status: Single   Tobacco Use    Smoking status: Every Day     Packs/day: 0.00     Years: 2.00     Additional pack years: 0.00     Total pack years: 0.00     Types: Electronic Cigarette, Cigarettes    Smokeless tobacco: Never   Vaping Use    Vaping Use: Every day    Substances: Nicotine    Devices: Disposable    Passive vaping exposure: Yes   Substance and Sexual Activity    Alcohol use: Yes     Comment: Ocassionally     "Drug use: No       Tobacco History:   Social History     Tobacco Use   Smoking Status Every Day    Packs/day: 0.00    Years: 2.00    Additional pack years: 0.00    Total pack years: 0.00    Types: Electronic Cigarette, Cigarettes   Smokeless Tobacco Never       Medications:     Current Outpatient Medications:     amphetamine-dextroamphetamine (Adderall) 7.5 MG tablet, 1 tab PO every afternoon, Disp: 30 tablet, Rfl: 0    lisdexamfetamine (Vyvanse) 30 MG capsule, Take 1 capsule by mouth Every Morning, Disp: 30 capsule, Rfl: 0    nicotine (Nicoderm CQ) 14 MG/24HR patch, Place 1 patch on the skin as directed by provider Daily. Start after 21 finishes, Disp: 28 each, Rfl: 0    nicotine (Nicoderm CQ) 7 MG/24HR patch, Place 1 patch on the skin as directed by provider Daily., Disp: 30 each, Rfl: 2    vitamin B-12 (CYANOCOBALAMIN) 1000 MCG tablet, Take 1 tablet by mouth Daily., Disp: 90 tablet, Rfl: 3    vitamin D (ERGOCALCIFEROL) 1.25 MG (14931 UT) capsule capsule, Take 1 capsule by mouth 1 (One) Time Per Week for 12 doses. Call to recheck Vitamin D level when finished., Disp: 12 capsule, Rfl: 0    Allergies:   No Known Allergies    Objective   Objective     Physical Exam:  Vital Signs:   Vitals:    11/07/23 1028   BP: 110/78   BP Location: Left arm   Patient Position: Sitting   Cuff Size: Adult   Weight: 76.2 kg (168 lb)   Height: 175.3 cm (69.02\")     Body mass index is 24.79 kg/m².     Physical Exam  Nursing note reviewed  Const: NAD, A&Ox4, Pleasant, Cooperative  Eyes: EOMI, no conjunctivitis  ENT: No nasal discharge present, neck supple  Cardiac: Regular rate and rhythm, no cyanosis  Resp: Respiratory rate within normal limits, no increased work of breathing, no audible wheezing or retractions noted  GI: No distention or ascites  MSK: Motor and sensation grossly intact in bilateral upper extremities  Neurologic: CN II-XII grossly intact  Psych: Appropriate mood and behavior.  Skin: Warm, dry  Procedures/Radiology "     Procedures  No radiology results for the last 7 days   Sutures removed with difficulty, 1 suture was entirely embedded within the wound and unable to be removed.  Assessment & Plan   Assessment / Plan      Assessment/Plan:   Problems Addressed This Visit  Diagnoses and all orders for this visit:    1. Visit for suture removal (Primary)    2. Retained suture, initial encounter  -     sulfamethoxazole-trimethoprim (Bactrim DS) 800-160 MG per tablet; Take 1 tablet by mouth 2 (Two) Times a Day for 7 days.  Dispense: 14 tablet; Refill: 0      Problem List Items Addressed This Visit    None  Visit Diagnoses       Visit for suture removal    -  Primary    Retained suture, initial encounter              We will prescribe prophylactic antibiotic given retained suture, Bactrim DS twice daily for 7 days    There are no Patient Instructions on file for this visit.    Follow Up:   No follow-ups on file.        DO [address]  [facility]  [address]  [facility] [address]  Fax [phone]  Phone [phone]

## 2023-11-30 ENCOUNTER — PRIOR AUTHORIZATION (OUTPATIENT)
Dept: FAMILY MEDICINE CLINIC | Facility: CLINIC | Age: 33
End: 2023-11-30
Payer: MEDICAID

## 2023-11-30 NOTE — TELEPHONE ENCOUNTER
(Key: [id number]) - [id number]  Lisdexamfetamine Dimesylate 30MG capsules  Status: sent to plan  Created: November 27th, 2023 [phone]  Sent: November 30th, 2023

## 2024-01-02 ENCOUNTER — TELEPHONE (OUTPATIENT)
Dept: FAMILY MEDICINE CLINIC | Facility: CLINIC | Age: 34
End: 2024-01-02
Payer: MEDICAID

## 2024-01-02 DIAGNOSIS — F90.1 ATTENTION DEFICIT HYPERACTIVITY DISORDER (ADHD), PREDOMINANTLY HYPERACTIVE TYPE: ICD-10-CM

## 2024-01-02 NOTE — TELEPHONE ENCOUNTER
Rx Refill Note  Requested Prescriptions     Pending Prescriptions Disp Refills    amphetamine-dextroamphetamine (Adderall) 7.5 MG tablet 30 tablet 0     Si tab PO every afternoon    lisdexamfetamine (Vyvanse) 30 MG capsule 30 capsule 0     Sig: Take 1 capsule by mouth Every Morning      Last office visit with prescribing clinician: 10/4/2023   Last telemedicine visit with prescribing clinician: Visit date not found   Next office visit with prescribing clinician: 2024

## 2024-01-02 NOTE — TELEPHONE ENCOUNTER
Caller: Mayo Landers    Relationship: Self    Best call back number: [phone]     What medication are you requesting: STRONGEST LEVEL OF NICOTINE PATCH TO RESTART CESSATION PROCESS    What are your current symptoms: SMOKING    If a prescription is needed, what is your preferred pharmacy and phone number: [facility] #[id number] - [address] [phone] [facility] [phone] FX

## 2024-01-02 NOTE — TELEPHONE ENCOUNTER
Caller: Mayo Landers    Relationship: Self    Best call back number: [phone]     Requested Prescriptions:   Requested Prescriptions     Pending Prescriptions Disp Refills    amphetamine-dextroamphetamine (Adderall) 7.5 MG tablet 30 tablet 0     Si tab PO every afternoon    lisdexamfetamine (Vyvanse) 30 MG capsule 30 capsule 0     Sig: Take 1 capsule by mouth Every Morning        [facility] where [facility] be sent: [id number] [address] [phone] [facility] [phone] FX     Last office visit with prescribing clinician: 10/4/2023   Last telemedicine visit with prescribing clinician: Visit date not found   Next office visit with prescribing clinician: 2024     Does the patient have less than a 3 day supply:  [x] Yes  [] No    Would you like a call back once the refill request has been completed: [] Yes [x] No    If the office needs to give you a call back, can they leave a voicemail: [] Yes [x] No    Kali Adamson Rep   24 15:51 EST

## 2024-01-03 RX ORDER — DEXTROAMPHETAMINE SACCHARATE, AMPHETAMINE ASPARTATE, DEXTROAMPHETAMINE SULFATE AND AMPHETAMINE SULFATE 1.875; 1.875; 1.875; 1.875 MG/1; MG/1; MG/1; MG/1
TABLET ORAL
Qty: 30 TABLET | Refills: 0 | Status: SHIPPED | OUTPATIENT
Start: 2024-01-03

## 2024-01-03 RX ORDER — LISDEXAMFETAMINE DIMESYLATE CAPSULES 30 MG/1
30 CAPSULE ORAL EVERY MORNING
Qty: 30 CAPSULE | Refills: 0 | Status: SHIPPED | OUTPATIENT
Start: 2024-01-03

## 2024-01-03 RX ORDER — NICOTINE 21 MG/24HR
1 PATCH, TRANSDERMAL 24 HOURS TRANSDERMAL EVERY 24 HOURS
Qty: 28 EACH | Refills: 1 | Status: SHIPPED | OUTPATIENT
Start: 2024-01-03 | End: 2024-02-05 | Stop reason: SDUPTHER

## 2024-01-29 NOTE — TELEPHONE ENCOUNTER
Caller: LeesaMayo fowler    Relationship: Self    Best call back number:     Requested Prescriptions:   Requested Prescriptions     Pending Prescriptions Disp Refills   • lisdexamfetamine (Vyvanse) 30 MG capsule 30 capsule 0     Sig: Take 1 capsule by mouth Every Morning   • amphetamine-dextroamphetamine (Adderall) 7.5 MG tablet 30 tablet 0     Si tab PO every afternoon        [facility] be sent[id number] [address] [facility] [address] [facility] [phone] [facility] [phone] FX     Last office visit with prescribing clinician: 2023   Last telemedicine visit with prescribing clinician: 2023   Next office visit with prescribing clinician: 2023     Additional details provided by patient: PATIENT HAS 3 DAYS LEFT    Does the patient have less than a 3 day supply:  [x] Yes  [] No      Kali Ivey   23 12:23 EDT            Patient requests all Lab, Cardiology, and Radiology Results on their Discharge Instructions

## 2024-02-04 DIAGNOSIS — E55.9 VITAMIN D DEFICIENCY: ICD-10-CM

## 2024-02-05 DIAGNOSIS — F90.1 ATTENTION DEFICIT HYPERACTIVITY DISORDER (ADHD), PREDOMINANTLY HYPERACTIVE TYPE: ICD-10-CM

## 2024-02-05 RX ORDER — DEXTROAMPHETAMINE SACCHARATE, AMPHETAMINE ASPARTATE, DEXTROAMPHETAMINE SULFATE AND AMPHETAMINE SULFATE 1.875; 1.875; 1.875; 1.875 MG/1; MG/1; MG/1; MG/1
TABLET ORAL
Qty: 30 TABLET | Refills: 0 | OUTPATIENT
Start: 2024-02-05

## 2024-02-05 RX ORDER — LISDEXAMFETAMINE DIMESYLATE CAPSULES 30 MG/1
30 CAPSULE ORAL EVERY MORNING
Qty: 30 CAPSULE | Refills: 0 | OUTPATIENT
Start: 2024-02-05

## 2024-02-05 RX ORDER — NICOTINE 21 MG/24HR
1 PATCH, TRANSDERMAL 24 HOURS TRANSDERMAL EVERY 24 HOURS
Qty: 28 EACH | Refills: 1 | Status: SHIPPED | OUTPATIENT
Start: 2024-02-05

## 2024-02-05 RX ORDER — ERGOCALCIFEROL 1.25 MG/1
CAPSULE ORAL
Qty: 12 CAPSULE | Refills: 0 | Status: SHIPPED | OUTPATIENT
Start: 2024-02-05

## 2024-02-05 NOTE — TELEPHONE ENCOUNTER
Pt has not been seen since October, missed 3 month follow up in January. Needs appt before controlled medication can be refilled.

## 2024-02-05 NOTE — TELEPHONE ENCOUNTER
Caller: Mayo Landers    Relationship: Self    Best call back number: [phone]     Requested Prescriptions:   Requested Prescriptions     Pending Prescriptions Disp Refills    lisdexamfetamine (Vyvanse) 30 MG capsule 30 capsule 0     Sig: Take 1 capsule by mouth Every Morning    amphetamine-dextroamphetamine (Adderall) 7.5 MG tablet 30 tablet 0     Si tab PO every afternoon    nicotine (Nicoderm CQ) 21 MG/24HR patch 28 each 1     Sig: Place 1 patch on the skin as directed by provider Daily.        Pharmacy where request should be sent: [id number] - [address] [phone] [facility] [phone] FX     Last office visit with prescribing clinician: 10/4/2023   Last telemedicine visit with prescribing clinician: Visit date not found   Next office visit with prescribing clinician: 3/26/2024     Additional details provided by patient: OUT OF MEDICATION     Does the patient have less than a 3 day supply:  [x] Yes  [] No    Would you like a call back once the refill request has been completed: [] Yes [x] No    If the office needs to give you a call back, can they leave a voicemail: [] Yes [x] No    Kali Pickett Rep   24 12:39 EST

## 2024-02-05 NOTE — TELEPHONE ENCOUNTER
Rx Refill Note  Requested Prescriptions     Pending Prescriptions Disp Refills    vitamin D (ERGOCALCIFEROL) 1.25 MG (80025 UT) capsule capsule [Pharmacy Med Name: VITAMIN D2 50,000IU (ERGO) CAP RX] 12 capsule 0     Sig: TAKE 1 CAPSULE BY MOUTH 1 TIME EVERY WEEK FOR 12 DOSES      Last office visit with prescribing clinician: 10/4/2023   Last telemedicine visit with prescribing clinician: Visit date not found   Next office visit with prescribing clinician: 3/26/2024                         Would you like a call back once the refill request has been completed: [] Yes [] No    If the office needs to give you a call back, can they leave a voicemail: [] Yes [] No    Chikis Patel MA  02/05/24, 08:55 EST

## 2024-02-08 ENCOUNTER — LAB (OUTPATIENT)
Dept: LAB | Facility: HOSPITAL | Age: 34
End: 2024-02-08
Payer: MEDICAID

## 2024-02-08 ENCOUNTER — OFFICE VISIT (OUTPATIENT)
Dept: FAMILY MEDICINE CLINIC | Facility: CLINIC | Age: 34
End: 2024-02-08
Payer: MEDICAID

## 2024-02-08 VITALS
BODY MASS INDEX: 24.88 KG/M2 | SYSTOLIC BLOOD PRESSURE: 120 MMHG | OXYGEN SATURATION: 97 % | HEART RATE: 92 BPM | WEIGHT: 168 LBS | HEIGHT: 69 IN | DIASTOLIC BLOOD PRESSURE: 72 MMHG

## 2024-02-08 DIAGNOSIS — F90.1 ATTENTION DEFICIT HYPERACTIVITY DISORDER (ADHD), PREDOMINANTLY HYPERACTIVE TYPE: ICD-10-CM

## 2024-02-08 DIAGNOSIS — J06.9 UPPER RESPIRATORY TRACT INFECTION, UNSPECIFIED TYPE: ICD-10-CM

## 2024-02-08 DIAGNOSIS — E53.8 B12 DEFICIENCY: ICD-10-CM

## 2024-02-08 DIAGNOSIS — Z13.220 SCREENING FOR HYPERLIPIDEMIA: ICD-10-CM

## 2024-02-08 DIAGNOSIS — I88.9 LYMPHADENITIS: ICD-10-CM

## 2024-02-08 DIAGNOSIS — H66.92 LEFT OTITIS MEDIA, UNSPECIFIED OTITIS MEDIA TYPE: Primary | ICD-10-CM

## 2024-02-08 DIAGNOSIS — E55.9 VITAMIN D DEFICIENCY: ICD-10-CM

## 2024-02-08 LAB
25(OH)D3 SERPL-MCNC: 26.4 NG/ML (ref 30–100)
ALBUMIN SERPL-MCNC: 5 G/DL (ref 3.5–5.2)
ALBUMIN/GLOB SERPL: 1.7 G/DL
ALP SERPL-CCNC: 87 U/L (ref 39–117)
ALT SERPL W P-5'-P-CCNC: 33 U/L (ref 1–41)
ANION GAP SERPL CALCULATED.3IONS-SCNC: 12 MMOL/L (ref 5–15)
AST SERPL-CCNC: 22 U/L (ref 1–40)
BILIRUB SERPL-MCNC: 0.6 MG/DL (ref 0–1.2)
BUN SERPL-MCNC: 8 MG/DL (ref 6–20)
BUN/CREAT SERPL: 8.2 (ref 7–25)
CALCIUM SPEC-SCNC: 10.3 MG/DL (ref 8.6–10.5)
CHLORIDE SERPL-SCNC: 100 MMOL/L (ref 98–107)
CHOLEST SERPL-MCNC: 159 MG/DL (ref 0–200)
CO2 SERPL-SCNC: 28 MMOL/L (ref 22–29)
CREAT SERPL-MCNC: 0.98 MG/DL (ref 0.76–1.27)
EGFRCR SERPLBLD CKD-EPI 2021: 105.7 ML/MIN/1.73
EXPIRATION DATE: NORMAL
EXPIRATION DATE: NORMAL
FLUAV AG UPPER RESP QL IA.RAPID: NOT DETECTED
FLUBV AG UPPER RESP QL IA.RAPID: NOT DETECTED
GLOBULIN UR ELPH-MCNC: 3 GM/DL
GLUCOSE SERPL-MCNC: 103 MG/DL (ref 65–99)
HDLC SERPL-MCNC: 46 MG/DL (ref 40–60)
INTERNAL CONTROL: NORMAL
INTERNAL CONTROL: NORMAL
LDLC SERPL CALC-MCNC: 99 MG/DL (ref 0–100)
LDLC/HDLC SERPL: 2.13 {RATIO}
Lab: NORMAL
Lab: NORMAL
POTASSIUM SERPL-SCNC: 4.2 MMOL/L (ref 3.5–5.2)
PROT SERPL-MCNC: 8 G/DL (ref 6–8.5)
S PYO AG THROAT QL: NEGATIVE
SARS-COV-2 AG UPPER RESP QL IA.RAPID: NOT DETECTED
SODIUM SERPL-SCNC: 140 MMOL/L (ref 136–145)
TRIGL SERPL-MCNC: 75 MG/DL (ref 0–150)
VIT B12 BLD-MCNC: 643 PG/ML (ref 211–946)
VLDLC SERPL-MCNC: 14 MG/DL (ref 5–40)

## 2024-02-08 PROCEDURE — 82607 VITAMIN B-12: CPT

## 2024-02-08 PROCEDURE — 80053 COMPREHEN METABOLIC PANEL: CPT

## 2024-02-08 PROCEDURE — 86665 EPSTEIN-BARR CAPSID VCA: CPT

## 2024-02-08 PROCEDURE — 87880 STREP A ASSAY W/OPTIC: CPT | Performed by: PHYSICIAN ASSISTANT

## 2024-02-08 PROCEDURE — 86664 EPSTEIN-BARR NUCLEAR ANTIGEN: CPT

## 2024-02-08 PROCEDURE — 99214 OFFICE O/P EST MOD 30 MIN: CPT | Performed by: PHYSICIAN ASSISTANT

## 2024-02-08 PROCEDURE — 80061 LIPID PANEL: CPT

## 2024-02-08 PROCEDURE — 87428 SARSCOV & INF VIR A&B AG IA: CPT | Performed by: PHYSICIAN ASSISTANT

## 2024-02-08 PROCEDURE — 36415 COLL VENOUS BLD VENIPUNCTURE: CPT

## 2024-02-08 PROCEDURE — 82306 VITAMIN D 25 HYDROXY: CPT

## 2024-02-08 RX ORDER — AMOXICILLIN AND CLAVULANATE POTASSIUM 875; 125 MG/1; MG/1
1 TABLET, FILM COATED ORAL 2 TIMES DAILY
Qty: 14 TABLET | Refills: 0 | Status: SHIPPED | OUTPATIENT
Start: 2024-02-08

## 2024-02-08 RX ORDER — DEXTROAMPHETAMINE SACCHARATE, AMPHETAMINE ASPARTATE, DEXTROAMPHETAMINE SULFATE AND AMPHETAMINE SULFATE 1.875; 1.875; 1.875; 1.875 MG/1; MG/1; MG/1; MG/1
TABLET ORAL
Qty: 30 TABLET | Refills: 0 | Status: SHIPPED | OUTPATIENT
Start: 2024-02-08

## 2024-02-08 RX ORDER — ALBUTEROL SULFATE 90 UG/1
2 AEROSOL, METERED RESPIRATORY (INHALATION) EVERY 4 HOURS PRN
Qty: 8 G | Refills: 0 | Status: SHIPPED | OUTPATIENT
Start: 2024-02-08

## 2024-02-08 RX ORDER — LISDEXAMFETAMINE DIMESYLATE CAPSULES 30 MG/1
30 CAPSULE ORAL EVERY MORNING
Qty: 30 CAPSULE | Refills: 0 | Status: SHIPPED | OUTPATIENT
Start: 2024-02-08

## 2024-02-08 NOTE — PROGRESS NOTES
Chief Complaint   Patient presents with    Cough     Fever, chills, weakness, SOB, body pain, drainage, green sputum, loss of appetite about a week   Needs medication refills  Discuss adhd refill       HPI     Mayo Landers is a 33 y.o. male who is here for 3 month follow-up of ADHD.     Patient c/o 4-5 day history of cough, dark green sputum, fatigue, chills, and body aches. Feels like he cannot take a full breath. Reduced appetite. Took some mucinex. He thinks coworkers have had COVID. He works as a  at a restaurant.     Here today for ADHD follow-up.   He takes Vyvanse at 6-7 AM and Adderall IR at 5-6 PM. Works about 16 hour days. This is working well for focus. Some mild side-effects, dry mouth, insomnia if he takes medication too late but otherwise he is tolerating this well.     Past Medical History:   Diagnosis Date    Chronic bronchitis     DUe to vaping previously, no medications, no chronic issues    Fatty liver     Fracture        Past Surgical History:   Procedure Laterality Date    EAR TUBES         Family History   Problem Relation Age of Onset    Breast cancer Mother     Hyperlipidemia Father     Hypertension Father     No Known Problems Sister     Obesity Brother     Klinefelter's syndrome Brother     Breast cancer Maternal Grandmother        Social History     Socioeconomic History    Marital status: Single   Tobacco Use    Smoking status: Every Day     Packs/day: 0.00     Years: 2.00     Additional pack years: 0.00     Total pack years: 0.00     Types: Electronic Cigarette, Cigarettes    Smokeless tobacco: Never   Vaping Use    Vaping Use: Every day    Substances: Nicotine    Devices: Disposable    Passive vaping exposure: Yes   Substance and Sexual Activity    Alcohol use: Yes     Comment: Ocassionally    Drug use: No       No Known Allergies    ROS    Review of Systems   Constitutional:  Positive for chills and fatigue.   HENT:  Positive for congestion.    Respiratory:  Positive for cough  and shortness of breath. Negative for wheezing.    Cardiovascular:  Positive for chest pain.   Gastrointestinal:  Positive for nausea. Negative for diarrhea and vomiting.   Musculoskeletal:  Positive for myalgias.   Neurological:  Positive for headache.       Vitals:    02/08/24 0934   BP: 120/72   Pulse: 92   SpO2: 97%     Body mass index is 24.8 kg/m².      Current Outpatient Medications:     nicotine (Nicoderm CQ) 14 MG/24HR patch, Place 1 patch on the skin as directed by provider Daily. Start after 21 finishes, Disp: 28 each, Rfl: 0    nicotine (Nicoderm CQ) 21 MG/24HR patch, Place 1 patch on the skin as directed by provider Daily., Disp: 28 each, Rfl: 1    nicotine (Nicoderm CQ) 7 MG/24HR patch, Place 1 patch on the skin as directed by provider Daily., Disp: 30 each, Rfl: 2    vitamin B-12 (CYANOCOBALAMIN) 1000 MCG tablet, Take 1 tablet by mouth Daily., Disp: 90 tablet, Rfl: 3    vitamin D (ERGOCALCIFEROL) 1.25 MG (63984 UT) capsule capsule, TAKE 1 CAPSULE BY MOUTH 1 TIME EVERY WEEK FOR 12 DOSES, Disp: 12 capsule, Rfl: 0    albuterol sulfate  (90 Base) MCG/ACT inhaler, Inhale 2 puffs Every 4 (Four) Hours As Needed for Wheezing., Disp: 8 g, Rfl: 0    amoxicillin-clavulanate (AUGMENTIN) 875-125 MG per tablet, Take 1 tablet by mouth 2 (Two) Times a Day., Disp: 14 tablet, Rfl: 0    amphetamine-dextroamphetamine (Adderall) 7.5 MG tablet, 1 tab PO every afternoon, Disp: 30 tablet, Rfl: 0    lisdexamfetamine (Vyvanse) 30 MG capsule, Take 1 capsule by mouth Every Morning, Disp: 30 capsule, Rfl: 0    PE    Physical Exam  Vitals reviewed.   Constitutional:       General: He is not in acute distress.     Appearance: He is well-developed.   HENT:      Head: Normocephalic.      Right Ear: Tympanic membrane and ear canal normal. Tympanic membrane is not erythematous.      Left Ear: Tympanic membrane is perforated and erythematous.      Ears:      Comments: Left TM dull, erythematous, chronic TM perforation     Nose:       Right Sinus: No maxillary sinus tenderness or frontal sinus tenderness.      Left Sinus: No maxillary sinus tenderness or frontal sinus tenderness.      Mouth/Throat:      Mouth: Mucous membranes are moist.      Pharynx: Uvula midline. Posterior oropharyngeal erythema (mild) present.      Tonsils: No tonsillar exudate.   Eyes:      General:         Right eye: No discharge.         Left eye: No discharge.      Conjunctiva/sclera: Conjunctivae normal.   Neck:     Cardiovascular:      Rate and Rhythm: Normal rate and regular rhythm.      Heart sounds: Normal heart sounds.   Pulmonary:      Effort: Pulmonary effort is normal. No respiratory distress.      Breath sounds: Normal breath sounds. No wheezing, rhonchi or rales.   Musculoskeletal:      Cervical back: Normal range of motion and neck supple.   Lymphadenopathy:      Cervical: Cervical adenopathy present.      Upper Body:      Right upper body: No supraclavicular adenopathy.      Left upper body: No supraclavicular adenopathy.   Skin:     General: Skin is warm and dry.   Psychiatric:         Behavior: Behavior normal.         Results    Results for orders placed or performed in visit on 07/28/23   Vitamin D,25-Hydroxy    Specimen: Blood   Result Value Ref Range    25 Hydroxy, Vitamin D 12.4 (L) 30.0 - 100.0 ng/ml       A/P    Problem List Items Addressed This Visit          Mental Health    Attention deficit hyperactivity disorder (ADHD), predominantly hyperactive type    Overview     Formulary change from Adderall XR 10mg to Vyvanse 30mg daily in August 2021         Current Assessment & Plan     Doing well on current treatment.   CSA and UDS are on file.   Prescription refills for Vyvanse and Adderall forwarded to Dr. Sharma for approval.   Follow-up with Dr. Miller as scheduled for a physical next month.           Other Visit Diagnoses       Left otitis media, unspecified otitis media type    -  Primary    Hx of otitis media in past. Treat with 1-week course  Augmentin.    Lymphadenitis        Upper respiratory tract infection, unspecified type        Flu/strep/covid swabs negative. Check for mono per pt request.  Discussed symptomatic management. Delsym OTC for cough.   Rx albuterol inhaler to use prn soa.    Relevant Medications    amoxicillin-clavulanate (AUGMENTIN) 875-125 MG per tablet    Other Relevant Orders    POC Rapid Strep A    POCT SARS-CoV-2 + Flu Antigen LUCIEN    EBV Antibody Profile            Plan of care was reviewed with patient at the conclusion of today's visit. Education was provided regarding diagnoses, management, and the importance of keeping follow-up appointments. The patient was counseled regarding the risks, benefits, and possible side-effects of treatment. Patient and/or family express understanding and agreement with the management plan.        Jono Travis PA-C

## 2024-02-08 NOTE — ASSESSMENT & PLAN NOTE
Doing well on current treatment.   CSA and UDS are on file.   Prescription refills for Vyvanse and Adderall forwarded to Dr. Sharma for approval.   Follow-up with Dr. Miller as scheduled for a physical next month.

## 2024-02-08 NOTE — PATIENT INSTRUCTIONS
"Attain adequate rest and increase clear fluid intake.   Start vitamin C 500-1000 mg daily.   Use Zinc lozenges or chewable tablets (generic for Zicam) in the early course of your illness.   Practice regular hand hygiene and cover your cough to help prevent spread of infection  Use warm salt water gargles, Cepacol lozenges, and hot tea with honey as needed for throat comfort.   Use Mucinex 600 mg twice daily and \"ocean\" or \"simply saline\" brand sterile nasal spray twice daily.   Use warm compresses over sinuses for comfort as needed.  Alternate taking Tylenol 500 mg and Ibuprofen 400 mg every 4 hours as needed for headache, body aches, throat pain, and/or fever reduction  Please return if symptoms worsen or persist for more than 7-10 days.    "

## 2024-02-09 LAB
EBV NA IGG SER IA-ACNC: <18 U/ML (ref 0–17.9)
EBV VCA IGG SER IA-ACNC: <18 U/ML (ref 0–17.9)
EBV VCA IGM SER IA-ACNC: <36 U/ML (ref 0–35.9)
SERVICE CMNT-IMP: NORMAL

## 2024-03-07 RX ORDER — ALBUTEROL SULFATE 90 UG/1
2 AEROSOL, METERED RESPIRATORY (INHALATION) EVERY 4 HOURS PRN
Qty: 18 G | Refills: 0 | Status: SHIPPED | OUTPATIENT
Start: 2024-03-07

## 2024-03-10 DIAGNOSIS — F90.1 ATTENTION DEFICIT HYPERACTIVITY DISORDER (ADHD), PREDOMINANTLY HYPERACTIVE TYPE: ICD-10-CM

## 2024-03-11 RX ORDER — LISDEXAMFETAMINE DIMESYLATE CAPSULES 30 MG/1
30 CAPSULE ORAL EVERY MORNING
Qty: 30 CAPSULE | Refills: 0 | Status: SHIPPED | OUTPATIENT
Start: 2024-03-11

## 2024-03-11 RX ORDER — DEXTROAMPHETAMINE SACCHARATE, AMPHETAMINE ASPARTATE, DEXTROAMPHETAMINE SULFATE AND AMPHETAMINE SULFATE 1.875; 1.875; 1.875; 1.875 MG/1; MG/1; MG/1; MG/1
TABLET ORAL
Qty: 30 TABLET | Refills: 0 | Status: SHIPPED | OUTPATIENT
Start: 2024-03-11

## 2024-03-11 NOTE — TELEPHONE ENCOUNTER
Rx Refill Note  Requested Prescriptions     Pending Prescriptions Disp Refills    lisdexamfetamine (Vyvanse) 30 MG capsule 30 capsule 0     Sig: Take 1 capsule by mouth Every Morning    amphetamine-dextroamphetamine (Adderall) 7.5 MG tablet 30 tablet 0     Si tab PO every afternoon      Last office visit with prescribing clinician: 2024  Last telemedicine visit with prescribing clinician: Visit date not found   Next office visit with prescribing clinician: 2024                        Would you like a call back once the refill request has been completed: [] Yes [] No    If the office needs to give you a call back, can they leave a voicemail: [] Yes [] No    Chikis Patel MA  24, 08:25 EDT

## 2024-03-13 ENCOUNTER — PRIOR AUTHORIZATION (OUTPATIENT)
Dept: FAMILY MEDICINE CLINIC | Facility: CLINIC | Age: 34
End: 2024-03-13
Payer: MEDICAID

## 2024-03-13 NOTE — TELEPHONE ENCOUNTER
Resubmitted Pa with brand name     Member should be able to get the drug/product without a PA at this time.

## 2024-03-13 NOTE — TELEPHONE ENCOUNTER
(Key: [id number])  Lisdexamfetamine Dimesylate 30MG capsules  Status: Question Response - N/A  Created: March 11th, 2024 [phone]      Additional Information Required  The brand name, Vyvanse, is preferred for the member, but requires prior authorization.If the brand is acceptable, please resubmit the ePA under the brand name and continue through the criteria.If there is a clinical reason the patient cannot use the brand formulation (such as an allergy to an ingredient found in the brand that is not in the generic) then please fax the appropriate PA request along with the confirming documentation directly to [employer] at [phone].

## 2024-03-13 NOTE — TELEPHONE ENCOUNTER
jimmy billingsley (Key: [id number])  Vyvanse 30MG capsules  Status: Question Response - N/ACreated: March 13th, 2024      Resubmitted PA with brand name   Member should be able to get the drug/product without a PA at this time.

## 2024-03-25 RX ORDER — ALBUTEROL SULFATE 90 UG/1
2 AEROSOL, METERED RESPIRATORY (INHALATION) EVERY 4 HOURS PRN
Qty: 18 G | Refills: 1 | Status: SHIPPED | OUTPATIENT
Start: 2024-03-25

## 2024-04-08 DIAGNOSIS — F90.1 ATTENTION DEFICIT HYPERACTIVITY DISORDER (ADHD), PREDOMINANTLY HYPERACTIVE TYPE: ICD-10-CM

## 2024-04-08 RX ORDER — DEXTROAMPHETAMINE SACCHARATE, AMPHETAMINE ASPARTATE, DEXTROAMPHETAMINE SULFATE AND AMPHETAMINE SULFATE 1.875; 1.875; 1.875; 1.875 MG/1; MG/1; MG/1; MG/1
TABLET ORAL
Qty: 30 TABLET | Refills: 0 | Status: SHIPPED | OUTPATIENT
Start: 2024-04-08

## 2024-04-08 RX ORDER — NICOTINE 21 MG/24HR
1 PATCH, TRANSDERMAL 24 HOURS TRANSDERMAL EVERY 24 HOURS
Qty: 28 EACH | Refills: 1 | Status: SHIPPED | OUTPATIENT
Start: 2024-04-08

## 2024-04-08 RX ORDER — LISDEXAMFETAMINE DIMESYLATE CAPSULES 30 MG/1
30 CAPSULE ORAL EVERY MORNING
Qty: 30 CAPSULE | Refills: 0 | Status: SHIPPED | OUTPATIENT
Start: 2024-04-08

## 2024-04-08 NOTE — TELEPHONE ENCOUNTER
Rx Refill Note  Requested Prescriptions     Pending Prescriptions Disp Refills    lisdexamfetamine (Vyvanse) 30 MG capsule 30 capsule 0     Sig: Take 1 capsule by mouth Every Morning    amphetamine-dextroamphetamine (Adderall) 7.5 MG tablet 30 tablet 0     Si tab PO every afternoon      Last office visit with prescribing clinician: 2024     Last telemedicine visit with prescribing clinician: Visit date not found   Next office visit with prescribing clinician: 2024                          Would you like a call back once the refill request has been completed: [] Yes [] No    If the office needs to give you a call back, can they leave a voicemail: [] Yes [] No    Ashlee Montgomery MA  24, 12:58 EDT    CSA 23  UDS 23

## 2024-04-08 NOTE — TELEPHONE ENCOUNTER
Rx Refill Note  Requested Prescriptions     Pending Prescriptions Disp Refills    nicotine (Nicoderm CQ) 21 MG/24HR patch 28 each 1     Sig: Place 1 patch on the skin as directed by provider Daily.      Last office visit with prescribing clinician: 2/8/2024    Last telemedicine visit with prescribing clinician: Visit date not found   Next office visit with prescribing clinician: 5/7/2024                          Would you like a call back once the refill request has been completed: [] Yes [] No    If the office needs to give you a call back, can they leave a voicemail: [] Yes [] No    Ashlee Montgomery MA  04/08/24, 12:59 EDT

## 2024-04-29 ENCOUNTER — TELEPHONE (OUTPATIENT)
Dept: FAMILY MEDICINE CLINIC | Facility: CLINIC | Age: 34
End: 2024-04-29

## 2024-04-29 NOTE — TELEPHONE ENCOUNTER
Caller: Mayo Landers    Relationship: Self    Best call back number: [phone]     What form or medical record are you requesting: MAGGIE LETTER FOR DOG     Who is requesting this form or medical record from you: APARTMENT    How would you like to receive the form or medical records (pick-up, mail, fax): [facility]    Timeframe paperwork needed: AS SOON AS POSSIBLE     Additional notes: PATIENT IS REQUESTING A COPY OF HIS MAGGIE LETTER FOR HIS DOG. HIS APARTMENT IS REQUESTING A LETTER FOR HIS DOG. PATIENT STATES DR CONTEH WROTE ONE A FEW YEARS AGO/ PATIENT NEEDS THE MAGGIE LETTER AS SOON POSSIBLE.

## 2024-05-07 ENCOUNTER — TELEPHONE (OUTPATIENT)
Dept: FAMILY MEDICINE CLINIC | Facility: CLINIC | Age: 34
End: 2024-05-07

## 2024-05-07 ENCOUNTER — OFFICE VISIT (OUTPATIENT)
Dept: FAMILY MEDICINE CLINIC | Facility: CLINIC | Age: 34
End: 2024-05-07
Payer: MEDICAID

## 2024-05-07 VITALS
HEART RATE: 101 BPM | OXYGEN SATURATION: 96 % | DIASTOLIC BLOOD PRESSURE: 74 MMHG | WEIGHT: 170 LBS | HEIGHT: 69 IN | SYSTOLIC BLOOD PRESSURE: 122 MMHG | BODY MASS INDEX: 25.18 KG/M2

## 2024-05-07 DIAGNOSIS — E56.9 VITAMIN DEFICIENCY: ICD-10-CM

## 2024-05-07 DIAGNOSIS — F90.1 ATTENTION DEFICIT HYPERACTIVITY DISORDER (ADHD), PREDOMINANTLY HYPERACTIVE TYPE: ICD-10-CM

## 2024-05-07 DIAGNOSIS — E55.9 VITAMIN D DEFICIENCY: ICD-10-CM

## 2024-05-07 DIAGNOSIS — F17.200 SMOKER: ICD-10-CM

## 2024-05-07 DIAGNOSIS — Z13.29 SCREENING FOR ENDOCRINE DISORDER: ICD-10-CM

## 2024-05-07 DIAGNOSIS — Z13.0 SCREENING FOR DEFICIENCY ANEMIA: ICD-10-CM

## 2024-05-07 DIAGNOSIS — Z00.00 WELL ADULT EXAM: Primary | ICD-10-CM

## 2024-05-07 RX ORDER — DEXTROAMPHETAMINE SACCHARATE, AMPHETAMINE ASPARTATE, DEXTROAMPHETAMINE SULFATE AND AMPHETAMINE SULFATE 1.875; 1.875; 1.875; 1.875 MG/1; MG/1; MG/1; MG/1
TABLET ORAL
Qty: 30 TABLET | Refills: 0 | Status: SHIPPED | OUTPATIENT
Start: 2024-05-07

## 2024-05-07 RX ORDER — VARENICLINE TARTRATE 1 MG/1
1 TABLET, FILM COATED ORAL 2 TIMES DAILY
Qty: 180 TABLET | Refills: 0 | Status: SHIPPED | OUTPATIENT
Start: 2024-05-07 | End: 2024-08-05

## 2024-05-07 RX ORDER — LISDEXAMFETAMINE DIMESYLATE 30 MG/1
30 CAPSULE ORAL EVERY MORNING
Qty: 30 CAPSULE | Refills: 0 | Status: SHIPPED | OUTPATIENT
Start: 2024-05-07

## 2024-05-07 RX ORDER — VARENICLINE TARTRATE 0.5 MG/1
TABLET, FILM COATED ORAL
Qty: 11 TABLET | Refills: 0 | Status: SHIPPED | OUTPATIENT
Start: 2024-05-07

## 2024-05-07 NOTE — PROGRESS NOTES
Annual Well Adult Visit     Patient Name: Mayo Landers  : 1990   MRN: [id number]   Care Team: Patient Care Team:  Francisco Javier Miller DO as PCP - General (Family Medicine)  Provider, No Known    Chief Complaint:    Chief Complaint   Patient presents with    Annual Exam     Patient would like MAGGIE letter for apartment, patient wants different options to stop smoking       HPI    History of Present Illness: Mayo Landers is a 33 y.o. male who presents today for annual physical exam and preventative care. Back at school at [employer], serving at [employer]. Medication is doing well, feels stable.    Recent Hospitalizations:  He was not admitted to the hospital during the last year.     The following portions of the patient's history were reviewed and updated as appropriate: allergies, current medications, past family history, past medical history, past social history, past surgical history and problem list.       Allied Screenings    N/A         Date      Eye Exam   Uses Glasses/Contacts and Eye Exam Up To Date    []              [x]   Up to date    Location:   []   Recommended       Counseled every 2 years in those without known issues, yearly if wearing glasses or contacts      Dental Exam   Dentition: good    []           []   Up to date   Location:   [x]   Recommended       Counseled, recommended cleanings every 6 months, daily brushing and flossing        Skin Cancer Screening  Turcios Skin Type: IV      [x]            []   Up to date   Location:   []   Recommended Counseled on regular sunscreen wear, self-skin checks      Obesity Counseling  Body mass index is 25.09 kg/m².       [x]        []   Complete   []   Nutritionist referral   []   Declined Counseled on moderate portions, low meat diet focusing on whole foods and plant-based protein          Additional Testing         Date      Colorectal Screening   Risk: average     []   N/A   []   Ordered today   []   Complete        Date:     Where:        "  PSA  (Over age 50)     []   N/A   []   Ordered today   []   Complete      No results found for: \"PSA\"        US Aorta  (For male with >20 cigarette history, age 65)     []   N/A   []   Ordered today   []   Complete    Date:     Where:      CT for Smoker  (Age 55-75, 30 pk yr)     []   N/A   []   Ordered today   []   Complete    Date:     Where:      Hep. C     []   Ordered today   []   Complete      No results found for: \"HEPCVIRUSABY\"         HTN screening  BP Readings from Last 1 Encounters:   05/07/24 122/74           []   On BP medication   []   Lifestyle measures   []   Normotensive   Lifestyle Factors:  Caffeine Intake: 1 cans/bottles of caffeinated soda pop per day  Alcohol Intake: does not drink  Smoking status:   Social History     Tobacco Use   Smoking Status Every Day    Current packs/day: 0.00    Types: Electronic Cigarette, Cigarettes   Smokeless Tobacco Never      Exogenous hormone use: None  NSAID use: None  Exercise: irregularly       Subjective      Review of Systems:   Review of Systems - See HPI    Past Medical History:   Past Medical History:   Diagnosis Date    Chronic bronchitis     DUe to vaping previously, no medications, no chronic issues    Fatty liver     Fracture        Past Surgical History:   Past Surgical History:   Procedure Laterality Date    EAR TUBES         Family History:   Family History   Problem Relation Age of Onset    Breast cancer Mother     Hyperlipidemia Father     Hypertension Father     No Known Problems Sister     Obesity Brother     Klinefelter's syndrome Brother     Breast cancer Maternal Grandmother        Social History:   Social History     Socioeconomic History    Marital status: Single   Tobacco Use    Smoking status: Every Day     Current packs/day: 0.00     Types: Electronic Cigarette, Cigarettes    Smokeless tobacco: Never   Vaping Use    Vaping status: Every Day    Substances: Nicotine    Devices: Disposable    Passive vaping exposure: Yes   Substance and " Sexual Activity    Alcohol use: Yes     Comment: Ocassionally    Drug use: No       Social History     Social History Narrative    Not on file        Tobacco History:   Social History     Tobacco Use   Smoking Status Every Day    Current packs/day: 0.00    Types: Electronic Cigarette, Cigarettes   Smokeless Tobacco Never       Medications:     Current Outpatient Medications:     albuterol sulfate HFA (Ventolin HFA) 108 (90 Base) MCG/ACT inhaler, INHALE 2 PUFFS EVERY 4 HOURS AS NEEDED FOR WHEEZING, Disp: 18 g, Rfl: 1    amphetamine-dextroamphetamine (Adderall) 7.5 MG tablet, 1 tab PO every afternoon, Disp: 30 tablet, Rfl: 0    lisdexamfetamine (Vyvanse) 30 MG capsule, Take 1 capsule by mouth Every Morning, Disp: 30 capsule, Rfl: 0    nicotine (Nicoderm CQ) 14 MG/24HR patch, Place 1 patch on the skin as directed by provider Daily. Start after 21 finishes, Disp: 28 each, Rfl: 0    nicotine (Nicoderm CQ) 21 MG/24HR patch, Place 1 patch on the skin as directed by provider Daily., Disp: 28 each, Rfl: 1    nicotine (Nicoderm CQ) 7 MG/24HR patch, Place 1 patch on the skin as directed by provider Daily., Disp: 30 each, Rfl: 2    vitamin B-12 (CYANOCOBALAMIN) 1000 MCG tablet, Take 1 tablet by mouth Daily., Disp: 90 tablet, Rfl: 3    vitamin D (ERGOCALCIFEROL) 1.25 MG (43500 UT) capsule capsule, TAKE 1 CAPSULE BY MOUTH 1 TIME EVERY WEEK FOR 12 DOSES, Disp: 12 capsule, Rfl: 0    varenicline (Chantix) 0.5 MG tablet, Take 1 tablet PO daily for 3 days, then 1 tablet PO morning and night for 4 days, Disp: 11 tablet, Rfl: 0    varenicline (Chantix) 1 MG tablet, Take 1 tablet by mouth 2 (Two) Times a Day for 90 days., Disp: 180 tablet, Rfl: 0    Immunizations:   Immunization History   Administered Date(s) Administered    COVID-19 (PFIZER) Purple Cap Monovalent 04/29/2021, 05/20/2021, 02/14/2022    Flu Vaccine Split Quad 10/14/2019    Fluzone (or Fluarix & Flulaval for VFC) >6mos 10/14/2019, 02/14/2022, 10/25/2022, 10/04/2023    Tdap  "07/13/2015        Allergies:   No Known Allergies    Objective   Objective     Physical Exam:  Vital Signs:   Vitals:    05/07/24 1352   BP: 122/74   BP Location: Left arm   Patient Position: Sitting   Cuff Size: Adult   Pulse: 101   SpO2: 96%   Weight: 77.1 kg (170 lb)   Height: 175.3 cm (69.02\")     Body mass index is 25.09 kg/m².   Facility age limit for growth %rodrigo is 20 years.   Physical Exam  Nursing note reviewed  Const: NAD, Pleasant, Cooperative  Eyes: EOMI, no conjunctivitis  ENT: No nasal discharge present, neck supple  Cardiac: Regular rate and rhythm, no cyanosis  PHQ-2 Depression Screening  Little interest or pleasure in doing things?     Feeling down, depressed, or hopeless?     PHQ-2 Total Score       Procedures/Radiology     Procedures  No radiology results for the last 7 days         Assessment & Plan   Assessment / Plan      Assessment/Plan:   Problems Addressed This Visit  Diagnoses and all orders for this visit:    1. Well adult exam (Primary)    2. Attention deficit hyperactivity disorder (ADHD), predominantly hyperactive type  Assessment & Plan:  Vyvanse 30mg plus Adderall 7.5mg in afternoon has been very beneficial    Orders:  -     amphetamine-dextroamphetamine (Adderall) 7.5 MG tablet; 1 tab PO every afternoon  Dispense: 30 tablet; Refill: 0  -     lisdexamfetamine (Vyvanse) 30 MG capsule; Take 1 capsule by mouth Every Morning  Dispense: 30 capsule; Refill: 0    3. Smoker  Assessment & Plan:  At about 1/2 ppd plus vaping    Orders:  -     varenicline (Chantix) 1 MG tablet; Take 1 tablet by mouth 2 (Two) Times a Day for 90 days.  Dispense: 180 tablet; Refill: 0  -     varenicline (Chantix) 0.5 MG tablet; Take 1 tablet PO daily for 3 days, then 1 tablet PO morning and night for 4 days  Dispense: 11 tablet; Refill: 0    4. Screening for endocrine disorder  -     Comprehensive Metabolic Panel; Future  -     TSH Rfx On Abnormal To Free T4; Future    5. Screening for deficiency anemia  -     CBC " & Differential; Future    6. Vitamin deficiency  -     Vitamin B12; Future    7. Vitamin D deficiency  -     Vitamin D,25-Hydroxy; Future      Problem List Items Addressed This Visit          Mental Health    Attention deficit hyperactivity disorder (ADHD), predominantly hyperactive type    Overview     Formulary change from Adderall XR 10mg to Vyvanse 30mg daily in August 2021         Current Assessment & Plan     Vyvanse 30mg plus Adderall 7.5mg in afternoon has been very beneficial         Relevant Medications    varenicline (Chantix) 1 MG tablet    varenicline (Chantix) 0.5 MG tablet    amphetamine-dextroamphetamine (Adderall) 7.5 MG tablet    lisdexamfetamine (Vyvanse) 30 MG capsule       Tobacco    Smoker    Current Assessment & Plan     At about 1/2 ppd plus vaping         Relevant Medications    varenicline (Chantix) 1 MG tablet    varenicline (Chantix) 0.5 MG tablet     Other Visit Diagnoses       Well adult exam    -  Primary    Screening for endocrine disorder        Relevant Orders    Comprehensive Metabolic Panel    TSH Rfx On Abnormal To Free T4    Screening for deficiency anemia        Relevant Orders    CBC & Differential    Vitamin deficiency        Relevant Orders    Vitamin B12    Vitamin D deficiency        Relevant Orders    Vitamin D,25-Hydroxy            See patient diagnoses and orders along with patient instructions for assessment, plan, and changes to care for patient.    The preventative exam has been reviewed in detail.  The patient has been fully counseled on preventative guidelines for vaccines, cancer screenings, and other health maintenance needs. The patient was counseled on maintaining a lifestyle to promote good health and to minimize chronic diseases.  The patient has been assisted with scheduling healthcare procedures for the coming year and given a written document outlining these recommendations. Age-appropriate screening measures have been ordered for the patient today as  indicated above.    There are no Patient Instructions on file for this visit.    Follow Up:   Return in about 3 months (around 8/7/2024) for Controlled substance 3-month.    DO [address]  [facility]  [address]  [place], located within [facility] [address]  Fax [phone]  Phone [phone]

## 2024-05-07 NOTE — LETTER
May 7, 2024    Mayo Landers  [address]  [address]  [place] [address]          Mayo Landers is my patient, and has been under my care since 4/20/2019 I am intimately familiar with his history and with the functional limitations imposed by his emotional related illness.    He has expressed a desire to keep his dog as an emotional support animal. It is my impression that allowing him to do so would help provide much-needed emotional and psychological support. The loss of this animal could potentially be emotionally and psychologically detrimental to this patient. Please consider allowing him to keep the specified pet as a  animal, as requested. Feel free to contact my office with any further concerns.                        Francisco Javier Miller, DO

## 2024-05-30 ENCOUNTER — PRIOR AUTHORIZATION (OUTPATIENT)
Dept: FAMILY MEDICINE CLINIC | Facility: CLINIC | Age: 34
End: 2024-05-30
Payer: MEDICAID

## 2024-05-30 NOTE — TELEPHONE ENCOUNTER
Mayo Landers (Key: [id number])  Lisdexamfetamine Dimesylate 30MG capsules  Status: Question Response - N/ACreated: May 7th, 2024 [phone]        The brand name, Vyvanse, is preferred for the member, but requires prior authorization.If the brand is acceptable, please resubmit the ePA under the brand name and continue through the criteria.If there is a clinical reason the patient cannot use the brand formulation (such as an allergy to an ingredient found in the brand that is not in the generic) then please fax the appropriate PA request along with the confirming documentation directly to [employer] at [phone].

## 2024-05-30 NOTE — TELEPHONE ENCOUNTER
That's fine, just resubmit the PA under the brand name. We should not have to change the prescription.

## 2024-05-30 NOTE — TELEPHONE ENCOUNTER
jimmy billingsley (Key: [id number])  Vyvanse 30MG capsules  Status: Sent To PlanCreated: May 30th, 2024Sent: May 30th, 2024

## 2024-06-06 DIAGNOSIS — F90.1 ATTENTION DEFICIT HYPERACTIVITY DISORDER (ADHD), PREDOMINANTLY HYPERACTIVE TYPE: ICD-10-CM

## 2024-06-06 NOTE — TELEPHONE ENCOUNTER
Caller: Mayo Landers    Relationship: Self    Best call back number: [phone]     Requested Prescriptions:   Requested Prescriptions     Pending Prescriptions Disp Refills    amphetamine-dextroamphetamine (Adderall) 7.5 MG tablet 30 tablet 0     Si tab PO every afternoon    lisdexamfetamine (Vyvanse) 30 MG capsule 30 capsule 0     Sig: Take 1 capsule by mouth Every Morning        Pharmacy where request should be sent: [facility][id number] [address] [phone] [facility] [phone] FX     Last office visit with prescribing clinician: 2024   Last telemedicine visit with prescribing clinician: Visit date not found   Next office visit with prescribing clinician: Visit date not found     Additional details provided by patient: PATIENT STATED HIS INSURANCE WILL NOT COVER GENERIC. PLEASE ORDER FOR BRAND NAME ONLY.    Does the patient have less than a 3 day supply:  [x] Yes  [] No    Would you like a call back once the refill request has been completed: [] Yes [x] No    If the office needs to give you a call back, can they leave a voicemail: [] Yes [x] No    Kali Adamson   24 11:23 EDT

## 2024-06-07 RX ORDER — DEXTROAMPHETAMINE SACCHARATE, AMPHETAMINE ASPARTATE, DEXTROAMPHETAMINE SULFATE AND AMPHETAMINE SULFATE 1.875; 1.875; 1.875; 1.875 MG/1; MG/1; MG/1; MG/1
TABLET ORAL
Qty: 30 TABLET | Refills: 0 | Status: SHIPPED | OUTPATIENT
Start: 2024-06-07

## 2024-06-07 RX ORDER — LISDEXAMFETAMINE DIMESYLATE 30 MG/1
30 CAPSULE ORAL EVERY MORNING
Qty: 30 CAPSULE | Refills: 0 | Status: SHIPPED | OUTPATIENT
Start: 2024-06-07

## 2024-07-05 DIAGNOSIS — F17.200 SMOKER: ICD-10-CM

## 2024-07-05 DIAGNOSIS — F90.1 ATTENTION DEFICIT HYPERACTIVITY DISORDER (ADHD), PREDOMINANTLY HYPERACTIVE TYPE: ICD-10-CM

## 2024-07-08 RX ORDER — LISDEXAMFETAMINE DIMESYLATE 30 MG/1
30 CAPSULE ORAL EVERY MORNING
Qty: 30 CAPSULE | Refills: 0 | Status: SHIPPED | OUTPATIENT
Start: 2024-07-08

## 2024-07-08 RX ORDER — VARENICLINE TARTRATE 1 MG/1
1 TABLET, FILM COATED ORAL 2 TIMES DAILY
Qty: 180 TABLET | Refills: 0 | Status: SHIPPED | OUTPATIENT
Start: 2024-07-08 | End: 2024-10-06

## 2024-07-08 RX ORDER — DEXTROAMPHETAMINE SACCHARATE, AMPHETAMINE ASPARTATE, DEXTROAMPHETAMINE SULFATE AND AMPHETAMINE SULFATE 1.875; 1.875; 1.875; 1.875 MG/1; MG/1; MG/1; MG/1
TABLET ORAL
Qty: 30 TABLET | Refills: 0 | Status: SHIPPED | OUTPATIENT
Start: 2024-07-08

## 2024-08-08 DIAGNOSIS — F17.200 SMOKER: ICD-10-CM

## 2024-08-08 DIAGNOSIS — F90.1 ATTENTION DEFICIT HYPERACTIVITY DISORDER (ADHD), PREDOMINANTLY HYPERACTIVE TYPE: ICD-10-CM

## 2024-08-08 RX ORDER — DEXTROAMPHETAMINE SACCHARATE, AMPHETAMINE ASPARTATE, DEXTROAMPHETAMINE SULFATE AND AMPHETAMINE SULFATE 1.875; 1.875; 1.875; 1.875 MG/1; MG/1; MG/1; MG/1
TABLET ORAL
Qty: 30 TABLET | Refills: 0 | Status: SHIPPED | OUTPATIENT
Start: 2024-08-08

## 2024-08-08 RX ORDER — VARENICLINE TARTRATE 1 MG/1
1 TABLET, FILM COATED ORAL 2 TIMES DAILY
Qty: 180 TABLET | Refills: 0 | Status: SHIPPED | OUTPATIENT
Start: 2024-08-08 | End: 2024-11-06

## 2024-08-08 RX ORDER — LISDEXAMFETAMINE DIMESYLATE 30 MG/1
30 CAPSULE ORAL EVERY MORNING
Qty: 30 CAPSULE | Refills: 0 | Status: SHIPPED | OUTPATIENT
Start: 2024-08-08

## 2024-08-08 RX ORDER — VARENICLINE TARTRATE 0.5 MG/1
TABLET, FILM COATED ORAL
Qty: 11 TABLET | Refills: 0 | Status: SHIPPED | OUTPATIENT
Start: 2024-08-08

## 2024-08-08 NOTE — TELEPHONE ENCOUNTER
Caller: Mayo Landers    Relationship: Self    Best call back number: [phone]     Requested Prescriptions:   Requested Prescriptions     Pending Prescriptions Disp Refills    lisdexamfetamine (Vyvanse) 30 MG capsule 30 capsule 0     Sig: Take 1 capsule by mouth Every Morning    amphetamine-dextroamphetamine (Adderall) 7.5 MG tablet 30 tablet 0     Si tab PO every afternoon    varenicline (Chantix) 1 MG tablet 180 tablet 0     Sig: Take 1 tablet by mouth 2 (Two) Times a Day for 90 days.    varenicline (Chantix) 0.5 MG tablet 11 tablet 0     Sig: Take 1 tablet PO daily for 3 days, then 1 tablet PO morning and night for 4 days        Pharmacy where request should be sent: [facility] #[id number] - [address] [phone] [facility] [phone] FX     Last office visit with prescribing clinician: 2024   Last telemedicine visit with prescribing clinician: Visit date not found   Next office visit with prescribing clinician: Visit date not found     Additional details provided by patient: OUT OF MEDICATION     Does the patient have less than a 3 day supply:  [x] Yes  [] No    Would you like a call back once the refill request has been completed: [] Yes [x] No    If the office needs to give you a call back, can they leave a voicemail: [] Yes [x] No    Kali Pickett   24 08:37 EDT

## 2024-09-03 ENCOUNTER — TELEPHONE (OUTPATIENT)
Dept: FAMILY MEDICINE CLINIC | Facility: CLINIC | Age: 34
End: 2024-09-03
Payer: MEDICAID

## 2024-09-03 DIAGNOSIS — F90.1 ATTENTION DEFICIT HYPERACTIVITY DISORDER (ADHD), PREDOMINANTLY HYPERACTIVE TYPE: ICD-10-CM

## 2024-09-03 RX ORDER — DEXTROAMPHETAMINE SACCHARATE, AMPHETAMINE ASPARTATE, DEXTROAMPHETAMINE SULFATE AND AMPHETAMINE SULFATE 1.875; 1.875; 1.875; 1.875 MG/1; MG/1; MG/1; MG/1
TABLET ORAL
Qty: 30 TABLET | Refills: 0 | Status: SHIPPED | OUTPATIENT
Start: 2024-09-03

## 2024-09-03 RX ORDER — LISDEXAMFETAMINE DIMESYLATE 30 MG/1
30 CAPSULE ORAL EVERY MORNING
Qty: 30 CAPSULE | Refills: 0 | Status: SHIPPED | OUTPATIENT
Start: 2024-09-03

## 2024-09-07 NOTE — TELEPHONE ENCOUNTER
Said that the pharmacy is asking that the Vyvanse get put in as DAW1. Unsure what this james, but pharmacy said that if it is put in as this, it will be processed quicker through the insurance.

## 2024-09-10 ENCOUNTER — OFFICE VISIT (OUTPATIENT)
Dept: FAMILY MEDICINE CLINIC | Facility: CLINIC | Age: 34
End: 2024-09-10
Payer: MEDICAID

## 2024-09-10 VITALS
DIASTOLIC BLOOD PRESSURE: 70 MMHG | HEART RATE: 101 BPM | SYSTOLIC BLOOD PRESSURE: 100 MMHG | OXYGEN SATURATION: 98 % | WEIGHT: 177.8 LBS | HEIGHT: 69 IN | BODY MASS INDEX: 26.33 KG/M2

## 2024-09-10 DIAGNOSIS — F90.1 ATTENTION DEFICIT HYPERACTIVITY DISORDER (ADHD), PREDOMINANTLY HYPERACTIVE TYPE: Primary | ICD-10-CM

## 2024-09-10 DIAGNOSIS — Z51.81 THERAPEUTIC DRUG MONITORING: ICD-10-CM

## 2024-09-10 DIAGNOSIS — F90.1 ATTENTION DEFICIT HYPERACTIVITY DISORDER (ADHD), PREDOMINANTLY HYPERACTIVE TYPE: ICD-10-CM

## 2024-09-10 PROCEDURE — 1126F AMNT PAIN NOTED NONE PRSNT: CPT | Performed by: FAMILY MEDICINE

## 2024-09-10 PROCEDURE — 99213 OFFICE O/P EST LOW 20 MIN: CPT | Performed by: FAMILY MEDICINE

## 2024-09-17 LAB — DRUGS UR: NORMAL

## 2024-10-07 DIAGNOSIS — F90.1 ATTENTION DEFICIT HYPERACTIVITY DISORDER (ADHD), PREDOMINANTLY HYPERACTIVE TYPE: ICD-10-CM

## 2024-10-07 NOTE — TELEPHONE ENCOUNTER
Caller: Mayo Landers ZOHREH    Relationship: Self    Best call back number: [phone]     Requested Prescriptions:   Requested Prescriptions     Pending Prescriptions Disp Refills    Vyvanse 30 MG capsule 30 capsule 0     Sig: Take 1 capsule by mouth Every Morning    amphetamine-dextroamphetamine (Adderall) 7.5 MG tablet 30 tablet 0     Si tab PO every afternoon        Pharm[facility] where [facility] be sent: [id number] - [address] [phone] [facility] [phone]      Last office visit with prescribing clinician: 9/10/2024   Last telemedicine visit with prescribing clinician: Visit date not found   Next office visit with prescribing clinician: 2024     Additional details provided by patient: PATIENT WILL BE OUT TOMORROW.     Does the patient have less than a 3 day supply:  [x] Yes  [] No    Would you like a call back once the refill request has been completed: [x] Yes [] No    If the office needs to give you a call back, can they leave a voicemail: [x] Yes [] No    Kali Stafford Rep   10/07/24 08:55 EDT

## 2024-10-08 RX ORDER — LISDEXAMFETAMINE DIMESYLATE 30 MG/1
30 CAPSULE ORAL EVERY MORNING
Qty: 30 CAPSULE | Refills: 0 | Status: SHIPPED | OUTPATIENT
Start: 2024-10-08

## 2024-10-08 RX ORDER — DEXTROAMPHETAMINE SACCHARATE, AMPHETAMINE ASPARTATE, DEXTROAMPHETAMINE SULFATE AND AMPHETAMINE SULFATE 1.875; 1.875; 1.875; 1.875 MG/1; MG/1; MG/1; MG/1
TABLET ORAL
Qty: 30 TABLET | Refills: 0 | Status: SHIPPED | OUTPATIENT
Start: 2024-10-08

## 2024-11-04 DIAGNOSIS — F17.200 SMOKER: ICD-10-CM

## 2024-11-04 DIAGNOSIS — F90.1 ATTENTION DEFICIT HYPERACTIVITY DISORDER (ADHD), PREDOMINANTLY HYPERACTIVE TYPE: ICD-10-CM

## 2024-11-04 NOTE — TELEPHONE ENCOUNTER
Caller: Myao Landers (Self)     Best call back number: [phone]    Requested Prescriptions:   Requested Prescriptions     Pending Prescriptions Disp Refills    amphetamine-dextroamphetamine (Adderall) 7.5 MG tablet 30 tablet 0     Si tab PO every afternoon    Vyvanse 30 MG capsule 30 capsule 0     Sig: Take 1 capsule by mouth Every Morning    varenicline (Chantix) 1 MG tablet 180 tablet 0     Sig: Take 1 tablet by mouth 2 (Two) Times a Day for 90 days.    varenicline (Chantix) 0.5 MG tablet 11 tablet 0     Sig: Take 1 tablet PO daily for 3 days, then 1 tablet PO morning and night for 4 days        Pharmacy where request should be sent: [facility] #[id number] - [address] [phone] [facility] [phone] FX     Last office visit with prescribing clinician: 9/10/2024   Last telemedicine visit with prescribing clinician: Visit date not found   Next office visit with prescribing clinician: 2024         Does the patient have less than a 3 day supply:  [x] Yes  [] No    Would you like a call back once the refill request has been completed: [] Yes [x] No    If the office needs to give you a call back, can they leave a voicemail: [] Yes [x] No    Kali Simmons Rep   24 15:24 EST

## 2024-11-05 RX ORDER — VARENICLINE TARTRATE 0.5 MG/1
TABLET, FILM COATED ORAL
Qty: 11 TABLET | Refills: 0 | Status: SHIPPED | OUTPATIENT
Start: 2024-11-05

## 2024-11-05 RX ORDER — LISDEXAMFETAMINE DIMESYLATE 30 MG/1
30 CAPSULE ORAL EVERY MORNING
Qty: 30 CAPSULE | Refills: 0 | Status: SHIPPED | OUTPATIENT
Start: 2024-11-05

## 2024-11-05 RX ORDER — VARENICLINE TARTRATE 1 MG/1
1 TABLET, FILM COATED ORAL 2 TIMES DAILY
Qty: 180 TABLET | Refills: 0 | Status: SHIPPED | OUTPATIENT
Start: 2024-11-05 | End: 2025-02-03

## 2024-11-05 RX ORDER — DEXTROAMPHETAMINE SACCHARATE, AMPHETAMINE ASPARTATE, DEXTROAMPHETAMINE SULFATE AND AMPHETAMINE SULFATE 1.875; 1.875; 1.875; 1.875 MG/1; MG/1; MG/1; MG/1
TABLET ORAL
Qty: 30 TABLET | Refills: 0 | Status: SHIPPED | OUTPATIENT
Start: 2024-11-05

## 2024-12-04 DIAGNOSIS — F90.1 ATTENTION DEFICIT HYPERACTIVITY DISORDER (ADHD), PREDOMINANTLY HYPERACTIVE TYPE: ICD-10-CM

## 2024-12-04 NOTE — TELEPHONE ENCOUNTER
Caller: Mayo Landers    Relationship: Self    Best call back number: [phone]    Requested Prescriptions:   Requested Prescriptions     Pending Prescriptions Disp Refills    amphetamine-dextroamphetamine (Adderall) 7.5 MG tablet 30 tablet 0     Si tab PO every afternoon    Vyvanse 30 MG capsule 30 capsule 0     Sig: Take 1 capsule by mouth Every Morning        [facility] be sent[id number] - [address] [phone] [facility] [phone]      Last office visit with prescribing clinician: 9/10/2024   Last telemedicine visit with prescribing clinician: Visit date not found   Next office visit with prescribing clinician: 2024     Additional details provided by patient: PT IS OUT AND NEEDS REFILLS ASAP    Does the patient have less than a 3 day supply:  [x] Yes  [] No    Would you like a call back once the refill request has been completed: [x] Yes [] No    If the office needs to give you a call back, can they leave a voicemail: [x] Yes [] No    Kali Ramos Rep   24 13:02 EST

## 2024-12-05 RX ORDER — LISDEXAMFETAMINE DIMESYLATE 30 MG/1
30 CAPSULE ORAL EVERY MORNING
Qty: 30 CAPSULE | Refills: 0 | Status: SHIPPED | OUTPATIENT
Start: 2024-12-05

## 2024-12-05 RX ORDER — DEXTROAMPHETAMINE SACCHARATE, AMPHETAMINE ASPARTATE, DEXTROAMPHETAMINE SULFATE AND AMPHETAMINE SULFATE 1.875; 1.875; 1.875; 1.875 MG/1; MG/1; MG/1; MG/1
TABLET ORAL
Qty: 30 TABLET | Refills: 0 | Status: SHIPPED | OUTPATIENT
Start: 2024-12-05

## 2024-12-12 ENCOUNTER — OFFICE VISIT (OUTPATIENT)
Dept: FAMILY MEDICINE CLINIC | Facility: CLINIC | Age: 34
End: 2024-12-12
Payer: MEDICAID

## 2024-12-12 VITALS
SYSTOLIC BLOOD PRESSURE: 122 MMHG | HEIGHT: 69 IN | WEIGHT: 173.3 LBS | HEART RATE: 64 BPM | DIASTOLIC BLOOD PRESSURE: 75 MMHG | BODY MASS INDEX: 25.67 KG/M2 | OXYGEN SATURATION: 97 %

## 2024-12-12 DIAGNOSIS — J02.9 SORE THROAT: ICD-10-CM

## 2024-12-12 DIAGNOSIS — F90.1 ATTENTION DEFICIT HYPERACTIVITY DISORDER (ADHD), PREDOMINANTLY HYPERACTIVE TYPE: ICD-10-CM

## 2024-12-12 DIAGNOSIS — J02.9 SORE THROAT: Primary | ICD-10-CM

## 2024-12-12 LAB
EXPIRATION DATE: NORMAL
INTERNAL CONTROL: NORMAL
Lab: NORMAL
S PYO AG THROAT QL: NEGATIVE

## 2024-12-12 PROCEDURE — 1126F AMNT PAIN NOTED NONE PRSNT: CPT | Performed by: FAMILY MEDICINE

## 2024-12-12 PROCEDURE — 1159F MED LIST DOCD IN RCRD: CPT | Performed by: FAMILY MEDICINE

## 2024-12-12 PROCEDURE — 99214 OFFICE O/P EST MOD 30 MIN: CPT | Performed by: FAMILY MEDICINE

## 2024-12-12 PROCEDURE — 1160F RVW MEDS BY RX/DR IN RCRD: CPT | Performed by: FAMILY MEDICINE

## 2024-12-12 PROCEDURE — 87880 STREP A ASSAY W/OPTIC: CPT | Performed by: FAMILY MEDICINE

## 2024-12-12 RX ORDER — BROMPHENIRAMINE MALEATE, PSEUDOEPHEDRINE HYDROCHLORIDE, AND DEXTROMETHORPHAN HYDROBROMIDE 2; 30; 10 MG/5ML; MG/5ML; MG/5ML
5 SYRUP ORAL 4 TIMES DAILY PRN
Qty: 118 ML | Refills: 1 | Status: SHIPPED | OUTPATIENT
Start: 2024-12-12 | End: 2024-12-19

## 2024-12-12 RX ORDER — AMOXICILLIN 875 MG/1
875 TABLET, COATED ORAL 2 TIMES DAILY
Qty: 20 TABLET | Refills: 0 | Status: SHIPPED | OUTPATIENT
Start: 2024-12-12 | End: 2024-12-22

## 2024-12-12 RX ORDER — DEXTROAMPHETAMINE SACCHARATE, AMPHETAMINE ASPARTATE, DEXTROAMPHETAMINE SULFATE AND AMPHETAMINE SULFATE 1.875; 1.875; 1.875; 1.875 MG/1; MG/1; MG/1; MG/1
TABLET ORAL
Qty: 30 TABLET | Refills: 0 | Status: SHIPPED | OUTPATIENT
Start: 2025-01-05

## 2024-12-12 RX ORDER — LISDEXAMFETAMINE DIMESYLATE 30 MG/1
30 CAPSULE ORAL EVERY MORNING
Qty: 30 CAPSULE | Refills: 0 | Status: SHIPPED | OUTPATIENT
Start: 2025-01-05

## 2024-12-12 RX ORDER — FLUTICASONE PROPIONATE 50 MCG
1 SPRAY, SUSPENSION (ML) NASAL DAILY
Qty: 16 G | Refills: 0 | Status: SHIPPED | OUTPATIENT
Start: 2024-12-12 | End: 2024-12-22

## 2024-12-12 RX ORDER — IBUPROFEN 600 MG/1
600 TABLET, FILM COATED ORAL 2 TIMES DAILY PRN
Qty: 20 TABLET | Refills: 0 | Status: SHIPPED | OUTPATIENT
Start: 2024-12-12 | End: 2024-12-22

## 2024-12-12 NOTE — LETTER
December 12, 2024     Patient: Mayo Landers   YOB: 1990   Date of Visit: 12/12/2024       To Whom It May Concern:    It is my medical opinion that Mayo Landers may return to work in two days.         Sincerely,        Francisco Javier Miller,     CC: No Recipients

## 2024-12-12 NOTE — PATIENT INSTRUCTIONS
Labs are still active, you can have completed at any time as long as you are fasting at least 8 hours prior to drawn  Refills for the Vyvanse and Adderall were sent in for January, you just need to request from the pharmacy as it comes time  If not improving by tomorrow start amoxicillin  Do percussion massager on sinuses and posterior chest

## 2024-12-12 NOTE — PROGRESS NOTES
Established Patient Office Visit      Patient Name: Mayo Landers  : 1990   MRN: [id number]   Care Team: Patient Care Team:  Francisco Javier Miller DO as PCP - General (Family Medicine)  Provider, No Known    Chief Complaint:    Chief Complaint   Patient presents with    Sore Throat     White patches on his throat and redness and painful to swallow , ongoing for 3 days        History of Present Illness: Mayo Landers is a 34 y.o. male who is here today for routine controlled substance monitoring.    HPI    Mayo Galdamezresents for follow-up on ADHD.  Due to being on a long-term controlled substance/stimulant, they are seen every 3 months for monitoring and reevaluation. he reports that his medication is doing well at the current dosage. he denies any new side effects including unintentional weight loss, anorexia, heart palpitations, or increased anxiety.  They feel that they are still getting substantial benefit from active treatment.    He reports a little bit of a sore throat over the last couple days.  Taking over-the-counter's without much relief.  Will do a strep test today.    The following portions of the patient's history were reviewed and updated as appropriate: allergies, current medications, past family history, past medical history, past social history, past surgical history and problem list.    Subjective      Review of Systems:   Review of Systems - See HPI    Past Medical History:   Past Medical History:   Diagnosis Date    Chronic bronchitis     DUe to vaping previously, no medications, no chronic issues    Fatty liver     Fracture        Past Surgical History:   Past Surgical History:   Procedure Laterality Date    EAR TUBES         Family History:   Family History   Problem Relation Age of Onset    Breast cancer Mother     Hyperlipidemia Father     Hypertension Father     No Known Problems Sister     Obesity Brother     Klinefelter's syndrome Brother     Breast cancer Maternal Grandmother         Social History:   Social History     Socioeconomic History    Marital status: Single   Tobacco Use    Smoking status: Every Day     Current packs/day: 0.00     Types: Electronic Cigarette, Cigarettes    Smokeless tobacco: Never   Vaping Use    Vaping status: Every Day    Substances: Nicotine    Devices: Disposable    Passive vaping exposure: Yes   Substance and Sexual Activity    Alcohol use: Yes     Comment: Ocassionally    Drug use: No       Tobacco History:   Social History     Tobacco Use   Smoking Status Every Day    Current packs/day: 0.00    Types: Electronic Cigarette, Cigarettes   Smokeless Tobacco Never       Medications:     Current Outpatient Medications:     albuterol sulfate HFA (Ventolin HFA) 108 (90 Base) MCG/ACT inhaler, INHALE 2 PUFFS EVERY 4 HOURS AS NEEDED FOR WHEEZING, Disp: 18 g, Rfl: 1    [START ON 1/5/2025] amphetamine-dextroamphetamine (Adderall) 7.5 MG tablet, 1 tab PO every afternoon, Disp: 30 tablet, Rfl: 0    varenicline (Chantix) 1 MG tablet, Take 1 tablet by mouth 2 (Two) Times a Day for 90 days., Disp: 180 tablet, Rfl: 0    vitamin B-12 (CYANOCOBALAMIN) 1000 MCG tablet, Take 1 tablet by mouth Daily., Disp: 90 tablet, Rfl: 3    vitamin D (ERGOCALCIFEROL) 1.25 MG (52850 UT) capsule capsule, TAKE 1 CAPSULE BY MOUTH 1 TIME EVERY WEEK FOR 12 DOSES, Disp: 12 capsule, Rfl: 0    [START ON 1/5/2025] Vyvanse 30 MG capsule, Take 1 capsule by mouth Every Morning, Disp: 30 capsule, Rfl: 0    amoxicillin (AMOXIL) 875 MG tablet, Take 1 tablet by mouth 2 (Two) Times a Day for 10 days., Disp: 20 tablet, Rfl: 0    brompheniramine-pseudoephedrine-DM 30-2-10 MG/5ML syrup, Take 5 mL by mouth 4 (Four) Times a Day As Needed for Allergies (mucous) for up to 7 days., Disp: 118 mL, Rfl: 1    fluticasone (FLONASE) 50 MCG/ACT nasal spray, Administer 1 spray into the nostril(s) as directed by provider Daily for 10 days., Disp: 16 g, Rfl: 0    ibuprofen (ADVIL,MOTRIN) 600 MG tablet, Take 1 tablet by mouth 2  "(Two) Times a Day As Needed for Fever or Moderate Pain for up to 10 days., Disp: 20 tablet, Rfl: 0    Allergies:   No Known Allergies    Objective   Objective     Physical Exam:  Vital Signs:   Vitals:    12/12/24 0816   BP: 122/75   Pulse: 64   SpO2: 97%   Weight: 78.6 kg (173 lb 4.8 oz)   Height: 175.3 cm (69.02\")     Body mass index is 25.58 kg/m².     Physical Exam  Vitals and nursing note reviewed.   Constitutional:       General: He is not in acute distress.     Appearance: He is well-developed.   HENT:      Head: Normocephalic and atraumatic.      Right Ear: A middle ear effusion is present.      Left Ear: Tympanic membrane is injected and erythematous.      Mouth/Throat:      Pharynx: Oropharyngeal exudate present.   Eyes:      Pupils: Pupils are equal, round, and reactive to light.   Cardiovascular:      Rate and Rhythm: Normal rate and regular rhythm.   Pulmonary:      Effort: Pulmonary effort is normal.      Breath sounds: Normal breath sounds.   Musculoskeletal:      Cervical back: Normal range of motion and neck supple.   Lymphadenopathy:      Cervical: Cervical adenopathy present.   Skin:     General: Skin is warm and dry.   Neurological:      Mental Status: He is alert and oriented to person, place, and time.       Nursing note reviewed    PHQ-9 Depression Screening           Assessment & Plan   Assessment / Plan      Assessment/Plan:   Problems Addressed This Visit  Diagnoses and all orders for this visit:    1. Sore throat (Primary)  -     POCT rapid strep A  -     ibuprofen (ADVIL,MOTRIN) 600 MG tablet; Take 1 tablet by mouth 2 (Two) Times a Day As Needed for Fever or Moderate Pain for up to 10 days.  Dispense: 20 tablet; Refill: 0  -     amoxicillin (AMOXIL) 875 MG tablet; Take 1 tablet by mouth 2 (Two) Times a Day for 10 days.  Dispense: 20 tablet; Refill: 0  -     brompheniramine-pseudoephedrine-DM 30-2-10 MG/5ML syrup; Take 5 mL by mouth 4 (Four) Times a Day As Needed for Allergies (mucous) for " up to 7 days.  Dispense: 118 mL; Refill: 1  -     fluticasone (FLONASE) 50 MCG/ACT nasal spray; Administer 1 spray into the nostril(s) as directed by provider Daily for 10 days.  Dispense: 16 g; Refill: 0    2. Attention deficit hyperactivity disorder (ADHD), predominantly hyperactive type  Assessment & Plan:  Vyvanse 30mg plus Adderall 7.5mg in afternoon has been very beneficial  -Refill provided today for next month, urine drug screen completed at his last visit, controlled substance agreement up-to-date PDMP reviewed    Orders:  -     amphetamine-dextroamphetamine (Adderall) 7.5 MG tablet; 1 tab PO every afternoon  Dispense: 30 tablet; Refill: 0  -     Vyvanse 30 MG capsule; Take 1 capsule by mouth Every Morning  Dispense: 30 capsule; Refill: 0      Problem List Items Addressed This Visit          Mental Health    Attention deficit hyperactivity disorder (ADHD), predominantly hyperactive type    Overview     Formulary change from Adderall XR 10mg to Vyvanse 30mg daily in August 2021         Current Assessment & Plan     Vyvanse 30mg plus Adderall 7.5mg in afternoon has been very beneficial  -Refill provided today for next month, urine drug screen completed at his last visit, controlled substance agreement up-to-date PDMP reviewed         Relevant Medications    amphetamine-dextroamphetamine (Adderall) 7.5 MG tablet (Start on 1/5/2025)    Vyvanse 30 MG capsule (Start on 1/5/2025)     Other Visit Diagnoses       Sore throat    -  Primary    Relevant Medications    ibuprofen (ADVIL,MOTRIN) 600 MG tablet    amoxicillin (AMOXIL) 875 MG tablet    brompheniramine-pseudoephedrine-DM 30-2-10 MG/5ML syrup    fluticasone (FLONASE) 50 MCG/ACT nasal spray    Other Relevant Orders    POCT rapid strep A (Completed)            Chart reviewed for controlled substance agreement and most recent urine drug screen.  [place]/PDMP reviewed and appropriate. No signs of abuse or diversion, no new concerns from patient.  Refills provided  as indicated or previously completed.  -Anxiety rating scale (HAM-A), PHQ-9, and adult ADHD self-report scale updated and reviewed yearly  -Patient counseled on adjuvant nonpharmacologic treatments for ADHD including exercise, low sugar diet, Rhodiola supplement OTC, CBT/behavioral health referral, meditation, and adequate sleep.    Rapid strep test negative today, continue home OTC PRN.    Patient Instructions   Labs are still active, you can have completed at any time as long as you are fasting at least 8 hours prior to drawn  Refills for the Vyvanse and Adderall were sent in for January, you just need to request from the pharmacy as it comes time  If not improving by tomorrow start amoxicillin  Do percussion massager on sinuses and posterior chest    Follow Up:   Return in about 3 months (around 3/12/2025) for Controlled substance 3-month, (fasting blood work 1 week prior to appt).    DO [address]  [facility]  [id number]  [facility] [phone]  Fax [phone]  Phone [phone]

## 2024-12-12 NOTE — ASSESSMENT & PLAN NOTE
Second round of chantix worked extremely well, has barely vaped or smoked at all in the last month

## 2024-12-12 NOTE — ASSESSMENT & PLAN NOTE
Vyvanse 30mg plus Adderall 7.5mg in afternoon has been very beneficial  -Refill provided today for next month, urine drug screen completed at his last visit, controlled substance agreement up-to-date PDMP reviewed

## 2024-12-13 RX ORDER — FLUTICASONE PROPIONATE 50 MCG
SPRAY, SUSPENSION (ML) NASAL
Qty: 48 G | OUTPATIENT
Start: 2024-12-13

## 2025-01-02 DIAGNOSIS — F90.1 ATTENTION DEFICIT HYPERACTIVITY DISORDER (ADHD), PREDOMINANTLY HYPERACTIVE TYPE: ICD-10-CM

## 2025-01-02 DIAGNOSIS — F17.200 SMOKER: ICD-10-CM

## 2025-01-02 NOTE — TELEPHONE ENCOUNTER
Caller: Mayo Landers    Relationship: Self    Best call back number: [phone]     Requested Prescriptions:   Requested Prescriptions     Pending Prescriptions Disp Refills    Vyvanse 30 MG capsule 30 capsule 0     Sig: Take 1 capsule by mouth Every Morning    amphetamine-dextroamphetamine (Adderall) 7.5 MG tablet 30 tablet 0     Si tab PO every afternoon    varenicline (Chantix) 1 MG tablet 180 tablet 0     Sig: Take 1 tablet by mouth 2 (Two) Times a Day for 90 days.        [id number] [place] [id number] [facility] - [phone] [facility] [phone] FX     Last office visit with prescribing clinician: 2024   Last telemedicine visit with prescribing clinician: Visit date not found   Next office visit with prescribing clinician: 3/12/2025     Additional details provided by patient: PATIENT HAS 2 DAYS LEFT OF MEDICATION     Does the patient have less than a 3 day supply:  [x] Yes      Would you like a call back once the refill request has been completed: [] Yes [x] No    If the office needs to give you a call back, can they leave a voicemail: [] Yes [x] No    Gricel Red MA   25 16:10 EST

## 2025-01-03 RX ORDER — VARENICLINE TARTRATE 1 MG/1
1 TABLET, FILM COATED ORAL 2 TIMES DAILY
Qty: 180 TABLET | Refills: 0 | Status: SHIPPED | OUTPATIENT
Start: 2025-01-03 | End: 2025-04-03

## 2025-01-03 RX ORDER — LISDEXAMFETAMINE DIMESYLATE 30 MG/1
30 CAPSULE ORAL EVERY MORNING
Qty: 30 CAPSULE | Refills: 0 | Status: SHIPPED | OUTPATIENT
Start: 2025-01-05

## 2025-01-03 RX ORDER — DEXTROAMPHETAMINE SACCHARATE, AMPHETAMINE ASPARTATE, DEXTROAMPHETAMINE SULFATE AND AMPHETAMINE SULFATE 1.875; 1.875; 1.875; 1.875 MG/1; MG/1; MG/1; MG/1
TABLET ORAL
Qty: 30 TABLET | Refills: 0 | Status: SHIPPED | OUTPATIENT
Start: 2025-01-05

## 2025-02-03 ENCOUNTER — OFFICE VISIT (OUTPATIENT)
Dept: FAMILY MEDICINE CLINIC | Facility: CLINIC | Age: 35
End: 2025-02-03
Payer: COMMERCIAL

## 2025-02-03 VITALS
HEART RATE: 109 BPM | WEIGHT: 168 LBS | DIASTOLIC BLOOD PRESSURE: 70 MMHG | BODY MASS INDEX: 24.88 KG/M2 | SYSTOLIC BLOOD PRESSURE: 110 MMHG | HEIGHT: 69 IN | OXYGEN SATURATION: 97 %

## 2025-02-03 DIAGNOSIS — R05.1 ACUTE COUGH: ICD-10-CM

## 2025-02-03 DIAGNOSIS — F90.9 ATTENTION DEFICIT HYPERACTIVITY DISORDER (ADHD), UNSPECIFIED ADHD TYPE: ICD-10-CM

## 2025-02-03 DIAGNOSIS — J10.1 INFLUENZA A: Primary | ICD-10-CM

## 2025-02-03 DIAGNOSIS — F90.1 ATTENTION DEFICIT HYPERACTIVITY DISORDER (ADHD), PREDOMINANTLY HYPERACTIVE TYPE: ICD-10-CM

## 2025-02-03 LAB
EXPIRATION DATE: ABNORMAL
EXPIRATION DATE: NORMAL
FLUAV AG UPPER RESP QL IA.RAPID: DETECTED
FLUBV AG UPPER RESP QL IA.RAPID: NOT DETECTED
INTERNAL CONTROL: ABNORMAL
INTERNAL CONTROL: NORMAL
Lab: ABNORMAL
Lab: NORMAL
S PYO AG THROAT QL: NEGATIVE
SARS-COV-2 AG UPPER RESP QL IA.RAPID: NOT DETECTED

## 2025-02-03 PROCEDURE — 99213 OFFICE O/P EST LOW 20 MIN: CPT | Performed by: PHYSICIAN ASSISTANT

## 2025-02-03 PROCEDURE — 87428 SARSCOV & INF VIR A&B AG IA: CPT | Performed by: PHYSICIAN ASSISTANT

## 2025-02-03 PROCEDURE — 87880 STREP A ASSAY W/OPTIC: CPT | Performed by: PHYSICIAN ASSISTANT

## 2025-02-03 RX ORDER — DEXTROAMPHETAMINE SACCHARATE, AMPHETAMINE ASPARTATE, DEXTROAMPHETAMINE SULFATE AND AMPHETAMINE SULFATE 1.875; 1.875; 1.875; 1.875 MG/1; MG/1; MG/1; MG/1
TABLET ORAL
Qty: 30 TABLET | Refills: 0 | Status: SHIPPED | OUTPATIENT
Start: 2025-02-03

## 2025-02-03 RX ORDER — OSELTAMIVIR PHOSPHATE 75 MG/1
75 CAPSULE ORAL 2 TIMES DAILY
Qty: 10 CAPSULE | Refills: 0 | Status: SHIPPED | OUTPATIENT
Start: 2025-02-03 | End: 2025-02-08

## 2025-02-03 RX ORDER — LISDEXAMFETAMINE DIMESYLATE 30 MG/1
30 CAPSULE ORAL EVERY MORNING
Qty: 30 CAPSULE | Refills: 0 | Status: SHIPPED | OUTPATIENT
Start: 2025-02-03

## 2025-02-03 NOTE — PROGRESS NOTES
"    Chief Complaint   Patient presents with    Cough     Started Saturday cough, congestion, muscle weakness, chest pain, cold chills,         HPI     Mayo Landers is a pleasant 34 y.o. male who presents for evaluation of \"chief complaint.\"     Patient of Dr. Miller.   He c/o body aches, cough, fever with max temp 100 last night, brain fog starting 2 days ago. He works as a . Current tobacco smoker. Tends to get bronchitis with URIs. Admits to some shortness of breath and wheezing for which an albuterol inhaler has been helpful.   Requesting refill of Vyvanse and Adderall. Takes Vyvanse in the morning and Adderall in the afternoon. Working well for focus and attention. He denies issues with side-effects.     Past Medical History:   Diagnosis Date    Chronic bronchitis     DUe to vaping previously, no medications, no chronic issues    Fatty liver     Fracture        Past Surgical History:   Procedure Laterality Date    EAR TUBES         Family History   Problem Relation Age of Onset    Breast cancer Mother     Hyperlipidemia Father     Hypertension Father     No Known Problems Sister     Obesity Brother     Klinefelter's syndrome Brother     Breast cancer Maternal Grandmother        Social History     Socioeconomic History    Marital status: Single   Tobacco Use    Smoking status: Every Day     Current packs/day: 0.00     Types: Electronic Cigarette, Cigarettes    Smokeless tobacco: Never   Vaping Use    Vaping status: Every Day    Substances: Nicotine    Devices: Disposable    Passive vaping exposure: Yes   Substance and Sexual Activity    Alcohol use: Yes     Comment: Ocassionally    Drug use: No       No Known Allergies    ROS    Review of Systems   Constitutional:  Positive for chills, diaphoresis and fever.   HENT:  Positive for congestion.    Respiratory:  Positive for cough. Negative for shortness of breath and wheezing.    Cardiovascular:  Negative for palpitations.   Neurological:  Negative for " headache.       Vitals:    02/03/25 1310   BP: 110/70   Pulse: 109   SpO2: 97%     Body mass index is 24.8 kg/m².      Current Outpatient Medications:     albuterol sulfate HFA (Ventolin HFA) 108 (90 Base) MCG/ACT inhaler, INHALE 2 PUFFS EVERY 4 HOURS AS NEEDED FOR WHEEZING, Disp: 18 g, Rfl: 1    amphetamine-dextroamphetamine (Adderall) 7.5 MG tablet, 1 tab PO every afternoon, Disp: 30 tablet, Rfl: 0    varenicline (Chantix) 1 MG tablet, Take 1 tablet by mouth 2 (Two) Times a Day for 90 days., Disp: 180 tablet, Rfl: 0    Vyvanse 30 MG capsule, Take 1 capsule by mouth Every Morning, Disp: 30 capsule, Rfl: 0    fluticasone (FLONASE) 50 MCG/ACT nasal spray, Administer 1 spray into the nostril(s) as directed by provider Daily for 10 days., Disp: 16 g, Rfl: 0    oseltamivir (Tamiflu) 75 MG capsule, Take 1 capsule by mouth 2 (Two) Times a Day for 5 days., Disp: 10 capsule, Rfl: 0    vitamin B-12 (CYANOCOBALAMIN) 1000 MCG tablet, Take 1 tablet by mouth Daily. (Patient not taking: Reported on 2/3/2025), Disp: 90 tablet, Rfl: 3    vitamin D (ERGOCALCIFEROL) 1.25 MG (15082 UT) capsule capsule, TAKE 1 CAPSULE BY MOUTH 1 TIME EVERY WEEK FOR 12 DOSES (Patient not taking: Reported on 2/3/2025), Disp: 12 capsule, Rfl: 0    PE    Physical Exam  Vitals reviewed.   Constitutional:       General: He is not in acute distress.     Appearance: He is well-developed.   HENT:      Head: Normocephalic.      Right Ear: Tympanic membrane and ear canal normal. Tympanic membrane is not erythematous.      Left Ear: Tympanic membrane and ear canal normal. Tympanic membrane is not erythematous.      Nose:      Right Sinus: No maxillary sinus tenderness or frontal sinus tenderness.      Left Sinus: No maxillary sinus tenderness or frontal sinus tenderness.      Mouth/Throat:      Mouth: Mucous membranes are moist.      Pharynx: Uvula midline.      Tonsils: No tonsillar exudate.   Eyes:      General:         Right eye: No discharge.         Left eye:  No discharge.      Conjunctiva/sclera: Conjunctivae normal.   Cardiovascular:      Rate and Rhythm: Normal rate and regular rhythm.      Heart sounds: Normal heart sounds.   Pulmonary:      Effort: Pulmonary effort is normal. No respiratory distress.      Breath sounds: Normal breath sounds. No wheezing, rhonchi or rales.   Musculoskeletal:      Cervical back: Normal range of motion and neck supple.   Lymphadenopathy:      Cervical: No cervical adenopathy.      Upper Body:      Right upper body: No supraclavicular adenopathy.      Left upper body: No supraclavicular adenopathy.   Skin:     General: Skin is warm and dry.   Psychiatric:         Behavior: Behavior normal.          A/P    Problem List Items Addressed This Visit    None  Visit Diagnoses       Influenza A    -  Primary    Relevant Medications    oseltamivir (Tamiflu) 75 MG capsule    Acute cough        Relevant Orders    POCT SARS-CoV-2 + Flu Antigen LUCIEN    POC Rapid Strep A    Attention deficit hyperactivity disorder (ADHD), unspecified ADHD type              -Positive for influenza A today. Start tamiflu and continue albuterol prn for wheezing, soa. We also discussed symptomatic/supportive measures. Work note provided.   -Doing well on Vyvanse and Adderall. UDS is up to date. His CSA will be updated. I will forward refills of his stimulants to Dr. Harris for approval since Dr. iMller is out of the office. Follow-up with Dr. Miller for ADHD as scheduled on 3/12, sooner for worsening respiratory symptoms.     Plan of care was reviewed with patient at the conclusion of today's visit. Education was provided regarding diagnoses, management, prescribed or recommended OTC products, and the importance of compliance with follow-up appointments. The patient was counseled regarding the risks, benefits, and possible side-effects of treatment. I advised the patient to keep me informed of any acute changes in their status including new, worsening, or persistent  symptoms. Patient expresses understanding and agreement with the management plan.        Jono Travis PA-C

## 2025-02-03 NOTE — PATIENT INSTRUCTIONS
"Attain adequate rest and increase clear fluid intake.   Start vitamin C 500-1000 mg daily.   Use Zinc lozenges or chewable tablets (generic for Zicam) in the early course of your illness.   Practice regular hand hygiene and cover your cough to help prevent spread of infection  Use warm salt water gargles, Cepacol lozenges, and hot tea with honey as needed for throat comfort.   Use Mucinex 600 mg twice daily and \"ocean\" or \"simply saline\" brand sterile nasal spray twice daily.   Use warm compresses over sinuses for comfort as needed  Alternate taking Tylenol 500 mg and Ibuprofen 400 mg every 4 hours as needed for headache, body aches, throat pain, and/or fever reduction.  Please return if symptoms worsen or persist for more than 7-10 days.    "

## 2025-02-03 NOTE — LETTER
February 3, 2025     Patient: Mayo Landers   YOB: 1990   Date of Visit: 2/3/2025       To Whom It May Concern:    It is my medical opinion that Mayo Landers may return to work in three days.            Sincerely,        Jono Travis PA-C    CC: No Recipients

## 2025-02-07 RX ORDER — OSELTAMIVIR PHOSPHATE 75 MG/1
75 CAPSULE ORAL 2 TIMES DAILY
Qty: 10 CAPSULE | Refills: 0 | OUTPATIENT
Start: 2025-02-07 | End: 2025-02-12

## 2025-02-07 NOTE — TELEPHONE ENCOUNTER
Caller: Mayo Landers    Relationship: Self    Best call back number: [phone]     Requested Prescriptions:   Requested Prescriptions     Pending Prescriptions Disp Refills    oseltamivir (Tamiflu) 75 MG capsule 10 capsule 0     Sig: Take 1 capsule by mouth 2 (Two) Times a Day for 5 days.        Pharmacy where request should be sent: [facility] #[id number] - [address] [phone] [facility] [phone]      Last office visit with prescribing clinician: 12/12/2024   Last telemedicine visit with prescribing clinician: Visit date not found   Next office visit with prescribing clinician: 3/12/2025     Additional details provided by patient:     Does the patient have less than a 3 day supply:  [x] Yes  [] No    Would you like a call back once the refill request has been completed: [] Yes [x] No    If the office needs to give you a call back, can they leave a voicemail: [] Yes [x] No    Cadance Dunaway, RegSched Rep   02/07/25 12:44 EST

## 2025-02-28 DIAGNOSIS — F90.1 ATTENTION DEFICIT HYPERACTIVITY DISORDER (ADHD), PREDOMINANTLY HYPERACTIVE TYPE: ICD-10-CM

## 2025-02-28 RX ORDER — LISDEXAMFETAMINE DIMESYLATE 30 MG/1
30 CAPSULE ORAL EVERY MORNING
Qty: 30 CAPSULE | Refills: 0 | Status: SHIPPED | OUTPATIENT
Start: 2025-02-28

## 2025-02-28 RX ORDER — DEXTROAMPHETAMINE SACCHARATE, AMPHETAMINE ASPARTATE, DEXTROAMPHETAMINE SULFATE AND AMPHETAMINE SULFATE 1.875; 1.875; 1.875; 1.875 MG/1; MG/1; MG/1; MG/1
TABLET ORAL
Qty: 30 TABLET | Refills: 0 | Status: SHIPPED | OUTPATIENT
Start: 2025-02-28

## 2025-02-28 NOTE — TELEPHONE ENCOUNTER
Caller: Mayo Landers    Relationship: Self    Best call back number: [phone]     Requested Prescriptions:   Requested Prescriptions     Pending Prescriptions Disp Refills    Vyvanse 30 MG capsule 30 capsule 0     Sig: Take 1 capsule by mouth Every Morning    amphetamine-dextroamphetamine (Adderall) 7.5 MG tablet 30 tablet 0     Si tab PO every afternoon        Pharmacy where request [facility] be sent: [id number] - [address] [phone] [facility] [phone]      Last office visit with prescribing clinician: 2024   Last telemedicine visit with prescribing clinician: Visit date not found   Next office visit with prescribing clinician: 3/12/2025     HAS 3 DAYS LEFT    Does the patient have less than a 3 day supply:  [] Yes  [x] No    Would you like a call back once the refill request has been completed: [] Yes [x] No    If the office needs to give you a call back, can they leave a voicemail: [] Yes [x] No    Kali Zavala Rep   25 12:09 EST

## 2025-04-02 DIAGNOSIS — F90.1 ATTENTION DEFICIT HYPERACTIVITY DISORDER (ADHD), PREDOMINANTLY HYPERACTIVE TYPE: ICD-10-CM

## 2025-04-02 RX ORDER — DEXTROAMPHETAMINE SACCHARATE, AMPHETAMINE ASPARTATE, DEXTROAMPHETAMINE SULFATE AND AMPHETAMINE SULFATE 1.875; 1.875; 1.875; 1.875 MG/1; MG/1; MG/1; MG/1
TABLET ORAL
Qty: 30 TABLET | Refills: 0 | Status: SHIPPED | OUTPATIENT
Start: 2025-04-02

## 2025-04-02 RX ORDER — LISDEXAMFETAMINE DIMESYLATE 30 MG/1
30 CAPSULE ORAL EVERY MORNING
Qty: 30 CAPSULE | Refills: 0 | Status: SHIPPED | OUTPATIENT
Start: 2025-04-02

## 2025-04-02 NOTE — TELEPHONE ENCOUNTER
Caller: Mayo Landers    Relationship: Self    Best call back number:       [phone] (Mobile)     Requested Prescriptions:   Requested Prescriptions     Pending Prescriptions Disp Refills    Vyvanse 30 MG capsule 30 capsule 0     Sig: Take 1 capsule by mouth Every Morning    amphetamine-dextroamphetamine (Adderall) 7.5 MG tablet 30 tablet 0     Si tab PO every afternoon     Pharmacy where request should be sent:     [facility] #[id number] - [address] [phone] [facility] [phone] FX     Last office visit with prescribing clinician: 2024   Last telemedicine visit with prescribing clinician: Visit date not found   Next office visit with prescribing clinician: 4/3/2025     Additional details provided by patient:     PATIENT STATED HE HAS (1) DOSE LEFT FOR BOTH MEDICATIONS    Does the patient have less than a 3 day supply:  [x] Yes  [] No    Would you like a call back once the refill request has been completed: [] Yes [] No    If the office needs to give you a call back, can they leave a voicemail: [] Yes [] No    Kali Bar Rep   25 09:13 EDT

## 2025-04-03 ENCOUNTER — TELEPHONE (OUTPATIENT)
Dept: FAMILY MEDICINE CLINIC | Facility: CLINIC | Age: 35
End: 2025-04-03

## 2025-04-30 DIAGNOSIS — F90.1 ATTENTION DEFICIT HYPERACTIVITY DISORDER (ADHD), PREDOMINANTLY HYPERACTIVE TYPE: ICD-10-CM

## 2025-04-30 NOTE — TELEPHONE ENCOUNTER
Caller: Mayo Landers    Relationship: Self    Best call back number: [phone]     Requested Prescriptions:   Requested Prescriptions     Pending Prescriptions Disp Refills    Vyvanse 30 MG capsule 30 capsule 0     Sig: Take 1 capsule by mouth Every Morning    amphetamine-dextroamphetamine (Adderall) 7.5 MG tablet 30 tablet 0     Si tab PO every afternoon        Pharmacy where request should be sent: [facility] #[id number] - [address] [phone] [facility] [phone]      Last office visit with prescribing clinician: 2024   Last telemedicine visit with prescribing clinician: Visit date not found   Next office visit with prescribing clinician: 2025         Does the patient have less than a 3 day supply:  [x] Yes  [] No    Would you like a call back once the refill request has been completed: [] Yes [x] No    If the office needs to give you a call back, can they leave a voicemail: [] Yes [x] No    Kali Serrano Rep   25 09:29 EDT

## 2025-05-01 RX ORDER — LISDEXAMFETAMINE DIMESYLATE 30 MG/1
30 CAPSULE ORAL EVERY MORNING
Qty: 30 CAPSULE | Refills: 0 | Status: SHIPPED | OUTPATIENT
Start: 2025-05-01

## 2025-05-01 RX ORDER — DEXTROAMPHETAMINE SACCHARATE, AMPHETAMINE ASPARTATE, DEXTROAMPHETAMINE SULFATE AND AMPHETAMINE SULFATE 1.875; 1.875; 1.875; 1.875 MG/1; MG/1; MG/1; MG/1
TABLET ORAL
Qty: 30 TABLET | Refills: 0 | Status: SHIPPED | OUTPATIENT
Start: 2025-05-01

## 2025-05-22 ENCOUNTER — OFFICE VISIT (OUTPATIENT)
Dept: FAMILY MEDICINE CLINIC | Facility: CLINIC | Age: 35
End: 2025-05-22
Payer: MEDICAID

## 2025-05-22 VITALS
DIASTOLIC BLOOD PRESSURE: 72 MMHG | HEIGHT: 69 IN | BODY MASS INDEX: 26.01 KG/M2 | OXYGEN SATURATION: 100 % | WEIGHT: 175.6 LBS | HEART RATE: 89 BPM | SYSTOLIC BLOOD PRESSURE: 116 MMHG

## 2025-05-22 DIAGNOSIS — Z13.220 SCREENING FOR HYPERLIPIDEMIA: ICD-10-CM

## 2025-05-22 DIAGNOSIS — J01.90 ACUTE NON-RECURRENT SINUSITIS, UNSPECIFIED LOCATION: ICD-10-CM

## 2025-05-22 DIAGNOSIS — Z11.59 ENCOUNTER FOR HEPATITIS C SCREENING TEST FOR LOW RISK PATIENT: ICD-10-CM

## 2025-05-22 DIAGNOSIS — E55.9 VITAMIN D DEFICIENCY: ICD-10-CM

## 2025-05-22 DIAGNOSIS — E53.8 B12 DEFICIENCY: ICD-10-CM

## 2025-05-22 DIAGNOSIS — F90.1 ATTENTION DEFICIT HYPERACTIVITY DISORDER (ADHD), PREDOMINANTLY HYPERACTIVE TYPE: Primary | ICD-10-CM

## 2025-05-22 DIAGNOSIS — Z13.29 SCREENING FOR ENDOCRINE DISORDER: ICD-10-CM

## 2025-05-22 DIAGNOSIS — Z13.89 SCREENING FOR BLOOD OR PROTEIN IN URINE: ICD-10-CM

## 2025-05-22 DIAGNOSIS — Z00.00 PREVENTATIVE HEALTH CARE: ICD-10-CM

## 2025-05-22 DIAGNOSIS — Z13.0 SCREENING FOR DEFICIENCY ANEMIA: ICD-10-CM

## 2025-05-22 PROCEDURE — 99214 OFFICE O/P EST MOD 30 MIN: CPT | Performed by: FAMILY MEDICINE

## 2025-05-22 PROCEDURE — 1126F AMNT PAIN NOTED NONE PRSNT: CPT | Performed by: FAMILY MEDICINE

## 2025-05-22 RX ORDER — LANOLIN ALCOHOL/MO/W.PET/CERES
1000 CREAM (GRAM) TOPICAL DAILY
Qty: 90 TABLET | Refills: 3 | Status: SHIPPED | OUTPATIENT
Start: 2025-05-22

## 2025-05-22 RX ORDER — DEXTROAMPHETAMINE SACCHARATE, AMPHETAMINE ASPARTATE, DEXTROAMPHETAMINE SULFATE AND AMPHETAMINE SULFATE 1.875; 1.875; 1.875; 1.875 MG/1; MG/1; MG/1; MG/1
TABLET ORAL
Qty: 30 TABLET | Refills: 0 | Status: SHIPPED | OUTPATIENT
Start: 2025-05-22

## 2025-05-22 RX ORDER — LISDEXAMFETAMINE DIMESYLATE 30 MG/1
30 CAPSULE ORAL EVERY MORNING
Qty: 30 CAPSULE | Refills: 0 | Status: SHIPPED | OUTPATIENT
Start: 2025-05-22

## 2025-05-22 RX ORDER — ERGOCALCIFEROL 1.25 MG/1
50000 CAPSULE, LIQUID FILLED ORAL
Qty: 12 CAPSULE | Refills: 3 | Status: SHIPPED | OUTPATIENT
Start: 2025-05-22

## 2025-05-22 NOTE — ASSESSMENT & PLAN NOTE
Could consider changing the adderall IR afternoon to ritalin or increasing vyvanse since the adderall does make him a little irritable. Fine for now though  CSA updated today  F/u 3 months

## 2025-05-22 NOTE — PATIENT INSTRUCTIONS
Try a neti pot the next few days  Antibiotic if needed    I would like you to have your labs done about a week prior to your next appointment.  You do not need an appointment, but I would advise to call our office a few days before you plan to have your labs done to confirm that orders are in and active.  We will discuss the results at your next scheduled visit.  -Lab services are available at any [facility] outpatient lab center, including across the street at [address] [place] [address] Dr     Your labs should be done when you're in a fasting state.  This means you should not have anything with calories for at least 10 hours prior to the blood draw. Water and black coffee are fine. Both blood sugar and triglyceride levels rise after meals, and this may impact your results. Hormone levels can also change, so unless you are specifically told you do not have to fast for labs, it is better to be fasting.    Additionally, stop any multivitamins or B vitamins (especially biotin) at least 48 hours before your labs since these can interfere with accurate measurement.    No

## 2025-06-02 DIAGNOSIS — F90.1 ATTENTION DEFICIT HYPERACTIVITY DISORDER (ADHD), PREDOMINANTLY HYPERACTIVE TYPE: ICD-10-CM

## 2025-06-02 RX ORDER — LISDEXAMFETAMINE DIMESYLATE 30 MG/1
30 CAPSULE ORAL EVERY MORNING
Qty: 30 CAPSULE | Refills: 0 | OUTPATIENT
Start: 2025-06-02

## 2025-06-02 NOTE — PROGRESS NOTES
Established Patient Office Visit      Patient Name: Mayo Landers  : 1990   MRN: [id number]   Care Team: Patient Care Team:  Francisco Javier Miller DO as PCP - General (Family Medicine)  Provider, No Known    Chief Complaint:    Chief Complaint   Patient presents with    ADHD     Signed csa       History of Present Illness: Mayo Landers is a 35 y.o. male who is here today for routine controlled substance monitoring.    HPI    Mayo Beltranarpresents for follow-up on ADHD.  Due to being on a long-term controlled substance/stimulant, they are seen every 3 months for monitoring and reevaluation. he reports that his medication is doing well at the current dosage. he denies any new side effects including unintentional weight loss, anorexia, heart palpitations, or increased anxiety.  They feel that they are still getting substantial benefit from active treatment.    The following portions of the patient's history were reviewed and updated as appropriate: allergies, current medications, past family history, past medical history, past social history, past surgical history and problem list.    Subjective      Review of Systems:   Review of Systems - See HPI    Past Medical History:   Past Medical History:   Diagnosis Date    Chronic bronchitis     DUe to vaping previously, no medications, no chronic issues    Fatty liver     Fracture        Past Surgical History:   Past Surgical History:   Procedure Laterality Date    EAR TUBES         Family History:   Family History   Problem Relation Age of Onset    Breast cancer Mother     Hyperlipidemia Father     Hypertension Father     No Known Problems Sister     Obesity Brother     Klinefelter's syndrome Brother     Breast cancer Maternal Grandmother        Social History:   Social History     Socioeconomic History    Marital status: Single   Tobacco Use    Smoking status: Every Day     Current packs/day: 0.00     Types: Electronic Cigarette, Cigarettes    Smokeless tobacco:  "Never   Vaping Use    Vaping status: Every Day    Substances: Nicotine    Devices: Disposable    Passive vaping exposure: Yes   Substance and Sexual Activity    Alcohol use: Yes     Comment: Ocassionally    Drug use: No       Tobacco History:   Social History     Tobacco Use   Smoking Status Every Day    Current packs/day: 0.00    Types: Electronic Cigarette, Cigarettes   Smokeless Tobacco Never       Medications:     Current Outpatient Medications:     albuterol sulfate HFA (Ventolin HFA) 108 (90 Base) MCG/ACT inhaler, INHALE 2 PUFFS EVERY 4 HOURS AS NEEDED FOR WHEEZING, Disp: 18 g, Rfl: 1    amphetamine-dextroamphetamine (Adderall) 7.5 MG tablet, 1 tab PO every afternoon, Disp: 30 tablet, Rfl: 0    vitamin B-12 (CYANOCOBALAMIN) 1000 MCG tablet, Take 1 tablet by mouth Daily., Disp: 90 tablet, Rfl: 3    vitamin D (ERGOCALCIFEROL) 1.25 MG (92549 UT) capsule capsule, Take 1 capsule by mouth Every 7 (Seven) Days., Disp: 12 capsule, Rfl: 3    Vyvanse 30 MG capsule, Take 1 capsule by mouth Every Morning, Disp: 30 capsule, Rfl: 0    Allergies:   No Known Allergies    Objective   Objective     Physical Exam:  Vital Signs:   Vitals:    05/22/25 0940   BP: 116/72   Pulse: 89   SpO2: 100%   Weight: 79.7 kg (175 lb 9.6 oz)   Height: 175.3 cm (69.02\")     Body mass index is 25.92 kg/m².     Physical Exam  Nursing note reviewed  Const: NAD, A&Ox4, Pleasant, Cooperative  Eyes: EOMI, no conjunctivitis  ENT: No nasal discharge present, neck supple  Cardiac: Regular rate and rhythm, no cyanosis  Psych: Appropriate mood and behavior.  PHQ-9 Depression Screening           Assessment & Plan   Assessment / Plan      Assessment/Plan:   Problems Addressed This Visit  Diagnoses and all orders for this visit:    1. Attention deficit hyperactivity disorder (ADHD), predominantly hyperactive type (Primary)  Assessment & Plan:  Could consider changing the adderall IR afternoon to ritalin or increasing vyvanse since the adderall does make him a " little irritable. Fine for now though  CSA updated today  F/u 3 months    Orders:  -     amphetamine-dextroamphetamine (Adderall) 7.5 MG tablet; 1 tab PO every afternoon  Dispense: 30 tablet; Refill: 0  -     Vyvanse 30 MG capsule; Take 1 capsule by mouth Every Morning  Dispense: 30 capsule; Refill: 0    2. B12 deficiency  -     vitamin B-12 (CYANOCOBALAMIN) 1000 MCG tablet; Take 1 tablet by mouth Daily.  Dispense: 90 tablet; Refill: 3  -     Vitamin B12; Future    3. Vitamin D deficiency  Comments:  Severe, vitamin D level at 12.  Recommend weekly vitamin D 50,000 units  Orders:  -     vitamin D (ERGOCALCIFEROL) 1.25 MG (75840 UT) capsule capsule; Take 1 capsule by mouth Every 7 (Seven) Days.  Dispense: 12 capsule; Refill: 3  -     Vitamin D,25-Hydroxy; Future    4. Screening for hyperlipidemia  -     Lipid Panel; Future    5. Preventative health care  -     Lipid Panel; Future  -     Hemoglobin A1c; Future  -     Comprehensive Metabolic Panel; Future  -     CBC & Differential; Future  -     Urinalysis With Microscopic If Indicated (No Culture) - Urine, Clean Catch; Future  -     TSH Rfx On Abnormal To Free T4; Future  -     Vitamin D,25-Hydroxy; Future  -     Vitamin B12; Future  -     Hepatitis C Antibody; Future    6. Screening for endocrine disorder  -     Hemoglobin A1c; Future  -     Comprehensive Metabolic Panel; Future  -     TSH Rfx On Abnormal To Free T4; Future    7. Screening for deficiency anemia  -     CBC & Differential; Future    8. Screening for blood or protein in urine  -     Urinalysis With Microscopic If Indicated (No Culture) - Urine, Clean Catch; Future    9. Encounter for hepatitis C screening test for low risk patient  -     Hepatitis C Antibody; Future    10. Acute non-recurrent sinusitis, unspecified location  -     amoxicillin-clavulanate (AUGMENTIN) 875-125 MG per tablet; Take 1 tablet by mouth 2 (Two) Times a Day for 7 days.  Dispense: 14 tablet; Refill: 0      Problem List Items  Addressed This Visit          Endocrine and Metabolic    Vitamin D deficiency    Overview   Severe, vitamin D level at 12 in 2024.  Recommend weekly vitamin D 50,000 units indefinitely         Relevant Medications    vitamin D (ERGOCALCIFEROL) 1.25 MG (24480 UT) capsule capsule    Other Relevant Orders    Vitamin D,25-Hydroxy       Mental Health    Attention deficit hyperactivity disorder (ADHD), predominantly hyperactive type - Primary    Overview   Formulary change from Adderall XR 10mg to Vyvanse 30mg daily in August 2021         Current Assessment & Plan   Could consider changing the adderall IR afternoon to ritalin or increasing vyvanse since the adderall does make him a little irritable. Fine for now though  CSA updated today  F/u 3 months         Relevant Medications    amphetamine-dextroamphetamine (Adderall) 7.5 MG tablet    Vyvanse 30 MG capsule     Other Visit Diagnoses         B12 deficiency        Relevant Medications    vitamin B-12 (CYANOCOBALAMIN) 1000 MCG tablet    Other Relevant Orders    Vitamin B12      Screening for hyperlipidemia        Relevant Orders    Lipid Panel      Preventative health care        Relevant Orders    Lipid Panel    Hemoglobin A1c    Comprehensive Metabolic Panel    CBC & Differential    Urinalysis With Microscopic If Indicated (No Culture) - Urine, Clean Catch    TSH Rfx On Abnormal To Free T4    Vitamin D,25-Hydroxy    Vitamin B12    Hepatitis C Antibody      Screening for endocrine disorder        Relevant Orders    Hemoglobin A1c    Comprehensive Metabolic Panel    TSH Rfx On Abnormal To Free T4      Screening for deficiency anemia        Relevant Orders    CBC & Differential      Screening for blood or protein in urine        Relevant Orders    Urinalysis With Microscopic If Indicated (No Culture) - Urine, Clean Catch      Encounter for hepatitis C screening test for low risk patient        Relevant Orders    Hepatitis C Antibody      Acute non-recurrent sinusitis,  unspecified location                Chart reviewed for controlled substance agreement and most recent urine drug screen.  MAYURI/PDMP reviewed and appropriate. No signs of abuse or diversion, no new concerns from patient.  Refills provided as indicated or previously completed.  -Anxiety rating scale (HAM-A), PHQ-9, and adult ADHD self-report scale updated and reviewed yearly  -Patient counseled on adjuvant nonpharmacologic treatments for ADHD including exercise, low sugar diet, Rhodiola supplement OTC, CBT/behavioral health referral, meditation, and adequate sleep.    Patient Instructions   Try a neti pot the next few days  Antibiotic if needed    I would like you to have your labs done about a week prior to your next appointment.  You do not need an appointment, but I would advise to call our office a few days before you plan to have your labs done to confirm that orders are in and active.  We will discuss the results at your next scheduled visit.  -Lab services are available at any [facility] outpatient lab center, including across the street at [address] [place] [address] Dr     Your labs should be done when you're in a fasting state.  This means you should not have anything with calories for at least 10 hours prior to the blood draw. Water and black coffee are fine. Both blood sugar and triglyceride levels rise after meals, and this may impact your results. Hormone levels can also change, so unless you are specifically told you do not have to fast for labs, it is better to be fasting.    Additionally, stop any multivitamins or B vitamins (especially biotin) at least 48 hours before your labs since these can interfere with accurate measurement.     Follow Up:   Return in about 3 months (around 8/22/2025) for video visit, ADHD.    DO [address]  [facility]  [address]  [facility] [phone]  Fax [phone]  Phone [phone]

## 2025-06-02 NOTE — TELEPHONE ENCOUNTER
Caller: LeesaMayo    Relationship: Self    Best call back number: [phone]     Requested Prescriptions:   Requested Prescriptions     Pending Prescriptions Disp Refills    Vyvanse 30 MG capsule 30 capsule 0     Sig: Take 1 capsule by mouth Every Morning        Pharmacy where request should be sent: [facility] #[id number] - [address] [phone] [facility] [phone] FX     Last office visit with prescribing clinician: 5/22/2025   Last telemedicine visit with prescribing clinician: Visit date not found   Next office visit with prescribing clinician: 8/22/2025     Additional details provided by patient: 1 DAY LEFT    Does the patient have less than a 3 day supply:  [x] Yes  [] No    Would you like a call back once the refill request has been completed: [] Yes [x] No    If the office needs to give you a call back, can they leave a voicemail: [] Yes [x] No    Kali Pickett   06/02/25 08:44 EDT

## 2025-07-02 DIAGNOSIS — F90.1 ATTENTION DEFICIT HYPERACTIVITY DISORDER (ADHD), PREDOMINANTLY HYPERACTIVE TYPE: ICD-10-CM

## 2025-07-02 RX ORDER — DEXTROAMPHETAMINE SACCHARATE, AMPHETAMINE ASPARTATE, DEXTROAMPHETAMINE SULFATE AND AMPHETAMINE SULFATE 1.875; 1.875; 1.875; 1.875 MG/1; MG/1; MG/1; MG/1
TABLET ORAL
Qty: 30 TABLET | Refills: 0 | Status: SHIPPED | OUTPATIENT
Start: 2025-07-02

## 2025-07-02 RX ORDER — LISDEXAMFETAMINE DIMESYLATE 30 MG/1
30 CAPSULE ORAL EVERY MORNING
Qty: 30 CAPSULE | Refills: 0 | Status: SHIPPED | OUTPATIENT
Start: 2025-07-02

## 2025-07-02 NOTE — TELEPHONE ENCOUNTER
Caller: Kendra Landersmalcolm BRUNER    Relationship: Self    Best call back number: [phone]    Requested Prescriptions:   Requested Prescriptions     Pending Prescriptions Disp Refills    Vyvanse 30 MG capsule 30 capsule 0     Sig: Take 1 capsule by mouth Every Morning    amphetamine-dextroamphetamine (Adderall) 7.5 MG tablet 30 tablet 0     Si tab PO every afternoon        [facility] be [id number] - [address] AT [facility] & [address] [phone] [facility] [phone]      Last office visit with prescribing clinician: 2025   Last telemedicine visit with prescribing clinician: Visit date not found   Next office visit with prescribing clinician: 2025     Additional details provided by patient: HAS TWO DAYS LEFT    Does the patient have less than a 3 day supply:  [x] Yes  [] No         Radha Lawrence MA   25 10:17 EDT

## 2025-07-30 DIAGNOSIS — F90.1 ATTENTION DEFICIT HYPERACTIVITY DISORDER (ADHD), PREDOMINANTLY HYPERACTIVE TYPE: ICD-10-CM

## 2025-07-30 NOTE — TELEPHONE ENCOUNTER
Caller: LeesaMaoy fowler    Relationship: Self    Best call back number:   Telephone Information:   Mobile [phone]     Requested Prescriptions:   Requested Prescriptions     Pending Prescriptions Disp Refills    Vyvanse 30 MG capsule 30 capsule 0     Sig: Take 1 capsule by mouth Every Morning    amphetamine-dextroamphetamine (Adderall) 7.5 MG tablet 30 tablet 0     Si tab PO every afternoon        Pharmacy where request should be sent: [facility] #[id number] [place] [id number]  AT [facility] [phone] [facility] [phone] FX     Last office visit with prescribing clinician: 2025   Last telemedicine visit with prescribing clinician: Visit date not found   Next office visit with prescribing clinician: 2025     Additional details provided by patient:     Does the patient have less than a 3 day supply:  [x] Yes  [] No    Would you like a call back once the refill request has been completed: [] Yes [] No    If the office needs to give you a call back, can they leave a voicemail: [] Yes [] No    Kali Kolb Rep   25 13:33 EDT

## 2025-08-01 RX ORDER — DEXTROAMPHETAMINE SACCHARATE, AMPHETAMINE ASPARTATE, DEXTROAMPHETAMINE SULFATE AND AMPHETAMINE SULFATE 1.875; 1.875; 1.875; 1.875 MG/1; MG/1; MG/1; MG/1
TABLET ORAL
Qty: 30 TABLET | Refills: 0 | Status: SHIPPED | OUTPATIENT
Start: 2025-08-01 | End: 2025-08-04 | Stop reason: SDUPTHER

## 2025-08-01 RX ORDER — LISDEXAMFETAMINE DIMESYLATE 30 MG/1
30 CAPSULE ORAL EVERY MORNING
Qty: 30 CAPSULE | Refills: 0 | Status: SHIPPED | OUTPATIENT
Start: 2025-08-01 | End: 2025-08-04 | Stop reason: SDUPTHER

## 2025-08-04 ENCOUNTER — TELEPHONE (OUTPATIENT)
Dept: FAMILY MEDICINE CLINIC | Facility: CLINIC | Age: 35
End: 2025-08-04
Payer: MEDICAID

## 2025-08-04 DIAGNOSIS — F90.1 ATTENTION DEFICIT HYPERACTIVITY DISORDER (ADHD), PREDOMINANTLY HYPERACTIVE TYPE: ICD-10-CM

## 2025-08-04 RX ORDER — LISDEXAMFETAMINE DIMESYLATE 30 MG/1
30 CAPSULE ORAL EVERY MORNING
Qty: 30 CAPSULE | Refills: 0 | Status: SHIPPED | OUTPATIENT
Start: 2025-08-04

## 2025-08-04 RX ORDER — DEXTROAMPHETAMINE SACCHARATE, AMPHETAMINE ASPARTATE, DEXTROAMPHETAMINE SULFATE AND AMPHETAMINE SULFATE 1.875; 1.875; 1.875; 1.875 MG/1; MG/1; MG/1; MG/1
TABLET ORAL
Qty: 30 TABLET | Refills: 0 | Status: SHIPPED | OUTPATIENT
Start: 2025-08-04

## 2025-08-22 ENCOUNTER — TELEMEDICINE (OUTPATIENT)
Dept: FAMILY MEDICINE CLINIC | Facility: CLINIC | Age: 35
End: 2025-08-22
Payer: MEDICAID

## 2025-08-22 DIAGNOSIS — F90.1 ATTENTION DEFICIT HYPERACTIVITY DISORDER (ADHD), PREDOMINANTLY HYPERACTIVE TYPE: ICD-10-CM

## 2025-08-22 DIAGNOSIS — F17.200 SMOKER: Primary | ICD-10-CM

## 2025-08-22 DIAGNOSIS — E55.9 VITAMIN D DEFICIENCY: ICD-10-CM

## 2025-08-22 DIAGNOSIS — M79.672 LEFT FOOT PAIN: ICD-10-CM

## 2025-08-22 RX ORDER — VARENICLINE TARTRATE 1 MG/1
1 TABLET, FILM COATED ORAL 2 TIMES DAILY
Qty: 180 TABLET | Refills: 0 | Status: SHIPPED | OUTPATIENT
Start: 2025-08-22 | End: 2025-11-20

## 2025-08-22 RX ORDER — VARENICLINE TARTRATE 0.5 MG/1
TABLET, FILM COATED ORAL
Qty: 11 TABLET | Refills: 0 | Status: SHIPPED | OUTPATIENT
Start: 2025-08-22